# Patient Record
Sex: FEMALE | Race: WHITE | NOT HISPANIC OR LATINO | Employment: OTHER | URBAN - METROPOLITAN AREA
[De-identification: names, ages, dates, MRNs, and addresses within clinical notes are randomized per-mention and may not be internally consistent; named-entity substitution may affect disease eponyms.]

---

## 2017-10-19 ENCOUNTER — TELEPHONE (OUTPATIENT)
Dept: FAMILY MEDICINE CLINIC | Facility: CLINIC | Age: 67
End: 2017-10-19

## 2017-10-19 NOTE — TELEPHONE ENCOUNTER
Called pt to advise need to make an appointment to be seen, cannot fill controlled prescription at this time, received no answer and no machine to leave a message

## 2018-04-30 ENCOUNTER — OFFICE VISIT (OUTPATIENT)
Dept: FAMILY MEDICINE CLINIC | Facility: CLINIC | Age: 68
End: 2018-04-30

## 2018-04-30 VITALS
OXYGEN SATURATION: 99 % | DIASTOLIC BLOOD PRESSURE: 72 MMHG | SYSTOLIC BLOOD PRESSURE: 126 MMHG | HEIGHT: 63 IN | HEART RATE: 72 BPM | BODY MASS INDEX: 29.95 KG/M2 | WEIGHT: 169 LBS

## 2018-04-30 DIAGNOSIS — R35.0 URINARY FREQUENCY: ICD-10-CM

## 2018-04-30 DIAGNOSIS — K21.9 GASTROESOPHAGEAL REFLUX DISEASE, ESOPHAGITIS PRESENCE NOT SPECIFIED: ICD-10-CM

## 2018-04-30 DIAGNOSIS — Z12.11 SCREEN FOR COLON CANCER: ICD-10-CM

## 2018-04-30 DIAGNOSIS — Z00.00 MEDICARE ANNUAL WELLNESS VISIT, SUBSEQUENT: Primary | ICD-10-CM

## 2018-04-30 PROBLEM — I83.90 VARICOSE VEIN OF LEG: Status: ACTIVE | Noted: 2018-04-30

## 2018-04-30 PROBLEM — B39.9 OCULAR HISTOPLASMOSIS: Status: ACTIVE | Noted: 2018-04-30

## 2018-04-30 PROBLEM — H32 OCULAR HISTOPLASMOSIS: Status: ACTIVE | Noted: 2018-04-30

## 2018-04-30 PROCEDURE — 96160 PT-FOCUSED HLTH RISK ASSMT: CPT | Performed by: INTERNAL MEDICINE

## 2018-04-30 PROCEDURE — G0439 PPPS, SUBSEQ VISIT: HCPCS | Performed by: INTERNAL MEDICINE

## 2018-04-30 RX ORDER — SOLIFENACIN SUCCINATE 10 MG/1
10 TABLET, FILM COATED ORAL DAILY
Qty: 30 TABLET | Refills: 0 | Status: SHIPPED | OUTPATIENT
Start: 2018-04-30 | End: 2018-05-01 | Stop reason: CLARIF

## 2018-04-30 NOTE — PATIENT INSTRUCTIONS
Calcium - 1200-`1500mg /day - that is 4 servings.  May need to supplement some - 600mg/day  Vit d - 1000IU/day

## 2018-04-30 NOTE — PROGRESS NOTES
QUICK REFERENCE INFORMATION:  The ABCs of the Annual Wellness Visit    Subsequent Medicare Wellness Visit    HEALTH RISK ASSESSMENT    1950    Recent Hospitalizations:  No hospitalization(s) within the last year..        Current Medical Providers:  Patient Care Team:  Ariella Cates DO as PCP - General (Internal Medicine)        Smoking Status:  History   Smoking Status   • Never Smoker   Smokeless Tobacco   • Never Used       Alcohol Consumption:  History   Alcohol Use No       Depression Screen:   PHQ-2/PHQ-9 Depression Screening 4/30/2018   Little interest or pleasure in doing things 0   Feeling down, depressed, or hopeless 0   Total Score 0       Health Habits and Functional and Cognitive Screening:  Functional & Cognitive Status 4/30/2018   Do you have difficulty preparing food and eating? No   Do you have difficulty bathing yourself, getting dressed or grooming yourself? No   Do you have difficulty using the toilet? No   Do you have difficulty moving around from place to place? No   Do you have trouble with steps or getting out of a bed or a chair? No   In the past year have you fallen or experienced a near fall? No   Current Diet Well Balanced Diet   Dental Exam Up to date   Eye Exam Up to date   Exercise (times per week) 0 times per week   Current Exercise Activities Include None   Do you need help using the phone?  No   Are you deaf or do you have serious difficulty hearing?  No   Do you need help with transportation? No   Do you need help shopping? No   Do you need help preparing meals?  No   Do you need help with housework?  No   Do you need help with laundry? No   Do you need help taking your medications? No   Do you need help managing money? No   Do you ever drive or ride in a car without wearing a seat belt? No   Have you felt unusual stress, anger or loneliness in the last month? No   Who do you live with? Spouse   If you need help, do you have trouble finding someone available to you? No   Have  you been bothered in the last four weeks by sexual problems? No   Do you have difficulty concentrating, remembering or making decisions? No           Does the patient have evidence of cognitive impairment? No    Aspirin use counseling: Does not need ASA (and currently is not on it)      Recent Lab Results:  CMP:  Lab Results   Component Value Date    BUN 12 03/10/2015    CREATININE 0.4 (L) 03/10/2015     03/10/2015    K 4.2 03/10/2015    CO2 26 03/10/2015    CALCIUM 8.2 (L) 03/10/2015    ALBUMIN 4.1 03/08/2015    BILITOT 0.5 03/08/2015    ALKPHOS 69 03/08/2015    AST 19 03/08/2015    ALT 17 03/08/2015     Lipid Panel:     HbA1c:       Visual Acuity:  No exam data present    Age-appropriate Screening Schedule:  Refer to the list below for future screening recommendations based on patient's age, sex and/or medical conditions. Orders for these recommended tests are listed in the plan section. The patient has been provided with a written plan.    Health Maintenance   Topic Date Due   • TDAP/TD VACCINES (1 - Tdap) 05/08/1968   • PNEUMOCOCCAL VACCINES (65+ LOW/MEDIUM RISK) (1 of 2 - PCV13) 05/08/2014   • COLONOSCOPY  10/19/2017   • ZOSTER VACCINE  10/19/2017   • INFLUENZA VACCINE  08/01/2018   • MAMMOGRAM  02/22/2020          Health Maintenance Summary       Status Date      ANNUAL PHYSICAL Overdue 5/8/1953     TDAP/TD VACCINES Overdue 5/8/1968     PNEUMOCOCCAL VACCINES (65+ LOW/MEDIUM RISK) Overdue 5/8/2014     HEPATITIS C SCREENING Overdue 10/19/2017     COLONOSCOPY Overdue 10/19/2017     ZOSTER VACCINE Overdue 10/19/2017     INFLUENZA VACCINE Next Due 8/1/2018     MAMMOGRAM Next Due 2/22/2020      Done 2/22/2018 SCANNED - MAMMO          Subjective   History of Present Illness    Lanette Simpson is a 67 y.o. female who presents for an Subsequent Wellness Visit.    The following portions of the patient's history were reviewed and updated as appropriate: allergies, current medications, past family history, past  "medical history, past social history, past surgical history and problem list.    No outpatient prescriptions prior to visit.     No facility-administered medications prior to visit.        Patient Active Problem List   Diagnosis   • Ocular histoplasmosis   • GERD (gastroesophageal reflux disease)   • Varicose vein of leg   • Urinary frequency       Advance Care Planning:  has an advance directive - a copy HAS NOT been provided. Have asked the patient to send this to us to add to record.    Identification of Risk Factors:  Risk factors include: weight  and cardiovascular risk.    Review of Systems    Compared to one year ago, the patient feels her physical health is the same.  Compared to one year ago, the patient feels her mental health is the same.    Objective     Physical Exam    Vitals:    04/30/18 1501   BP: 126/72   Pulse: 72   SpO2: 99%   Weight: 76.7 kg (169 lb)   Height: 160 cm (63\")   PainSc: 0-No pain       Patient's Body mass index is 29.94 kg/m². BMI is above normal parameters. Follow-up plan includes:  exercise counseling and nutrition counseling.    Gen: well appearing in nad, no resp effort  Eyes: conjunctiva clear, perrl, eomi  ENT: mmm, no thyromegaly, no lymphadenopathy  CV: s1, s2 reg no m/r/g, no bruits, no jvd  No peripheral edema, pedal pulses intact  Breast: no skin changes, nipple inversion, axillary adenopathy, or breast mass bilaterally  Resp:  clear b/l no w/r/r  GI:  soft nt/nd  / rectal deferred - pt request  Skin: no clubbing or cyanosis  Neuro: no focal deficits.      Assessment/Plan   Patient Self-Management and Personalized Health Advice  The patient has been provided with information about: diet, exercise and prevention of cardiac or vascular disease and preventive services including:   · Colorectal cancer screening, fecal occult blood test, Diabetes screening, see lab orders, Exercise counseling provided, Fall Risk assessment done, TdaP vaccine.    Visit Diagnoses:    ICD-10-CM " ICD-9-CM   1. Medicare annual wellness visit, subsequent Z00.00 V70.0   2. Urinary frequency R35.0 788.41   3. Gastroesophageal reflux disease, esophagitis presence not specified K21.9 530.81       Trial of vesicare x 4wk to see if helpful with urinary issues, discussed urology consultation, pt declines    tdap script given  Lab orders given, pt requests quest    Orders Placed This Encounter   Procedures   • Lipid panel     Standing Status:   Future     Standing Expiration Date:   4/30/2019   • Comprehensive metabolic panel     Standing Status:   Future     Standing Expiration Date:   4/30/2019       Outpatient Encounter Prescriptions as of 4/30/2018   Medication Sig Dispense Refill   • solifenacin (VESICARE) 10 MG tablet Take 1 tablet by mouth Daily. 30 tablet 0   • Tdap (BOOSTRIX) 5-2.5-18.5 LF-MCG/0.5 injection Inject 0.5 mL into the shoulder, thigh, or buttocks 1 (One) Time for 1 dose. 0.5 mL 0     No facility-administered encounter medications on file as of 4/30/2018.        Reviewed use of high risk medication in the elderly: not applicable  Reviewed for potential of harmful drug interactions in the elderly: not applicable    Follow Up:  Return in about 1 year (around 4/30/2019) for Medicare Wellness.     An After Visit Summary and PPPS with all of these plans were given to the patient.

## 2018-05-01 ENCOUNTER — TELEPHONE (OUTPATIENT)
Dept: FAMILY MEDICINE CLINIC | Facility: CLINIC | Age: 68
End: 2018-05-01

## 2018-05-01 RX ORDER — OXYBUTYNIN CHLORIDE 10 MG/1
10 TABLET, EXTENDED RELEASE ORAL DAILY
Qty: 30 TABLET | Refills: 2 | Status: SHIPPED | OUTPATIENT
Start: 2018-05-01 | End: 2018-05-17 | Stop reason: SDUPTHER

## 2018-05-01 NOTE — TELEPHONE ENCOUNTER
Received call from pt, advised her of medicine being changed to Oxybutynin 10 mg 1 po qd, pt voiced understanding and appreciation

## 2018-05-01 NOTE — TELEPHONE ENCOUNTER
PT SAYS THE VESICARE IS A TEIR 4 ITS TO EXPENSIVE.PLEASE CHANGE. CALL TO MECHELLE PORTER Dr. PLEASE CALL PT IF WE CAN'T CHECK WHAT TEIR THE MED IS BEFORE WE ORDER CAUSE SHE CAN.

## 2018-05-05 ENCOUNTER — RESULTS ENCOUNTER (OUTPATIENT)
Dept: FAMILY MEDICINE CLINIC | Facility: CLINIC | Age: 68
End: 2018-05-05

## 2018-05-05 DIAGNOSIS — Z00.00 MEDICARE ANNUAL WELLNESS VISIT, SUBSEQUENT: ICD-10-CM

## 2018-05-15 ENCOUNTER — TELEPHONE (OUTPATIENT)
Dept: FAMILY MEDICINE CLINIC | Facility: CLINIC | Age: 68
End: 2018-05-15

## 2018-05-15 NOTE — TELEPHONE ENCOUNTER
CHECKING ON THE STATUS OF HER LABS THAT WERE TAKEN ON 5/2/18.     PLEASE [facility] [phone]    DR COLLAZO PUT HER ON A NEW MED AND PATIENT THINKS SHE IS DOING VERY WELL ON It. CAN WE CALL HER IN MORE.    OXYBUTYNIN EL 10 Mg, TAKES 1 TAB 1 X DAILY, 90 DAY SUPPLY WITH REFILLS    [facility] MAIL DELIVERY

## 2018-05-16 NOTE — TELEPHONE ENCOUNTER
Lab results are not in chart.     Spoke to Asia at [facility]. They have no record of labs being sent to them.

## 2018-05-17 RX ORDER — OXYBUTYNIN CHLORIDE 10 MG/1
10 TABLET, EXTENDED RELEASE ORAL DAILY
Qty: 90 TABLET | Refills: 1 | Status: SHIPPED | OUTPATIENT
Start: 2018-05-17 | End: 2018-11-21 | Stop reason: SDUPTHER

## 2018-05-17 NOTE — TELEPHONE ENCOUNTER
Spoke to Adam at [facility] [place].     It does not appear that Lanette had labs done at a [facility] [place] draw station.     LM at #[phone] for pt to call back.      Verify where she had labs drawn.

## 2018-05-25 ENCOUNTER — TELEPHONE (OUTPATIENT)
Dept: FAMILY MEDICINE CLINIC | Facility: CLINIC | Age: 68
End: 2018-05-25

## 2018-05-25 NOTE — TELEPHONE ENCOUNTER
[phone]  PT       HAD LABS AT [facility] [address] AND [address] [place] [address]      PLEASE LVM AND /OR MAIL COPY    HAVE BEEN WAITING FOR RESULTS

## 2018-05-31 NOTE — TELEPHONE ENCOUNTER
Patient called back and I notified of her labs results. Understanding and appreciation was verbalized.

## 2018-10-15 ENCOUNTER — OFFICE VISIT (OUTPATIENT)
Dept: FAMILY MEDICINE CLINIC | Facility: CLINIC | Age: 68
End: 2018-10-15

## 2018-10-15 VITALS
BODY MASS INDEX: 28.53 KG/M2 | SYSTOLIC BLOOD PRESSURE: 124 MMHG | DIASTOLIC BLOOD PRESSURE: 64 MMHG | WEIGHT: 161 LBS | HEIGHT: 63 IN | OXYGEN SATURATION: 98 % | HEART RATE: 91 BPM

## 2018-10-15 DIAGNOSIS — M22.2X1 PATELLOFEMORAL PAIN SYNDROME OF RIGHT KNEE: Primary | ICD-10-CM

## 2018-10-15 PROCEDURE — 99213 OFFICE O/P EST LOW 20 MIN: CPT | Performed by: INTERNAL MEDICINE

## 2018-10-15 RX ORDER — MELOXICAM 7.5 MG/1
7.5 TABLET ORAL DAILY
Qty: 30 TABLET | Refills: 0 | Status: SHIPPED | OUTPATIENT
Start: 2018-10-15 | End: 2019-01-24 | Stop reason: SDUPTHER

## 2018-10-15 NOTE — PROGRESS NOTES
"Subjective   Lanette Simpson is a 68 y.o. female who presents with knee pain involving the right knee. Onset was gradual, starting about 7 days ago. Inciting event: none known. Current symptoms include: crepitus sensation and pain located diffusely anterior knee. Pain is aggravated by going up and down stairs, kneeling and squatting. Patient has had prior knee problems - similar occurrence 6mo prior improved with icing, wore a knee sleeve for support. Evaluation to date: none. Treatment to date: none.    Admits to working at  and squatting, on knees often.  No h/o trauma to knee.    The following portions of the patient's history were reviewed and updated as appropriate: allergies, current medications, past family history, past medical history, past social history, past surgical history and problem list.     Review of Systems  Pertinent items are noted in HPI.   No night pain, fever chills, sweats, wt loss.  Has had right wrist pain last week for 2d - felt mild pain and weakness, no redness, swelling - no resolved on own    Objective   /64   Pulse 91   Ht 160 cm (63\")   Wt 73 kg (161 lb)   SpO2 98%   BMI 28.52 kg/m²   Right knee: no effusion, full active range of motion, no joint line tenderness, ligamentous structures intact.   +creptius, slight warmth c/w left   Left knee:  normal and no effusion, full active range of motion, no joint line tenderness, ligamentous structures intact.           Right wrist - no swelling, redness, warmth , or tenderness.  Full rom     Assessment/Plan Moderate patellofemoral syndrome on the right     Natural history and expected course discussed. Questions answered.  Educational materials distributed.  Rest, ice, compression, and elevation (RICE) therapy.  Quad strengthening exercises.  NSAIDs per medication orders.     F/u if not improving, discussed xray and PT if not improving           "

## 2018-10-16 ENCOUNTER — TELEPHONE (OUTPATIENT)
Dept: FAMILY MEDICINE CLINIC | Facility: CLINIC | Age: 68
End: 2018-10-16

## 2018-10-16 NOTE — TELEPHONE ENCOUNTER
PT WANTS TO KNOW IF SHE CAN TAKE IBUPROFEN WITH THE MELOXICAM THAT WAS GIVEN YESTERDAY. PLEASE CALL PT TO LET HER KNOW.

## 2018-10-16 NOTE — TELEPHONE ENCOUNTER
Called patient, advised that she should not take both medications.  Advised to take one or the other.  Patients  states he understands.

## 2018-10-19 ENCOUNTER — TELEPHONE (OUTPATIENT)
Dept: FAMILY MEDICINE CLINIC | Facility: CLINIC | Age: 68
End: 2018-10-19

## 2018-10-19 NOTE — TELEPHONE ENCOUNTER
PER PT WAS PUT ON MELOXICAM BY , WENT TO DENTIST ON TUESDAY AND HAD INFECTION DENTIST PRESCRIBED MEDROL DOSE PACK FOR INFECTION. PT WOULD LIKE TO KNOW IF THESE TWO CAN BE TAKE TOGETHER. PLEASE CALL PT BACK TO [facility], IF PT DOES NOT , PLEASE LEAVE VOICE MESSAGE.

## 2018-10-19 NOTE — TELEPHONE ENCOUNTER
Spoke with patient and advised to hold meloxicam while on medrol dose marcia. Pt verbalized understanding.

## 2018-10-19 NOTE — TELEPHONE ENCOUNTER
Would advise she hold the meloxicam until the medrol dose pack finished.  The 2 together are a big risk of GI upset/ irriation/ bleed.  Take either med with food.      Ariella

## 2018-11-21 RX ORDER — OXYBUTYNIN CHLORIDE 10 MG/1
TABLET, EXTENDED RELEASE ORAL
Qty: 90 TABLET | Refills: 1 | Status: SHIPPED | OUTPATIENT
Start: 2018-11-21 | End: 2019-08-17 | Stop reason: SDUPTHER

## 2019-01-14 ENCOUNTER — OFFICE VISIT (OUTPATIENT)
Dept: FAMILY MEDICINE CLINIC | Facility: CLINIC | Age: 69
End: 2019-01-14

## 2019-01-14 VITALS
TEMPERATURE: 98 F | OXYGEN SATURATION: 99 % | WEIGHT: 162 LBS | DIASTOLIC BLOOD PRESSURE: 88 MMHG | HEART RATE: 94 BPM | SYSTOLIC BLOOD PRESSURE: 136 MMHG | BODY MASS INDEX: 28.7 KG/M2

## 2019-01-14 DIAGNOSIS — J35.8 TONSIL STONE: ICD-10-CM

## 2019-01-14 DIAGNOSIS — J02.9 SORE THROAT: Primary | ICD-10-CM

## 2019-01-14 LAB
EXPIRATION DATE: NORMAL
INTERNAL CONTROL: NORMAL
Lab: NORMAL
S PYO AG THROAT QL: NEGATIVE

## 2019-01-14 PROCEDURE — 87880 STREP A ASSAY W/OPTIC: CPT | Performed by: NURSE PRACTITIONER

## 2019-01-14 PROCEDURE — 99213 OFFICE O/P EST LOW 20 MIN: CPT | Performed by: NURSE PRACTITIONER

## 2019-01-14 NOTE — PATIENT INSTRUCTIONS
Strep Throat  Strep throat is an infection of the throat. It is caused by germs. Strep throat spreads from person to person because of coughing, sneezing, or close contact.  Follow these instructions at home:  Medicines  · Take over-the-counter and prescription medicines only as told by your doctor.  · Take your antibiotic medicine as told by your doctor. Do not stop taking the medicine even if you feel better.  · Have family members who also have a sore throat or fever go to a doctor.  Eating and drinking  · Do not share food, drinking cups, or personal items.  · Try eating soft foods until your sore throat feels better.  · Drink enough fluid to keep your pee (urine) clear or pale yellow.  General instructions  · Rinse your mouth (gargle) with a salt-water mixture 3-4 times per day or as needed. To make a salt-water mixture, stir ½-1 tsp of salt into 1 cup of warm water.  · Make sure that all people in your house wash their hands well.  · Rest.  · Stay home from school or work until you have been taking antibiotics for 24 hours.  · Keep all follow-up visits as told by your doctor. This is important.  Contact a doctor if:  · Your neck keeps getting bigger.  · You get a rash, cough, or earache.  · You cough up thick liquid that is green, yellow-brown, or bloody.  · You have pain that does not get better with medicine.  · Your problems get worse instead of getting better.  · You have a fever.  Get help right away if:  · You throw up (vomit).  · You get a very bad headache.  · You neck hurts or it feels stiff.  · You have chest pain or you are short of breath.  · You have drooling, very bad throat pain, or changes in your voice.  · Your neck is swollen or the skin gets red and tender.  · Your mouth is dry or you are peeing less than normal.  · You keep feeling more tired or it is hard to wake up.  · Your joints are red or they hurt.  This information is not intended to replace advice given to you by your health care  provider. Make sure you discuss any questions you have with your health care provider.    Try salt water gargles a few times a day, Listerine morning and night  Document Released: 06/05/2009 Document Revised: 08/16/2017 Document Reviewed: 04/11/2016  [employer] Patient Education © 2018 [employer]

## 2019-01-14 NOTE — PROGRESS NOTES
Subjective   Lanette Simpson is a 68 y.o. female.   Chief Complaint   Patient presents with   • Sore Throat     X 1 week       History of Present Illness   Patient is here with complaint of sore throat,symptoms started one week ago.  had tooth extracted in Sept, has had pain off and on since then, is going to dentist again tomorrow. She was on an antibiotic prior to extraction in September and then after. Has had swelling under her tongue that is worse when she wakes up in the morning, has had right ear pain along with right side sore throat.   The following portions of the patient's history were reviewed and updated as appropriate: allergies, current medications, past family history, past medical history, past social history, past surgical history and problem list.    Review of Systems   Constitutional: Positive for chills and fever.   HENT: Positive for congestion (head), dental problem, ear pain (right), postnasal drip and sore throat (right side).    Respiratory: Negative for cough and shortness of breath.    Cardiovascular: Negative for chest pain.   Gastrointestinal: Positive for nausea (from drainage). Negative for diarrhea.   Musculoskeletal: Positive for arthralgias. Negative for myalgias.   Neurological: Positive for headaches. Negative for dizziness and light-headedness.       Objective   Physical Exam   Constitutional: She is oriented to person, place, and time. She appears well-developed and well-nourished. No distress.   HENT:   Head: Normocephalic and atraumatic.   Right Ear: External ear normal.   Left Ear: External ear normal.   Mouth/Throat: Mucous membranes are normal. Posterior oropharyngeal erythema (right tonsil stones) present. No oropharyngeal exudate, posterior oropharyngeal edema or tonsillar abscesses. Tonsils are 2+ on the right. Tonsils are 1+ on the left.   Eyes: Conjunctivae are normal. No scleral icterus.   Cardiovascular: Normal rate, regular rhythm and normal heart sounds.   No  murmur heard.  Pulmonary/Chest: Effort normal and breath sounds normal. No stridor. No respiratory distress.   Lymphadenopathy:     She has no cervical adenopathy.   Neurological: She is alert and oriented to person, place, and time.   Skin: Skin is warm and dry. She is not diaphoretic.   Psychiatric: She has a normal mood and affect. Her behavior is normal. Judgment and thought content normal.   Vitals reviewed.      Assessment/Plan   Lanette was seen today for sore throat.    Diagnoses and all orders for this visit:    Sore throat  -     POC Rapid Strep A    Tonsil stone        Results for orders placed or performed in visit on 01/14/19   POC Rapid Strep A   Result Value Ref Range    Rapid Strep A Screen Negative Negative, VALID, INVALID, Not Performed    Internal Control Passed Passed    Lot Number [id number]     Expiration Date 03/31/2019      Strep test is negative in office today, right tonsil is inflamed with tonsil stones, advised salt water gargles several times a day and Listerine morning and night. Offered to refer to ENT, patient declined at this time.  Follow up with dentist as scheduled.   Patient was encouraged to keep me informed of any acute changes, lack of improvement, or any new concerning symptoms.Patient voiced understanding of all instructions and denied further questions.

## 2019-01-24 ENCOUNTER — TELEPHONE (OUTPATIENT)
Dept: FAMILY MEDICINE CLINIC | Facility: CLINIC | Age: 69
End: 2019-01-24

## 2019-01-24 RX ORDER — MELOXICAM 7.5 MG/1
7.5 TABLET ORAL DAILY
Qty: 30 TABLET | Refills: 0 | Status: SHIPPED | OUTPATIENT
Start: 2019-01-24 | End: 2019-03-02 | Stop reason: SDUPTHER

## 2019-03-01 ENCOUNTER — TELEPHONE (OUTPATIENT)
Dept: FAMILY MEDICINE CLINIC | Facility: CLINIC | Age: 69
End: 2019-03-01

## 2019-03-01 NOTE — TELEPHONE ENCOUNTER
PT CALLED WANTED A PRINT OUT OF THE RESULTS FROM HER LAST LAB WORK. SHE SAID IT WAS FROM HER PHYS FROM ABOUT A YEAR AGO.  I AM SEEING LABS THAT ARE MORE CURRENT THAN THAT.    SHE WANTED TO BE ABLE TO PICK THEM UP TODAY BUT  I LET HER KNOW THAT SHE WOULD GET A CALL WHEN THEY WERE READY TO BE PICKED UP.

## 2019-03-04 RX ORDER — MELOXICAM 7.5 MG/1
TABLET ORAL
Qty: 30 TABLET | Refills: 0 | Status: SHIPPED | OUTPATIENT
Start: 2019-03-04 | End: 2019-04-03 | Stop reason: SDUPTHER

## 2019-03-25 ENCOUNTER — PROCEDURE VISIT (OUTPATIENT)
Dept: ONCOLOGY | Facility: CLINIC | Age: 69
End: 2019-03-25

## 2019-03-25 ENCOUNTER — HOSPITAL ENCOUNTER (OUTPATIENT)
Dept: ONCOLOGY | Facility: HOSPITAL | Age: 69
Discharge: HOME | End: 2019-03-25
Admitting: INTERNAL MEDICINE

## 2019-03-25 ENCOUNTER — CONSULT (OUTPATIENT)
Dept: ONCOLOGY | Facility: CLINIC | Age: 69
End: 2019-03-25

## 2019-03-25 VITALS
TEMPERATURE: 97.8 F | WEIGHT: 160 LBS | HEART RATE: 84 BPM | SYSTOLIC BLOOD PRESSURE: 170 MMHG | DIASTOLIC BLOOD PRESSURE: 74 MMHG | BODY MASS INDEX: 28.35 KG/M2 | HEIGHT: 63 IN | RESPIRATION RATE: 19 BRPM

## 2019-03-25 DIAGNOSIS — C83.30 DIFFUSE LARGE B-CELL LYMPHOMA, UNSPECIFIED BODY REGION (HCC): Primary | ICD-10-CM

## 2019-03-25 DIAGNOSIS — Z51.11 ENCOUNTER FOR ANTINEOPLASTIC CHEMOTHERAPY: ICD-10-CM

## 2019-03-25 DIAGNOSIS — C83.31 DIFFUSE LARGE B-CELL LYMPHOMA OF LYMPH NODES OF HEAD (HCC): ICD-10-CM

## 2019-03-25 DIAGNOSIS — C83.31 DIFFUSE LARGE B-CELL LYMPHOMA OF LYMPH NODES OF HEAD (HCC): Primary | ICD-10-CM

## 2019-03-25 LAB
ALBUMIN SERPL-MCNC: 4.63 G/DL (ref 3.2–4.8)
ALBUMIN/GLOB SERPL: 1.6 G/DL (ref 1.5–2.5)
ALP SERPL-CCNC: 80 U/L (ref 25–100)
ALT SERPL W P-5'-P-CCNC: 27 U/L (ref 7–40)
ANION GAP SERPL CALCULATED.3IONS-SCNC: 12 MMOL/L (ref 3–11)
AST SERPL-CCNC: 29 U/L (ref 0–33)
BASOPHILS # BLD AUTO: 0.16 10*3/MM3 (ref 0–0.2)
BASOPHILS NFR BLD AUTO: 1.7 % (ref 0–1)
BILIRUB SERPL-MCNC: 0.6 MG/DL (ref 0.3–1.2)
BUN BLD-MCNC: 21 MG/DL (ref 9–23)
BUN/CREAT SERPL: 28.4 (ref 7–25)
CALCIUM SPEC-SCNC: 9.8 MG/DL (ref 8.7–10.4)
CHLORIDE SERPL-SCNC: 104 MMOL/L (ref 99–109)
CO2 SERPL-SCNC: 24 MMOL/L (ref 20–31)
CREAT BLD-MCNC: 0.74 MG/DL (ref 0.6–1.3)
DEPRECATED RDW RBC AUTO: 51.1 FL (ref 37–54)
EOSINOPHIL # BLD AUTO: 0.25 10*3/MM3 (ref 0–0.3)
EOSINOPHIL NFR BLD AUTO: 2.7 % (ref 0–3)
ERYTHROCYTE [DISTWIDTH] IN BLOOD BY AUTOMATED COUNT: 15.1 % (ref 11.3–14.5)
GFR SERPL CREATININE-BSD FRML MDRD: 78 ML/MIN/1.73
GLOBULIN UR ELPH-MCNC: 2.9 GM/DL
GLUCOSE BLD-MCNC: 80 MG/DL (ref 70–100)
HBV SURFACE AB SER RIA-ACNC: REACTIVE
HBV SURFACE AG SERPL QL IA: NORMAL
HCT VFR BLD AUTO: 47.9 % (ref 34.5–44)
HGB BLD-MCNC: 15.8 G/DL (ref 11.5–15.5)
IMM GRANULOCYTES # BLD AUTO: 0.02 10*3/MM3 (ref 0–0.05)
IMM GRANULOCYTES NFR BLD AUTO: 0.2 % (ref 0–0.6)
LDH SERPL-CCNC: 287 U/L (ref 120–246)
LYMPHOCYTES # BLD AUTO: 2.93 10*3/MM3 (ref 0.6–4.8)
LYMPHOCYTES NFR BLD AUTO: 31.4 % (ref 24–44)
MCH RBC QN AUTO: 30.6 PG (ref 27–31)
MCHC RBC AUTO-ENTMCNC: 33 G/DL (ref 32–36)
MCV RBC AUTO: 92.8 FL (ref 80–99)
MONOCYTES # BLD AUTO: 0.59 10*3/MM3 (ref 0–1)
MONOCYTES NFR BLD AUTO: 6.3 % (ref 0–12)
NEUTROPHILS # BLD AUTO: 5.4 10*3/MM3 (ref 1.5–8.3)
NEUTROPHILS NFR BLD AUTO: 57.9 % (ref 41–71)
PLATELET # BLD AUTO: 635 10*3/MM3 (ref 150–450)
PMV BLD AUTO: 10.3 FL (ref 6–12)
POTASSIUM BLD-SCNC: 4.9 MMOL/L (ref 3.5–5.5)
PROT SERPL-MCNC: 7.5 G/DL (ref 5.7–8.2)
RBC # BLD AUTO: 5.16 10*6/MM3 (ref 3.89–5.14)
SODIUM BLD-SCNC: 140 MMOL/L (ref 132–146)
WBC NRBC COR # BLD: 9.33 10*3/MM3 (ref 3.5–10.8)

## 2019-03-25 PROCEDURE — 38221 DX BONE MARROW BIOPSIES: CPT

## 2019-03-25 PROCEDURE — 85097 BONE MARROW INTERPRETATION: CPT | Performed by: INTERNAL MEDICINE

## 2019-03-25 PROCEDURE — 25010000002 DIPHENHYDRAMINE PER 50 MG: Performed by: INTERNAL MEDICINE

## 2019-03-25 PROCEDURE — 99205 OFFICE O/P NEW HI 60 MIN: CPT | Performed by: INTERNAL MEDICINE

## 2019-03-25 PROCEDURE — 80053 COMPREHEN METABOLIC PANEL: CPT | Performed by: INTERNAL MEDICINE

## 2019-03-25 PROCEDURE — 88311 DECALCIFY TISSUE: CPT | Performed by: INTERNAL MEDICINE

## 2019-03-25 PROCEDURE — 87340 HEPATITIS B SURFACE AG IA: CPT | Performed by: INTERNAL MEDICINE

## 2019-03-25 PROCEDURE — 85025 COMPLETE CBC W/AUTO DIFF WBC: CPT | Performed by: INTERNAL MEDICINE

## 2019-03-25 PROCEDURE — 88341 IMHCHEM/IMCYTCHM EA ADD ANTB: CPT | Performed by: INTERNAL MEDICINE

## 2019-03-25 PROCEDURE — 86704 HEP B CORE ANTIBODY TOTAL: CPT | Performed by: INTERNAL MEDICINE

## 2019-03-25 PROCEDURE — 88313 SPECIAL STAINS GROUP 2: CPT | Performed by: INTERNAL MEDICINE

## 2019-03-25 PROCEDURE — 86706 HEP B SURFACE ANTIBODY: CPT | Performed by: INTERNAL MEDICINE

## 2019-03-25 PROCEDURE — 96375 TX/PRO/DX INJ NEW DRUG ADDON: CPT

## 2019-03-25 PROCEDURE — 83615 LACTATE (LD) (LDH) ENZYME: CPT | Performed by: INTERNAL MEDICINE

## 2019-03-25 PROCEDURE — 38222 DX BONE MARROW BX & ASPIR: CPT | Performed by: INTERNAL MEDICINE

## 2019-03-25 PROCEDURE — 88342 IMHCHEM/IMCYTCHM 1ST ANTB: CPT | Performed by: INTERNAL MEDICINE

## 2019-03-25 PROCEDURE — 88305 TISSUE EXAM BY PATHOLOGIST: CPT | Performed by: INTERNAL MEDICINE

## 2019-03-25 PROCEDURE — 25010000002 LORAZEPAM PER 2 MG: Performed by: INTERNAL MEDICINE

## 2019-03-25 RX ORDER — LORAZEPAM 2 MG/ML
1 INJECTION INTRAMUSCULAR
Status: CANCELLED | OUTPATIENT
Start: 2019-03-25

## 2019-03-25 RX ORDER — DIPHENHYDRAMINE HYDROCHLORIDE 50 MG/ML
25 INJECTION INTRAMUSCULAR; INTRAVENOUS EVERY 4 HOURS PRN
Status: DISCONTINUED | OUTPATIENT
Start: 2019-03-25 | End: 2019-03-26 | Stop reason: HOSPADM

## 2019-03-25 RX ORDER — LORAZEPAM 2 MG/ML
1 INJECTION INTRAMUSCULAR
Status: DISCONTINUED | OUTPATIENT
Start: 2019-03-25 | End: 2019-03-26 | Stop reason: HOSPADM

## 2019-03-25 RX ORDER — LIDOCAINE AND PRILOCAINE 25; 25 MG/G; MG/G
CREAM TOPICAL AS NEEDED
Qty: 30 G | Refills: 3 | Status: SHIPPED | OUTPATIENT
Start: 2019-03-25 | End: 2019-11-08

## 2019-03-25 RX ORDER — DIPHENHYDRAMINE HYDROCHLORIDE 50 MG/ML
25 INJECTION INTRAMUSCULAR; INTRAVENOUS EVERY 4 HOURS PRN
Status: CANCELLED | OUTPATIENT
Start: 2019-03-25

## 2019-03-25 RX ADMIN — DIPHENHYDRAMINE HYDROCHLORIDE 25 MG: 50 INJECTION INTRAMUSCULAR; INTRAVENOUS at 12:57

## 2019-03-25 RX ADMIN — LORAZEPAM 1 MG: 2 INJECTION INTRAMUSCULAR; INTRAVENOUS at 12:58

## 2019-03-25 NOTE — PROGRESS NOTES
============    BONE MARROW PROCEDURE      =========        INDICATIONS: Staging lymphoma    PROCEDURE:  A time out was performed Premedications was given with 1 mg of ativan IV and Phenergan 25 mg IV.  After informed consent was obtained, the skin overlying the right  posterior superior iliac spine was prepped and draped in sterile fashion.     1% lidocaine was infiltrated into the skin, subcutaneous tissue and periosteum overlying the right posterior iliac spine.   A  Jamshidi needle was introduced into the marrow cavity and suction was applied with a 20 cc syringe.  Aspirate material was obtained.  The needle was then reinserted into the marrow space and a core biopsy was obtained. The patient tolerated the procedure well.  No immediate complications.    The patient was observed in the procedure room for 30 minutes following the procedure and then discharged to home.        Joan Zuluaga MD   3/25/2019

## 2019-03-25 NOTE — PROGRESS NOTES
DATE OF CONSULTATION: 3/25/2019    REFERRING PHYSICIAN: Ariella Cates DO    Dear Ariella Eastman DO  Thank you for asking for my medical advice on this patient. I saw her in the  [place] office on 3/25/2019    REASON FOR CONSULTATION: Diffuse large B-cell lymphoma    HISTORY OF PRESENT ILLNESS: The patient is a very pleasant 68 y.o.  female   who was in her usual state of health until August 2018.  Patient had right lower tooth extraction.  Since then she has been having some complaints in her mouth.  She has been having pain, numbness in her lower lip.  Difficulty swallowing.  Never had this problem before.  She went and saw the dentist who did an x-ray that revealed some abnormalities.  She was referred to oral surgeon at .  A CT jaw revealed multiple contrast-enhancing lesions lower gum.  Biopsy done on March 7, 2019 revealed diffuse large B-cell lymphoma.  The patient was referred to me for further recommendations.    SUBJECTIVE: When I saw the patient today she is here with her .  She is anxious about her new cancer diagnosis.  She is complaining of weight loss, she lost 15 pounds over the last year.  Denies any night sweats.  Never been diagnosed with cancer before she is healthy active continue to work part-time.    Review of Systems   Constitutional: Negative for activity change, appetite change, chills, fatigue, fever and unexpected weight change.   HENT: Negative for hearing loss, mouth sores, nosebleeds, sore throat and trouble swallowing.    Eyes: Negative for visual disturbance.   Respiratory: Negative for cough, chest tightness, shortness of breath and wheezing.    Cardiovascular: Negative for chest pain, palpitations and leg swelling.   Gastrointestinal: Negative for abdominal distention, abdominal pain, blood in stool, constipation, diarrhea, nausea, rectal pain and vomiting.   Endocrine: Negative for cold intolerance and heat intolerance.   Genitourinary: Negative for difficulty  urinating, dysuria, frequency and urgency.   Musculoskeletal: Negative for arthralgias, back pain, gait problem, joint swelling and myalgias.   Skin: Negative for rash.   Neurological: Negative for dizziness, tremors, syncope, weakness, light-headedness, numbness and headaches.   Hematological: Negative for adenopathy. Does not bruise/bleed easily.   Psychiatric/Behavioral: Negative for confusion, sleep disturbance and suicidal ideas. The patient is not nervous/anxious.        History reviewed. No pertinent past medical history.    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    3 kids.    10 gk.    Working PT    Exercise: active, no formal    Dental: due, does not like the dentist    Eye: utd, glasses       Family History   Problem Relation Age of Onset   • Pancreatic cancer Mother    • Hypertension Mother    • Diabetes Father    • Prostate cancer Father    • Hypertension Sister    • Diabetes Brother    • Hypertension Brother    • Breast cancer Maternal Grandmother    • Stroke Maternal Grandfather    • Breast cancer Paternal Grandmother    • Glaucoma Paternal Grandfather    • Diabetes Brother    • Hypertension Brother        Past Surgical History:   Procedure Laterality Date   • TOTAL ABDOMINAL HYSTERECTOMY      bladder lift       No Known Allergies       Current Outpatient Medications:   •  CALCIUM PO, Take  by mouth., Disp: , Rfl:   •  meloxicam (MOBIC) 7.5 MG tablet, TAKE ONE TABLET BY MOUTH DAILY, Disp: 30 tablet, Rfl: 0  •  Multiple Vitamins-Minerals  "(MULTIVITAMIN ADULT PO), Take  by mouth., Disp: , Rfl:   •  oxybutynin XL (DITROPAN-XL) 10 MG 24 hr tablet, TAKE 1 TABLET EVERY DAY, Disp: 90 tablet, Rfl: 1    PHYSICAL EXAMINATION:   /74   Pulse 84   Temp 97.8 °F (36.6 °C) (Temporal)   Resp 19   Ht 160 cm (63\")   Wt 72.6 kg (160 lb)   BMI 28.34 kg/m²    ECOG Performance Status: 1 - Symptomatic but completely ambulatory  General Appearance:  alert, cooperative, no apparent distress and appears stated age   Neurologic/Psychiatric: A&O x 3, gait steady, appropriate affect, strength 5/5 in all muscle groups   HEENT:  Normocephalic, without obvious abnormality, mucous membranes moist   Neck: Supple, symmetrical, trachea midline, no adenopathy;  No thyromegaly, masses, or tenderness   Lungs:   Clear to auscultation bilaterally; respirations regular, even, and unlabored bilaterally   Heart:  Regular rate and rhythm, no murmurs appreciated   Abdomen:   Soft, non-tender, non-distended and no organomegaly   Lymph nodes: No cervical, supraclavicular, inguinal or axillary adenopathy noted   Extremities: Normal, atraumatic; no clubbing, cyanosis, or edema    Skin: No rashes, ulcers, or suspicious lesions noted       No visits with results within 2 Week(s) from this visit.   Latest known visit with results is:   Office Visit on 01/14/2019   Component Date Value Ref Range Status   • Rapid Strep A Screen 01/14/2019 Negative  Negative, VALID, INVALID, Not Performed Final   • Internal Control 01/14/2019 Passed  Passed Final   • Lot Number 01/14/2019 [id number]   Final   • Expiration Date 01/14/2019 03/31/2019   Final        No results found.      DIAGNOSTIC DATA:   1. Radiology: CAT scan facial bones done on February 26, 2019 revealed 2.3 cm lucency in the anterior mandible bone.  2. Dr. colindres's note reviewed by me and documented in the  chart.   3. Pathology report: March 7, 2019 mandible bone biopsy revealed diffuse large B-cell lymphoma  4. Laboratory data: Reviewed by " me document patient's chart    ASSESSMENT: The patient is a very pleasant 68 y.o.  female  with mandible diffuse large B-cell lymphoma    PROBLEM LIST:   1.  Mandible diffuse large B-cell lymphoma:  A.  Presented with gum pain and swelling  B.  CT facial bones done on February 26, 2019 revealed 2.3 cm lucency in the anterior mandible  C.  Status post biopsy done on March 7, 2019 consistent with diffuse large B-cell lymphoma  2.  Osteoarthritis    PLAN:   1. I had a long discussion today with the patient and her  about her  new diagnosis of lymphoma. I did go over the final pathology report in  detail with both of them. I reviewed the patient's documents including refereing provider's notes, lab results, imaging, and path report.   2.  I will do staging workup with whole body PET scan as well as bone marrow biopsy.  3.  I would arrange for Port-A-Cath placement as well as cardiac echo for chemotherapy preparation.  4.  I explained the patient that should be treated with systemic chemotherapy.  Number of cycles will depend on her stage.  Also she might benefit from involved field radiation.  5.  The patient will be treated with R CHOP chemotherapy.  I will get her set up to see my nurse practitioner for chemotherapy education session.  6.  We discussed the potential risks and side effects of R CHOP chemotherapy including neutropenia, alopecia, nausea and vomiting, fatigue, neuropathy, cardiomyopathy, constipation, infusion reaction, and a small risk of myelodysplasia.  7.  The patient will follow up with me in 1 week to go over the results as well as need to start on treatment.  8.  I will monitor the patient blood work including blood counts and facial function and electrolytes.  9.  I will check the patient's serum LDH level today for prognosis purposes.  Joan Zuluaga MD  3/25/2019

## 2019-03-26 LAB — HBV CORE AB SER DONR QL IA: NEGATIVE

## 2019-03-27 ENCOUNTER — APPOINTMENT (OUTPATIENT)
Dept: PREADMISSION TESTING | Facility: HOSPITAL | Age: 69
End: 2019-03-27

## 2019-03-27 ENCOUNTER — OFFICE VISIT (OUTPATIENT)
Dept: ONCOLOGY | Facility: CLINIC | Age: 69
End: 2019-03-27

## 2019-03-27 VITALS
DIASTOLIC BLOOD PRESSURE: 69 MMHG | TEMPERATURE: 97.9 F | RESPIRATION RATE: 12 BRPM | SYSTOLIC BLOOD PRESSURE: 130 MMHG | BODY MASS INDEX: 28.7 KG/M2 | HEIGHT: 63 IN | HEART RATE: 79 BPM | WEIGHT: 162 LBS | OXYGEN SATURATION: 98 %

## 2019-03-27 VITALS — HEIGHT: 63 IN | WEIGHT: 162.04 LBS | BODY MASS INDEX: 28.71 KG/M2

## 2019-03-27 DIAGNOSIS — C83.31 DIFFUSE LARGE B-CELL LYMPHOMA OF LYMPH NODES OF HEAD (HCC): Primary | ICD-10-CM

## 2019-03-27 LAB
INR PPP: 1.09 (ref 0.85–1.16)
PROTHROMBIN TIME: 13.6 SECONDS (ref 11.2–14.3)

## 2019-03-27 PROCEDURE — 36415 COLL VENOUS BLD VENIPUNCTURE: CPT

## 2019-03-27 PROCEDURE — 93005 ELECTROCARDIOGRAM TRACING: CPT

## 2019-03-27 PROCEDURE — 99215 OFFICE O/P EST HI 40 MIN: CPT | Performed by: NURSE PRACTITIONER

## 2019-03-27 PROCEDURE — 93010 ELECTROCARDIOGRAM REPORT: CPT | Performed by: INTERNAL MEDICINE

## 2019-03-27 PROCEDURE — 85610 PROTHROMBIN TIME: CPT | Performed by: SURGERY

## 2019-03-27 RX ORDER — PREDNISONE 50 MG/1
100 TABLET ORAL DAILY
Qty: 10 TABLET | Refills: 5 | Status: SHIPPED | OUTPATIENT
Start: 2019-03-27 | End: 2019-04-01

## 2019-03-27 RX ORDER — ONDANSETRON HYDROCHLORIDE 8 MG/1
8 TABLET, FILM COATED ORAL 3 TIMES DAILY PRN
Qty: 30 TABLET | Refills: 5 | Status: SHIPPED | OUTPATIENT
Start: 2019-03-27 | End: 2019-11-22

## 2019-03-27 NOTE — PROGRESS NOTES
CHEMOTHERAPY PREPARATION    Lanette Simpson  [id number]  1950    Chief Complaint: chemo education     History of present illness:  Lanette Simpson is a 68 y.o. year old female who is here today for chemotherapy preparation and needs assessment. The patient has been diagnosed with diffuse large B-cell lymphoma and is scheduled to begin treatment with R-CHOP.     Oncology History:     No history exists.       Past Medical History:   Diagnosis Date   • Arthritis    • Lymphoma (CMS/HCC)    • OAB (overactive bladder)    • Wears glasses        Past Surgical History:   Procedure Laterality Date   • BONE BIOPSY      jaw   • BONE MARROW BIOPSY     • COLONOSCOPY  2013   • TOTAL ABDOMINAL HYSTERECTOMY      bladder lift       MEDICATIONS: The current medication list was reviewed and reconciled.     Allergies:  has No Known Allergies.    Family History   Problem Relation Age of Onset   • Pancreatic cancer Mother    • Hypertension Mother    • Diabetes Father    • Prostate cancer Father    • Hypertension Sister    • Diabetes Brother    • Hypertension Brother    • Breast cancer Maternal Grandmother    • Stroke Maternal Grandfather    • Breast cancer Paternal Grandmother    • Glaucoma Paternal Grandfather    • Diabetes Brother    • Hypertension Brother          Review of Systems   Constitutional: Positive for fatigue and unexpected weight change. Negative for fever.   HENT: Negative for congestion, hearing loss, sore throat and trouble swallowing.    Eyes: Negative for visual disturbance.   Respiratory: Negative for cough, shortness of breath and wheezing.    Cardiovascular: Negative for chest pain and leg swelling.   Gastrointestinal: Negative for abdominal distention, abdominal pain, constipation, diarrhea, nausea and vomiting.   Endocrine: Negative.    Genitourinary: Negative.    Musculoskeletal: Negative for arthralgias, back pain and gait problem.   Skin: Negative.    Allergic/Immunologic: Negative.   "  Neurological: Negative for dizziness, weakness, numbness and headaches.   Hematological: Negative for adenopathy. Does not bruise/bleed easily.   Psychiatric/Behavioral: Positive for sleep disturbance.   All other systems reviewed and are negative.      Physical Exam  Vital Signs: /69   Pulse 79   Temp 97.9 °F (36.6 °C) (Temporal)   Resp 12   Ht 160 cm (63\")   Wt 73.5 kg (162 lb)   SpO2 98%   BMI 28.70 kg/m²    General Appearance:  alert, cooperative, no apparent distress and appears stated age   Neurologic/Psychiatric: A&O x 3, gait steady, appropriate affect   HEENT:  Normocephalic, without obvious abnormality, mucous membranes moist   Lungs:   Clear to auscultation bilaterally; respirations regular, even, and unlabored bilaterally   Heart:  Regular rate and rhythm, no murmurs appreciated   Extremities: Normal, atraumatic; no clubbing, cyanosis, or edema    Skin: No rashes, lesions, or abnormal coloration noted     ECOG Performance Status: (0) Fully active, able to carry on all predisease performance without restriction          NEEDS ASSESSMENTS    Genetics  The patient's new diagnosis and family history have been reviewed for genetic counseling needs. A genetic referral is not recommended.     Psychosocial  The patient has completed a PHQ-9 Depression Screening and the Distress Thermometer (DT) today.   PHQ-9 results show 1-4 (Minimal Depression). The patient scored their distress today as 5 on a scale of 0-10 with 0 being no distress and 10 being extreme distress.   Problems marked by the patient as being an issue for them within the last week include practical problems, family problems and emotional problems.   Results were reviewed along with psychosocial resources offered by our cancer center. Our oncology social worker will be flagged for a DT score of 4 or above, and a same day call will be made for a score of 9 or 10. A mental health referral is not recommended at this time. The patient is " "not accepting of a referral to [facility].   Copies of patient's questionnaires will be scanned into EMR for details and further reference.    Barriers to care  A barriers form was also completed by the patient today. We discussed services offered by our facility to help her have adequate access to care. The patient was given the name and card for our Oncology Social Worker, Gali Schmidt. Based upon barriers assessment today, the patient will not require a follow-up call from the  to further discuss needs.   A copy of the barriers form will also be scanned into EMR for details and further reference.     VAD Assessment  The patient and I discussed planned intervenous chemotherapy as well as other IV treatments that are often needed throughout the course of treatment. These may include, but are not limited to blood transfusions, antibiotics, and IV hydration. The vasculature does not appear to be adequate for multiple peripheral IVs throughout their treatment course. Discussed risks and benefits of VADs. The patient would like to pursue Port-A-Cath insertion prior to initiation of treatment. The patient has been contacted regarding port placement with Dr. Ling. She has been scheduled for 4/4/19, however we will try to move this up to be done prior to her starting chemotherapy.     Advanced Care Planning  The patient and I discussed advanced care planning, \"Conversations that Matter\".   This service was offered, free of charge, for development of advance directives with a certified ACP facilitator.  The patient does not have an up-to-date advanced directive. This document is not on file with our office. The patient is not interested in an appointment with one of our facilitators to create or update their advanced directives.      Palliative Care  The patient and I discussed palliative care services. Palliative care is not the same as Hospice care. This is specialized medical care for people " living with serious illness with the goal of improving quality of life for the patient and their family. [facility] has partnered with [facility] to offer our patients outpatient palliative care early along with their treatment to assist in coordination of care, symptom management, pain management, and medical decision making.  Oncology criteria for palliative care referral is not met at this time. The patient is not interested in a palliative care consultation.     Additional Referral needs  none      CHEMOTHERAPY EDUCATION    Booklets Given: Chemotherapy and You [x]  Eating Hints [x]    Sexuality/Fertility Books []      Chemotherapy/Biotherapy Education Sheets: (list all that apply)  nausea management, acid reflux management, diarrhea management, Cancer resourse contacts information, skin and mouth care and wig information                                                                                                                                                                 Chemotherapy Regimen:   Treatment Plans     Name Type Plan dates Plan Provider         Active    OP LYMPHOMA R-CHOP RiTUXimab / Cyclophosphamide / DOXOrubicin / VinCRIStine / PredniSONE / Pegfilgrastim ONCOLOGY TREATMENT  3/31/2019 - Present Joan Zuluaga MD                    TOPICS EDUCATION PROVIDED COMMENTS   ANEMIA:  role of RBC, cause, s/s, ways to manage, role of transfusion [x]    THROMBOCYTOPENIA:  role of platelet, cause, s/s, ways to prevent bleeding, things to avoid, when to seek help [x]    NEUTROPENIA:  role of WBC, cause, infection precautions, s/s of infection, when to call MD [x]    NUTRITION & APPETITE CHANGES:  importance of maintaining healthy diet & weight, ways to manage to improve intake, dietary consult, exercise regimen [x]    DIARRHEA:  causes, s/s of dehydration, ways to manage, dietary changes, when to call MD [x]    CONSTIPATION:  causes, ways to manage, dietary changes, when to call MD [x]     NAUSEA & VOMITING:  cause, use of antiemetics, dietary changes, when to call MD [x]    MOUTH SORES:  causes, oral care, ways to manage [x]    ALOPECIA:  cause, ways to manage, resources [x]    INFERTILITY & SEXUALITY:  causes, fertility preservation options, sexuality changes, ways to manage, importance of birth control []    NERVOUS SYSTEM CHANGES:  causes, s/s, neuropathies, cognitive changes, ways to manage [x]    PAIN:  causes, ways to manage [x] ????   SKIN & NAIL CHANGES:  cause, s/s, ways to manage [x]    ORGAN TOXICITIES:  cause, s/s, need for diagnostic tests, labs, when to notify MD [x]    SURVIVORSHIP:  distress, distress assessment, secondary malignancies, early/late effects, follow-up, social issues, social support [x]    HOME CARE:  use of spill kits, storing of PO chemo, how to manage bodily fluids [x]    MISCELLANEOUS:  drug interactions, administration, vesicant, et [x]        Assessment and Plan:    Diagnoses and all orders for this visit:    Diffuse large B-cell lymphoma of lymph nodes of head (CMS/HCC)  -     predniSONE (DELTASONE) 50 MG tablet; Take 2 tablets by mouth Daily for 5 doses. Take with food.  Bring the first dose to chemotherapy appointment.  -     ondansetron (ZOFRAN) 8 MG tablet; Take 1 tablet by mouth 3 (Three) Times a Day As Needed for Nausea or Vomiting.        This was a 60 minute face-to-face visit with 60 minutes spent in  counseling and coordination of care as documented above.   The patient and I have reviewed their new cancer diagnosis and scheduled treatment plan. Needs assessment was completed including genetics, psychosocial needs, barriers to care, VAD evaluation, advanced care planning, and palliative care services. Referrals have been ordered as appropriate based upon our evaluation and patient desires.     Chemotherapy teaching was also completed today as documented above. Adequate time was given to answer all questions to her satisfaction. Patient and family are  aware of their care team members and contact information if they have questions or problems throughout the treatment course. Needs assessments and education has been completed. The patient is adequately prepared to begin treatment as scheduled.     RX sent in for Prednisone and Zofran. Advised to take Zofran every 8 hours as needed for nausea. I told her to bring prednisone with her to infusion to start dose here, and then take 2 tablets daily for a total of 5 days with each treatment cycle.     The patient was given information on Neulasta. I advised she may experience bone pain as a side effects. I recommended use of Tylenol/Advil/Aleve as well as daily Claritin 10 mg to help ease her symptoms.     I advised she can use over the counter treatment for management of constipation/diarrhea/cold symptoms, and mild aches and pains.     I reviewed the option for [facility] visits if needed for evaluation and management of side effects related to treatment.     The patient has received RX for EMLA cream to be applied prior to port access. We will have her scheduled for port placement.     The patient was given a list of local wig retailers for options for purchasing a wig to use during treatment.     Migdalia Blanca, [facility]

## 2019-03-28 ENCOUNTER — HOSPITAL ENCOUNTER (OUTPATIENT)
Dept: CARDIOLOGY | Facility: HOSPITAL | Age: 69
Discharge: HOME | End: 2019-03-28

## 2019-03-28 ENCOUNTER — HOSPITAL ENCOUNTER (OUTPATIENT)
Dept: PET IMAGING | Facility: HOSPITAL | Age: 69
Discharge: HOME | End: 2019-03-28

## 2019-03-28 ENCOUNTER — HOSPITAL ENCOUNTER (OUTPATIENT)
Dept: PET IMAGING | Facility: HOSPITAL | Age: 69
Discharge: HOME | End: 2019-03-28
Admitting: INTERNAL MEDICINE

## 2019-03-28 VITALS — BODY MASS INDEX: 28.7 KG/M2 | WEIGHT: 162 LBS | HEIGHT: 63 IN

## 2019-03-28 DIAGNOSIS — C83.31 DIFFUSE LARGE B-CELL LYMPHOMA OF LYMPH NODES OF HEAD (HCC): ICD-10-CM

## 2019-03-28 DIAGNOSIS — Z51.11 ENCOUNTER FOR ANTINEOPLASTIC CHEMOTHERAPY: ICD-10-CM

## 2019-03-28 LAB
BH CV ECHO MEAS - AO ROOT AREA (BSA CORRECTED): 1.8
BH CV ECHO MEAS - AO ROOT AREA: 8.3 CM^2
BH CV ECHO MEAS - AO ROOT DIAM: 3.2 CM
BH CV ECHO MEAS - BSA(HAYCOCK): 1.8 M^2
BH CV ECHO MEAS - BSA: 1.8 M^2
BH CV ECHO MEAS - BZI_BMI: 28.7 KILOGRAMS/M^2
BH CV ECHO MEAS - BZI_METRIC_HEIGHT: 160 CM
BH CV ECHO MEAS - BZI_METRIC_WEIGHT: 73.5 KG
BH CV ECHO MEAS - EDV(CUBED): 74.4 ML
BH CV ECHO MEAS - EDV(MOD-SP2): 79 ML
BH CV ECHO MEAS - EDV(MOD-SP4): 108 ML
BH CV ECHO MEAS - EDV(TEICH): 78.8 ML
BH CV ECHO MEAS - EF(CUBED): 68.3 %
BH CV ECHO MEAS - EF(MOD-BP): 65 %
BH CV ECHO MEAS - EF(MOD-SP2): 67.1 %
BH CV ECHO MEAS - EF(MOD-SP4): 63 %
BH CV ECHO MEAS - EF(TEICH): 60.3 %
BH CV ECHO MEAS - ESV(CUBED): 23.6 ML
BH CV ECHO MEAS - ESV(MOD-SP2): 26 ML
BH CV ECHO MEAS - ESV(MOD-SP4): 40 ML
BH CV ECHO MEAS - ESV(TEICH): 31.3 ML
BH CV ECHO MEAS - FS: 31.8 %
BH CV ECHO MEAS - IVS/LVPW: 0.94
BH CV ECHO MEAS - IVSD: 1.2 CM
BH CV ECHO MEAS - LA DIMENSION: 3.6 CM
BH CV ECHO MEAS - LA/AO: 1.1
BH CV ECHO MEAS - LAD MAJOR: 5.5 CM
BH CV ECHO MEAS - LAT PEAK E' VEL: 8.3 CM/SEC
BH CV ECHO MEAS - LATERAL E/E' RATIO: 6.8
BH CV ECHO MEAS - LV DIASTOLIC VOL/BSA (35-75): 61.1 ML/M^2
BH CV ECHO MEAS - LV MASS(C)D: 186.1 GRAMS
BH CV ECHO MEAS - LV MASS(C)DI: 105.3 GRAMS/M^2
BH CV ECHO MEAS - LV MAX PG: 3.7 MMHG
BH CV ECHO MEAS - LV MEAN PG: 1.5 MMHG
BH CV ECHO MEAS - LV SYSTOLIC VOL/BSA (12-30): 22.6 ML/M^2
BH CV ECHO MEAS - LV V1 MAX: 95.8 CM/SEC
BH CV ECHO MEAS - LV V1 MEAN: 54.6 CM/SEC
BH CV ECHO MEAS - LV V1 VTI: 22.5 CM
BH CV ECHO MEAS - LVIDD: 4.2 CM
BH CV ECHO MEAS - LVIDS: 2.9 CM
BH CV ECHO MEAS - LVLD AP2: 8.2 CM
BH CV ECHO MEAS - LVLD AP4: 8.3 CM
BH CV ECHO MEAS - LVLS AP2: 6 CM
BH CV ECHO MEAS - LVLS AP4: 6.5 CM
BH CV ECHO MEAS - LVOT AREA (M): 3.1 CM^2
BH CV ECHO MEAS - LVOT AREA: 3 CM^2
BH CV ECHO MEAS - LVOT DIAM: 2 CM
BH CV ECHO MEAS - LVPWD: 1.3 CM
BH CV ECHO MEAS - MED PEAK E' VEL: 8.1 CM/SEC
BH CV ECHO MEAS - MEDIAL E/E' RATIO: 7
BH CV ECHO MEAS - MV A MAX VEL: 93.3 CM/SEC
BH CV ECHO MEAS - MV DEC TIME: 0.19 SEC
BH CV ECHO MEAS - MV E MAX VEL: 58.2 CM/SEC
BH CV ECHO MEAS - MV E/A: 0.62
BH CV ECHO MEAS - PA ACC SLOPE: 1225 CM/SEC^2
BH CV ECHO MEAS - PA ACC TIME: 0.07 SEC
BH CV ECHO MEAS - PA PR(ACCEL): 46.9 MMHG
BH CV ECHO MEAS - RAP SYSTOLE: 8 MMHG
BH CV ECHO MEAS - RVDD: 2.4 CM
BH CV ECHO MEAS - RVSP: 25 MMHG
BH CV ECHO MEAS - SI(CUBED): 28.8 ML/M^2
BH CV ECHO MEAS - SI(LVOT): 38.1 ML/M^2
BH CV ECHO MEAS - SI(MOD-SP2): 30 ML/M^2
BH CV ECHO MEAS - SI(MOD-SP4): 38.5 ML/M^2
BH CV ECHO MEAS - SI(TEICH): 26.9 ML/M^2
BH CV ECHO MEAS - SV(CUBED): 50.8 ML
BH CV ECHO MEAS - SV(LVOT): 67.3 ML
BH CV ECHO MEAS - SV(MOD-SP2): 53 ML
BH CV ECHO MEAS - SV(MOD-SP4): 68 ML
BH CV ECHO MEAS - SV(TEICH): 47.5 ML
BH CV ECHO MEAS - TAPSE (>1.6): 2.5 CM2
BH CV ECHO MEAS - TR MAX PG: 17 MMHG
BH CV ECHO MEAS - TR MAX VEL: 202.2 CM/SEC
BH CV ECHO MEASUREMENTS AVERAGE E/E' RATIO: 7.1
BH CV VAS BP LEFT ARM: NORMAL MMHG
BH CV XLRA - RV BASE: 3.8 CM
BH CV XLRA - RV LENGTH: 5.2 CM
BH CV XLRA - RV MID: 3 CM
BH CV XLRA - TDI S': 17.7 CM/SEC
GLUCOSE BLDC GLUCOMTR-MCNC: 80 MG/DL (ref 70–130)
LEFT ATRIUM VOLUME INDEX: 28.3 ML/M^2
LEFT ATRIUM VOLUME: 50 ML
MAXIMAL PREDICTED HEART RATE: 151 BPM
STRESS TARGET HR: 128 BPM

## 2019-03-28 PROCEDURE — 78815 PET IMAGE W/CT SKULL-THIGH: CPT

## 2019-03-28 PROCEDURE — A9552 F18 FDG: HCPCS | Performed by: INTERNAL MEDICINE

## 2019-03-28 PROCEDURE — 93306 TTE W/DOPPLER COMPLETE: CPT

## 2019-03-28 PROCEDURE — 0 FLUDEOXYGLUCOSE F18 SOLUTION: Performed by: INTERNAL MEDICINE

## 2019-03-28 PROCEDURE — 25010000002 SULFUR HEXAFLUORIDE MICROSPH 60.7-25 MG RECONSTITUTED SUSPENSION: Performed by: INTERNAL MEDICINE

## 2019-03-28 PROCEDURE — 93306 TTE W/DOPPLER COMPLETE: CPT | Performed by: INTERNAL MEDICINE

## 2019-03-28 PROCEDURE — 82962 GLUCOSE BLOOD TEST: CPT

## 2019-03-28 RX ADMIN — SULFUR HEXAFLUORIDE 3 ML: KIT at 16:00

## 2019-03-28 RX ADMIN — FLUDEOXYGLUCOSE F18 1 DOSE: 300 INJECTION INTRAVENOUS at 12:28

## 2019-03-29 LAB
LAB AP ASPIRATE SMEAR: NORMAL
LAB AP CASE REPORT: NORMAL
LAB AP CBC AND DIFFERENTIAL: NORMAL
LAB AP CLINICAL INFORMATION: NORMAL
LAB AP CLOT SECTION: NORMAL
LAB AP CORE BIOPSY: NORMAL
LAB AP DIAGNOSIS COMMENT: NORMAL
LAB AP FLOW CYTOMETRY SUMMARY: NORMAL
PATH REPORT.FINAL DX SPEC: NORMAL
PATH REPORT.GROSS SPEC: NORMAL

## 2019-04-01 ENCOUNTER — APPOINTMENT (OUTPATIENT)
Dept: ONCOLOGY | Facility: HOSPITAL | Age: 69
End: 2019-04-01

## 2019-04-03 ENCOUNTER — ANESTHESIA EVENT (OUTPATIENT)
Dept: PERIOP | Facility: HOSPITAL | Age: 69
End: 2019-04-03

## 2019-04-03 RX ORDER — SODIUM CHLORIDE 0.9 % (FLUSH) 0.9 %
3 SYRINGE (ML) INJECTION EVERY 12 HOURS SCHEDULED
Status: CANCELLED | OUTPATIENT
Start: 2019-04-03

## 2019-04-03 RX ORDER — FAMOTIDINE 10 MG/ML
20 INJECTION, SOLUTION INTRAVENOUS ONCE
Status: CANCELLED | OUTPATIENT
Start: 2019-04-03 | End: 2019-04-03

## 2019-04-03 RX ORDER — MELOXICAM 7.5 MG/1
TABLET ORAL
Qty: 30 TABLET | Refills: 0 | Status: SHIPPED | OUTPATIENT
Start: 2019-04-03 | End: 2019-05-01 | Stop reason: SDUPTHER

## 2019-04-03 RX ORDER — SODIUM CHLORIDE 0.9 % (FLUSH) 0.9 %
3-10 SYRINGE (ML) INJECTION AS NEEDED
Status: CANCELLED | OUTPATIENT
Start: 2019-04-03

## 2019-04-04 ENCOUNTER — HOSPITAL ENCOUNTER (OUTPATIENT)
Facility: HOSPITAL | Age: 69
Setting detail: HOSPITAL OUTPATIENT SURGERY
Discharge: HOME | End: 2019-04-04
Attending: SURGERY | Admitting: SURGERY

## 2019-04-04 ENCOUNTER — APPOINTMENT (OUTPATIENT)
Dept: GENERAL RADIOLOGY | Facility: HOSPITAL | Age: 69
End: 2019-04-04

## 2019-04-04 ENCOUNTER — ANESTHESIA (OUTPATIENT)
Dept: PERIOP | Facility: HOSPITAL | Age: 69
End: 2019-04-04

## 2019-04-04 VITALS
HEART RATE: 82 BPM | DIASTOLIC BLOOD PRESSURE: 54 MMHG | OXYGEN SATURATION: 92 % | SYSTOLIC BLOOD PRESSURE: 109 MMHG | TEMPERATURE: 98.3 F | RESPIRATION RATE: 16 BRPM

## 2019-04-04 PROCEDURE — 76000 FLUOROSCOPY <1 HR PHYS/QHP: CPT

## 2019-04-04 PROCEDURE — 25010000002 HEPARIN (PORCINE) PER 1000 UNITS: Performed by: SURGERY

## 2019-04-04 PROCEDURE — 71045 X-RAY EXAM CHEST 1 VIEW: CPT

## 2019-04-04 PROCEDURE — C1788 PORT, INDWELLING, IMP: HCPCS | Performed by: SURGERY

## 2019-04-04 PROCEDURE — 25010000002 PROPOFOL 10 MG/ML EMULSION: Performed by: NURSE ANESTHETIST, CERTIFIED REGISTERED

## 2019-04-04 PROCEDURE — 25010000003 CEFAZOLIN IN DEXTROSE 2-4 GM/100ML-% SOLUTION: Performed by: SURGERY

## 2019-04-04 DEVICE — POWERPORT CLEARVUE ISP IMPLANTABLE PORT WITH ATTACHABLE 8F POLYURETHANE OPEN-ENDED SINGLE-LUMEN VENOUS CATHETER PROCEDURAL KIT
Type: IMPLANTABLE DEVICE | Status: FUNCTIONAL
Brand: POWERPORT CLEARVUE

## 2019-04-04 RX ORDER — ONDANSETRON 2 MG/ML
4 INJECTION INTRAMUSCULAR; INTRAVENOUS ONCE AS NEEDED
Status: DISCONTINUED | OUTPATIENT
Start: 2019-04-04 | End: 2019-04-04 | Stop reason: HOSPADM

## 2019-04-04 RX ORDER — PROPOFOL 10 MG/ML
VIAL (ML) INTRAVENOUS AS NEEDED
Status: DISCONTINUED | OUTPATIENT
Start: 2019-04-04 | End: 2019-04-04 | Stop reason: SURG

## 2019-04-04 RX ORDER — FAMOTIDINE 20 MG/1
20 TABLET, FILM COATED ORAL ONCE
Status: COMPLETED | OUTPATIENT
Start: 2019-04-04 | End: 2019-04-04

## 2019-04-04 RX ORDER — ACETAMINOPHEN 325 MG/1
650 TABLET ORAL ONCE
Status: DISCONTINUED | OUTPATIENT
Start: 2019-04-04 | End: 2019-04-04 | Stop reason: HOSPADM

## 2019-04-04 RX ORDER — BUPIVACAINE HYDROCHLORIDE AND EPINEPHRINE 5; 5 MG/ML; UG/ML
INJECTION, SOLUTION PERINEURAL AS NEEDED
Status: DISCONTINUED | OUTPATIENT
Start: 2019-04-04 | End: 2019-04-04 | Stop reason: HOSPADM

## 2019-04-04 RX ORDER — SODIUM CHLORIDE 9 MG/ML
INJECTION, SOLUTION INTRAVENOUS CONTINUOUS PRN
Status: DISCONTINUED | OUTPATIENT
Start: 2019-04-04 | End: 2019-04-04 | Stop reason: SURG

## 2019-04-04 RX ORDER — LIDOCAINE HYDROCHLORIDE 10 MG/ML
INJECTION, SOLUTION INFILTRATION; PERINEURAL AS NEEDED
Status: DISCONTINUED | OUTPATIENT
Start: 2019-04-04 | End: 2019-04-04 | Stop reason: SURG

## 2019-04-04 RX ORDER — PROPOFOL 10 MG/ML
VIAL (ML) INTRAVENOUS CONTINUOUS PRN
Status: DISCONTINUED | OUTPATIENT
Start: 2019-04-04 | End: 2019-04-04 | Stop reason: SURG

## 2019-04-04 RX ORDER — DOCUSATE SODIUM 100 MG/1
100 CAPSULE, LIQUID FILLED ORAL ONCE
Status: DISCONTINUED | OUTPATIENT
Start: 2019-04-04 | End: 2019-04-04 | Stop reason: HOSPADM

## 2019-04-04 RX ORDER — LIDOCAINE HYDROCHLORIDE 10 MG/ML
0.5 INJECTION, SOLUTION EPIDURAL; INFILTRATION; INTRACAUDAL; PERINEURAL ONCE AS NEEDED
Status: COMPLETED | OUTPATIENT
Start: 2019-04-04 | End: 2019-04-04

## 2019-04-04 RX ORDER — SODIUM CHLORIDE, SODIUM LACTATE, POTASSIUM CHLORIDE, CALCIUM CHLORIDE 600; 310; 30; 20 MG/100ML; MG/100ML; MG/100ML; MG/100ML
9 INJECTION, SOLUTION INTRAVENOUS CONTINUOUS
Status: DISCONTINUED | OUTPATIENT
Start: 2019-04-04 | End: 2019-04-04 | Stop reason: HOSPADM

## 2019-04-04 RX ORDER — PROMETHAZINE HYDROCHLORIDE 25 MG/ML
12.5 INJECTION, SOLUTION INTRAMUSCULAR; INTRAVENOUS ONCE AS NEEDED
Status: DISCONTINUED | OUTPATIENT
Start: 2019-04-04 | End: 2019-04-04 | Stop reason: HOSPADM

## 2019-04-04 RX ORDER — CEFAZOLIN SODIUM 2 G/100ML
2 INJECTION, SOLUTION INTRAVENOUS ONCE
Status: COMPLETED | OUTPATIENT
Start: 2019-04-04 | End: 2019-04-04

## 2019-04-04 RX ORDER — HYDROCODONE BITARTRATE AND ACETAMINOPHEN 5; 325 MG/1; MG/1
1 TABLET ORAL ONCE AS NEEDED
Status: DISCONTINUED | OUTPATIENT
Start: 2019-04-04 | End: 2019-04-04 | Stop reason: HOSPADM

## 2019-04-04 RX ADMIN — PROPOFOL 20 MG: 10 INJECTION, EMULSION INTRAVENOUS at 08:56

## 2019-04-04 RX ADMIN — SODIUM CHLORIDE, POTASSIUM CHLORIDE, SODIUM LACTATE AND CALCIUM CHLORIDE 9 ML/HR: 600; 310; 30; 20 INJECTION, SOLUTION INTRAVENOUS at 07:53

## 2019-04-04 RX ADMIN — CEFAZOLIN SODIUM 2 G: 2 INJECTION, SOLUTION INTRAVENOUS at 08:45

## 2019-04-04 RX ADMIN — LIDOCAINE HYDROCHLORIDE 0.5 ML: 10 INJECTION, SOLUTION EPIDURAL; INFILTRATION; INTRACAUDAL; PERINEURAL at 07:53

## 2019-04-04 RX ADMIN — SODIUM CHLORIDE: 9 INJECTION, SOLUTION INTRAVENOUS at 08:50

## 2019-04-04 RX ADMIN — PROPOFOL 60 MG: 10 INJECTION, EMULSION INTRAVENOUS at 08:49

## 2019-04-04 RX ADMIN — PROPOFOL 75 MCG/KG/MIN: 10 INJECTION, EMULSION INTRAVENOUS at 08:49

## 2019-04-04 RX ADMIN — LIDOCAINE HYDROCHLORIDE 50 MG: 10 INJECTION, SOLUTION INFILTRATION; PERINEURAL at 08:49

## 2019-04-04 RX ADMIN — FAMOTIDINE 20 MG: 20 TABLET ORAL at 07:53

## 2019-04-04 NOTE — ANESTHESIA POSTPROCEDURE EVALUATION
Patient: Lanette Simpson    Procedure Summary     Date:  04/04/19 Room / Location:   [facility] [address] [place] [address] [facility]    Anesthesia Start:  0845 Anesthesia Stop:      Procedure:  PORT PLACEMENT (N/A ) Diagnosis:      Surgeon:  Alan Ling MD Provider:  Anders Barbosa MD    Anesthesia Type:  MAC ASA Status:  3          Anesthesia Type: MAC  Last vitals  BP   125/68 (04/04/19 0733)   Temp   98.3 °F (36.8 °C) (04/04/19 0733)   Pulse   76 (04/04/19 0733)   Resp   14 (04/04/19 0733)     SpO2   99 % (04/04/19 0733)     Post Anesthesia Care and Evaluation    Patient location during evaluation: PACU  Patient participation: complete - patient participated  Level of consciousness: awake and alert  Pain score: 0  Pain management: adequate  Airway patency: patent  Anesthetic complications: No anesthetic complications  PONV Status: none  Cardiovascular status: hemodynamically stable and acceptable  Respiratory status: nonlabored ventilation, acceptable and nasal cannula  Hydration status: acceptable

## 2019-04-04 NOTE — ANESTHESIA PREPROCEDURE EVALUATION
Anesthesia Evaluation                  Airway   Mallampati: II  Dental      Pulmonary    Cardiovascular         Neuro/Psych  GI/Hepatic/Renal/Endo    (+)  GERD,      Musculoskeletal     Abdominal    Substance History      OB/GYN          Other                        Anesthesia Plan    ASA 3     MAC     intravenous induction   Anesthetic plan, all risks, benefits, and alternatives have been provided, discussed and informed consent has been obtained with: patient.

## 2019-04-04 NOTE — OP NOTE
INSERTION VENOUS ACCESS DEVICE  Procedure Report    Patient Name:  Lanette Simpson  YOB: 1950    Date of Surgery:  04/04/19 9:23 AM     Indications: This patient presented with lymphoma and was in need of durable central venous access for chemotherapy.  She was explained at length the risks and benefits of port placement.  She expressed understanding was anxious to proceed    Pre-op Diagnosis: Diffuse B-cell lymphoma    Post-op Diagnosis: Same    Procedure:  Right subclavian PowerPort placement  Direct fluoroscopy    Surgeon: Alan Ling MD    Staff:  Surgeon(s):  Alan Ling MD         Anesthesia: Monitor Anesthesia Care    Estimated Blood Loss: none    Implants:    Implant Name Type Inv. Item Serial No.  Lot No. LRB No. Used   POWEDR PORT      [id number] Right 1       Specimen:          None      Findings: Fluoroscopy guided right subclavian access  Modified Seldinger technique under fluoroscopic guidance  Functional port at completion    Complications: None    Description of Procedure: After obtaining consent from the patient she was brought to the operating room and she underwent monitored anesthesia care without obvious difficulty.  Shoulder roll was placed in both arms were tucked with all pressure points cushioned.  Sequential compression devices were positioned.  Bilateral neck and chest were prepped and draped in sterile fashion.  Intravenous antibiotics were given prior to incision and her name and planned procedure verified prior to surgery.  Local anesthesia was infused in the soft tissues the right chest and neck.  An introducer needle was placed in the subclavian vein followed by a guidewire placed in the right heart system with its position verified with fluoroscopy.  A subcutaneous pocket was developed with Bovie cautery.  2 stay sutures of Prolene were placed at the 10 and 2:00 positions and secured to the port.  The catheter was then measured  to appropriate length  with fluoroscopy.  Then using constant fluoroscopic guidance and modified Seldinger technique an introducer sheath was placed over the wire into the right heart system.  The wire was removed and catheter placed through the sheath.  The sheath was removed and fluoroscopy verified appropriate position without kinking the port was accessed and was easily withdrawal venous blood and flushed with heparinized saline therefore the 2 Prolene sutures were secured the incision was closed with 2 layers of suture and skin affix the stab incision with a single suture and skin affix the patient awoke and was transferred to recovery room in stable condition.  All counts were correct at operation completion.  I performed the entire procedure      Alan Ling MD     Date: 4/4/2019  Time: 9:23 AM

## 2019-04-04 NOTE — H&P
Pre-Op H&P  Lanette Simpson  [id number]  1950    Chief complaint: Right jaw pain     HPI:    Patient is a 68 y.o.female who presented to dentist following a right lower tooth extraction with pain, numbness in her lower lip and dysphagia.  Referred to  oral surgeon with CT jaw revealing multiple contrast enhancing lesions of the lower gum with biopsy revealing diffuse large B-cell lymphoma.  Follows with Dr. Zuluaga.  Plans to initiate R CHOP chemotherapy and here today to undergo port placemnet    Review of Systems:  General ROS: negative for chills, fever or skin lesions;  No changes since last office visit  Cardiovascular ROS: no chest pain or dyspnea on exertion  · 3/28/19 TTE:  Calculated EF = 65.0%.  · Left ventricular wall thickness is consistent with mild concentric hypertrophy.  · Left ventricular systolic function is normal.  · Left ventricular diastolic dysfunction (grade I) consistent with impaired relaxation.  No significant valvular abnormality.  Respiratory ROS: no cough, shortness of breath, or wheezing    Allergies: No Known Allergies    Home Meds:    No current facility-administered medications on file prior to encounter.      Current Outpatient Medications on File Prior to Encounter   Medication Sig Dispense Refill   • CALCIUM PO Take 600 mg by mouth Daily.     • meloxicam (MOBIC) 7.5 MG tablet TAKE ONE TABLET BY MOUTH DAILY 30 tablet 0   • Multiple Vitamins-Minerals (MULTIVITAMIN ADULT PO) Take 1 tablet by mouth Daily.     • ondansetron (ZOFRAN) 8 MG tablet Take 1 tablet by mouth 3 (Three) Times a Day As Needed for Nausea or Vomiting. 30 tablet 5   • oxybutynin XL (DITROPAN-XL) 10 MG 24 hr tablet TAKE 1 TABLET EVERY DAY 90 tablet 1   • lidocaine-prilocaine (EMLA) 2.5-2.5 % cream Apply  topically to the appropriate area as directed As Needed (45-60 minutes prior to port access.  Cover with saran/plastic wrap.). (Patient taking differently: Apply 1 application topically to the appropriate  area as directed As Needed (45-60 minutes prior to port access.  Cover with saran/plastic wrap.).) 30 g 3       PMH:   Past Medical History:   Diagnosis Date   • Arthritis    • Lymphoma (CMS/HCC)    • OAB (overactive bladder)    • Wears glasses      PSH:    Past Surgical History:   Procedure Laterality Date   • BONE BIOPSY      jaw   • BONE MARROW BIOPSY     • COLONOSCOPY  2013   • TOTAL ABDOMINAL HYSTERECTOMY      bladder lift     Social History:   Tobacco:   Social History     Tobacco Use   Smoking Status Never Smoker   Smokeless Tobacco Never Used      Alcohol:     Social History     Substance and Sexual Activity   Alcohol Use No       Vitals:           /68 (BP Location: Right arm, Patient Position: Lying)   Pulse 76   Temp 98.3 °F (36.8 °C) (Temporal)   Resp 14   SpO2 99%     Physical Exam:  General Appearance:    Alert, cooperative, no distress, appears stated age   Head:    Normocephalic, without obvious abnormality, atraumatic   Lungs:     Clear to auscultation bilaterally, respirations unlabored    Heart:   Regular rate and rhythm, S1 and S2 normal, no murmur, rub    or gallop    Abdomen:    Soft, non-tender.  +bowel sounds   Breast Exam:    deferred   Genitalia:    deferred   Extremities:   Extremities normal, atraumatic, no cyanosis or edema   Skin:   Skin color, texture, turgor normal, no rashes or lesions   Neurologic:   Grossly intact   Results Review  LABS:  Lab Results   Component Value Date    WBC 9.33 03/25/2019    HGB 15.8 (H) 03/25/2019    HCT 47.9 (H) 03/25/2019    MCV 92.8 03/25/2019     (H) 03/25/2019    NEUTROABS 5.40 03/25/2019    GLUCOSE 80 03/25/2019    BUN 21 03/25/2019    CREATININE 0.74 03/25/2019    EGFRIFNONA 78 03/25/2019     03/25/2019    K 4.9 03/25/2019     03/25/2019    CO2 24.0 03/25/2019    CALCIUM 9.8 03/25/2019    ALBUMIN 4.63 03/25/2019    AST 29 03/25/2019    ALT 27 03/25/2019    BILITOT 0.6 03/25/2019       RADIOLOGY:  Imaging Results (last 72  hours)     ** No results found for the last 72 hours. **          I reviewed the patient's new clinical results.    Cancer Staging (if applicable)  Cancer Patient: _x_ yes __no __unknown; If yes, clinical stage T:__ N:__M:__, stage group or __N/A    Impression: Mandible diffuse large B-cell lymphoma and plans to initiate R CHOP chemotherapy    Plan: Port placement    Nay Ludwig, APRN   4/4/2019   7:53 AM

## 2019-04-08 ENCOUNTER — HOSPITAL ENCOUNTER (OUTPATIENT)
Dept: ONCOLOGY | Facility: HOSPITAL | Age: 69
Setting detail: INFUSION SERIES
Discharge: HOME | End: 2019-04-08

## 2019-04-08 ENCOUNTER — OFFICE VISIT (OUTPATIENT)
Dept: ONCOLOGY | Facility: CLINIC | Age: 69
End: 2019-04-08

## 2019-04-08 ENCOUNTER — DOCUMENTATION (OUTPATIENT)
Dept: SOCIAL WORK | Facility: HOSPITAL | Age: 69
End: 2019-04-08

## 2019-04-08 ENCOUNTER — EDUCATION (OUTPATIENT)
Dept: ONCOLOGY | Facility: HOSPITAL | Age: 69
End: 2019-04-08

## 2019-04-08 ENCOUNTER — DOCUMENTATION (OUTPATIENT)
Dept: NUTRITION | Facility: HOSPITAL | Age: 69
End: 2019-04-08

## 2019-04-08 VITALS — HEART RATE: 98 BPM | DIASTOLIC BLOOD PRESSURE: 72 MMHG | TEMPERATURE: 97.5 F | SYSTOLIC BLOOD PRESSURE: 145 MMHG

## 2019-04-08 VITALS
OXYGEN SATURATION: 98 % | HEIGHT: 63 IN | RESPIRATION RATE: 12 BRPM | TEMPERATURE: 97.5 F | HEART RATE: 69 BPM | WEIGHT: 160 LBS | DIASTOLIC BLOOD PRESSURE: 73 MMHG | BODY MASS INDEX: 28.35 KG/M2 | SYSTOLIC BLOOD PRESSURE: 125 MMHG

## 2019-04-08 DIAGNOSIS — C83.31 DIFFUSE LARGE B-CELL LYMPHOMA OF LYMPH NODES OF HEAD (HCC): Primary | ICD-10-CM

## 2019-04-08 LAB
ALBUMIN SERPL-MCNC: 4.1 G/DL (ref 3.5–5.2)
ALBUMIN/GLOB SERPL: 1.2 G/DL
ALP SERPL-CCNC: 62 U/L (ref 39–117)
ALT SERPL W P-5'-P-CCNC: 11 U/L (ref 1–33)
ANION GAP SERPL CALCULATED.3IONS-SCNC: 11 MMOL/L
AST SERPL-CCNC: 23 U/L (ref 1–32)
BILIRUB SERPL-MCNC: 0.7 MG/DL (ref 0.2–1.2)
BUN BLD-MCNC: 20 MG/DL (ref 8–23)
BUN/CREAT SERPL: 28.2 (ref 7–25)
CALCIUM SPEC-SCNC: 9 MG/DL (ref 8.6–10.5)
CHLORIDE SERPL-SCNC: 107 MMOL/L (ref 98–107)
CO2 SERPL-SCNC: 25 MMOL/L (ref 22–29)
CREAT BLD-MCNC: 0.71 MG/DL (ref 0.57–1)
ERYTHROCYTE [DISTWIDTH] IN BLOOD BY AUTOMATED COUNT: 15.3 % (ref 12.3–15.4)
GFR SERPL CREATININE-BSD FRML MDRD: 82 ML/MIN/1.73
GLOBULIN UR ELPH-MCNC: 3.4 GM/DL
GLUCOSE BLD-MCNC: 94 MG/DL (ref 65–99)
HCT VFR BLD AUTO: 40 % (ref 34–46.6)
HGB BLD-MCNC: 13.9 G/DL (ref 12–15.9)
LYMPHOCYTES # BLD AUTO: 2.3 10*3/MM3 (ref 0.7–3.1)
LYMPHOCYTES NFR BLD AUTO: 29.7 % (ref 19.6–45.3)
MCH RBC QN AUTO: 32 PG (ref 26.6–33)
MCHC RBC AUTO-ENTMCNC: 34.7 G/DL (ref 31.5–35.7)
MCV RBC AUTO: 92.2 FL (ref 79–97)
MONOCYTES # BLD AUTO: 0.5 10*3/MM3 (ref 0.1–0.9)
MONOCYTES NFR BLD AUTO: 6.5 % (ref 5–12)
NEUTROPHILS # BLD AUTO: 4.9 10*3/MM3 (ref 1.4–7)
NEUTROPHILS NFR BLD AUTO: 63.8 % (ref 42.7–76)
PLATELET # BLD AUTO: 564 10*3/MM3 (ref 140–450)
PMV BLD AUTO: 7.9 FL (ref 6–12)
POTASSIUM BLD-SCNC: 4.2 MMOL/L (ref 3.5–5.2)
PROT SERPL-MCNC: 7.5 G/DL (ref 6–8.5)
RBC # BLD AUTO: 4.33 10*6/MM3 (ref 3.77–5.28)
SODIUM BLD-SCNC: 143 MMOL/L (ref 136–145)
WBC NRBC COR # BLD: 7.7 10*3/MM3 (ref 3.4–10.8)

## 2019-04-08 PROCEDURE — 96367 TX/PROPH/DG ADDL SEQ IV INF: CPT

## 2019-04-08 PROCEDURE — 96377 APPLICATON ON-BODY INJECTOR: CPT

## 2019-04-08 PROCEDURE — 25010000002 DOXORUBICIN PER 10 MG: Performed by: INTERNAL MEDICINE

## 2019-04-08 PROCEDURE — 25010000002 VINCRISTINE PER 1 MG: Performed by: INTERNAL MEDICINE

## 2019-04-08 PROCEDURE — 25010000002 DEXAMETHASONE PER 1 MG: Performed by: INTERNAL MEDICINE

## 2019-04-08 PROCEDURE — 96415 CHEMO IV INFUSION ADDL HR: CPT

## 2019-04-08 PROCEDURE — 25010000002 FOSAPREPITANT PER 1 MG: Performed by: INTERNAL MEDICINE

## 2019-04-08 PROCEDURE — 96375 TX/PRO/DX INJ NEW DRUG ADDON: CPT

## 2019-04-08 PROCEDURE — 25010000002 RITUXIMAB 10 MG/ML SOLUTION 50 ML VIAL: Performed by: INTERNAL MEDICINE

## 2019-04-08 PROCEDURE — 96366 THER/PROPH/DIAG IV INF ADDON: CPT

## 2019-04-08 PROCEDURE — 25010000002 DIPHENHYDRAMINE PER 50 MG: Performed by: INTERNAL MEDICINE

## 2019-04-08 PROCEDURE — 96417 CHEMO IV INFUS EACH ADDL SEQ: CPT

## 2019-04-08 PROCEDURE — 96413 CHEMO IV INFUSION 1 HR: CPT

## 2019-04-08 PROCEDURE — 25010000002 PALONOSETRON PER 25 MCG: Performed by: INTERNAL MEDICINE

## 2019-04-08 PROCEDURE — 96411 CHEMO IV PUSH ADDL DRUG: CPT

## 2019-04-08 PROCEDURE — 25010000002 PEGFILGRASTIM 6 MG/0.6ML PREFILLED SYRINGE KIT: Performed by: INTERNAL MEDICINE

## 2019-04-08 PROCEDURE — 99215 OFFICE O/P EST HI 40 MIN: CPT | Performed by: INTERNAL MEDICINE

## 2019-04-08 PROCEDURE — 85025 COMPLETE CBC W/AUTO DIFF WBC: CPT | Performed by: INTERNAL MEDICINE

## 2019-04-08 PROCEDURE — 25010000002 CYCLOPHOSPHAMIDE PER 100 MG: Performed by: INTERNAL MEDICINE

## 2019-04-08 PROCEDURE — 25010000002 RITUXIMAB 10 MG/ML SOLUTION 10 ML VIAL: Performed by: INTERNAL MEDICINE

## 2019-04-08 PROCEDURE — 80053 COMPREHEN METABOLIC PANEL: CPT | Performed by: INTERNAL MEDICINE

## 2019-04-08 RX ORDER — SODIUM CHLORIDE 9 MG/ML
250 INJECTION, SOLUTION INTRAVENOUS ONCE
Status: COMPLETED | OUTPATIENT
Start: 2019-04-08 | End: 2019-04-08

## 2019-04-08 RX ORDER — ACETAMINOPHEN 325 MG/1
650 TABLET ORAL ONCE
Status: CANCELLED | OUTPATIENT
Start: 2019-04-08

## 2019-04-08 RX ORDER — MEPERIDINE HYDROCHLORIDE 50 MG/ML
25 INJECTION INTRAMUSCULAR; INTRAVENOUS; SUBCUTANEOUS
Status: CANCELLED | OUTPATIENT
Start: 2019-04-08 | End: 2019-04-09

## 2019-04-08 RX ORDER — PREDNISONE 50 MG/1
50 TABLET ORAL DAILY
COMMUNITY
End: 2019-08-17

## 2019-04-08 RX ORDER — DIPHENHYDRAMINE HYDROCHLORIDE 50 MG/ML
50 INJECTION INTRAMUSCULAR; INTRAVENOUS AS NEEDED
Status: CANCELLED | OUTPATIENT
Start: 2019-04-08

## 2019-04-08 RX ORDER — DOXORUBICIN HYDROCHLORIDE 2 MG/ML
50 INJECTION, SOLUTION INTRAVENOUS ONCE
Status: CANCELLED | OUTPATIENT
Start: 2019-04-08

## 2019-04-08 RX ORDER — PALONOSETRON 0.05 MG/ML
0.25 INJECTION, SOLUTION INTRAVENOUS ONCE
Status: COMPLETED | OUTPATIENT
Start: 2019-04-08 | End: 2019-04-08

## 2019-04-08 RX ORDER — DOXORUBICIN HYDROCHLORIDE 2 MG/ML
90 INJECTION, SOLUTION INTRAVENOUS ONCE
Status: COMPLETED | OUTPATIENT
Start: 2019-04-08 | End: 2019-04-08

## 2019-04-08 RX ORDER — SODIUM CHLORIDE 0.9 % (FLUSH) 0.9 %
10 SYRINGE (ML) INJECTION AS NEEDED
Status: CANCELLED | OUTPATIENT
Start: 2019-04-08

## 2019-04-08 RX ORDER — PALONOSETRON 0.05 MG/ML
0.25 INJECTION, SOLUTION INTRAVENOUS ONCE
Status: CANCELLED | OUTPATIENT
Start: 2019-04-08

## 2019-04-08 RX ORDER — SODIUM CHLORIDE 0.9 % (FLUSH) 0.9 %
10 SYRINGE (ML) INJECTION AS NEEDED
Status: DISCONTINUED | OUTPATIENT
Start: 2019-04-08 | End: 2019-04-09 | Stop reason: HOSPADM

## 2019-04-08 RX ORDER — FAMOTIDINE 10 MG/ML
20 INJECTION, SOLUTION INTRAVENOUS AS NEEDED
Status: CANCELLED | OUTPATIENT
Start: 2019-04-08

## 2019-04-08 RX ORDER — SODIUM CHLORIDE 9 MG/ML
250 INJECTION, SOLUTION INTRAVENOUS ONCE
Status: CANCELLED | OUTPATIENT
Start: 2019-04-08

## 2019-04-08 RX ORDER — ACETAMINOPHEN 325 MG/1
650 TABLET ORAL ONCE
Status: COMPLETED | OUTPATIENT
Start: 2019-04-08 | End: 2019-04-08

## 2019-04-08 RX ADMIN — CYCLOPHOSPHAMIDE 1320 MG: 1 INJECTION, POWDER, FOR SOLUTION INTRAVENOUS; ORAL at 13:48

## 2019-04-08 RX ADMIN — DEXAMETHASONE SODIUM PHOSPHATE 12 MG: 4 INJECTION, SOLUTION INTRAMUSCULAR; INTRAVENOUS at 13:00

## 2019-04-08 RX ADMIN — PEGFILGRASTIM 6 MG: KIT SUBCUTANEOUS at 14:24

## 2019-04-08 RX ADMIN — PALONOSETRON HYDROCHLORIDE 0.25 MG: 0.25 INJECTION, SOLUTION INTRAVENOUS at 12:59

## 2019-04-08 RX ADMIN — ACETAMINOPHEN 650 MG: 325 TABLET, FILM COATED ORAL at 09:09

## 2019-04-08 RX ADMIN — DOXORUBICIN HYDROCHLORIDE 90 MG: 2 INJECTION, SOLUTION INTRAVENOUS at 13:23

## 2019-04-08 RX ADMIN — DIPHENHYDRAMINE HYDROCHLORIDE 50 MG: 50 INJECTION INTRAMUSCULAR; INTRAVENOUS at 09:10

## 2019-04-08 RX ADMIN — SODIUM CHLORIDE 250 ML: 9 INJECTION, SOLUTION INTRAVENOUS at 09:41

## 2019-04-08 RX ADMIN — VINCRISTINE SULFATE 2 MG: 1 INJECTION, SOLUTION INTRAVENOUS at 13:32

## 2019-04-08 RX ADMIN — SODIUM CHLORIDE 150 MG: 9 INJECTION, SOLUTION INTRAVENOUS at 12:59

## 2019-04-08 RX ADMIN — SODIUM CHLORIDE, PRESERVATIVE FREE 10 ML: 5 INJECTION INTRAVENOUS at 14:24

## 2019-04-08 RX ADMIN — RITUXIMAB 600 MG: 10 INJECTION, SOLUTION INTRAVENOUS at 09:41

## 2019-04-08 RX ADMIN — HEPARIN 500 UNITS: 100 SYRINGE at 14:24

## 2019-04-08 NOTE — PROGRESS NOTES
SW met with pt and her  during her first infusion to provide support and resources, and to address distress screening score of 9.  Pt states that she has been very anxious leading up to her first chemo treatment and that she did not sleep well last night.  She reports feeling better today, though and feels that everything has gone as well as can be expected.  SW provided support and informed her of the role of oncology social work.  Pt is concerned about her medical bills.  SW provided a [facility] financial assistance packet to pt to complete and return.  SW available for ongoing support and resource needs.

## 2019-04-08 NOTE — PLAN OF CARE
[facility] • [address]  • [address] • [place] [id number] • [phone]      CHEMOTHERAPY EDUCATION SHEET    NAME:  Lanette Simpson      : 1950           DATE: 19    Booklets Given: Chemotherapy and You []  Eating Hints []    Sexuality/Fertility Books []     Chemotherapy/Biotherapy Education Sheets: (list all that apply)  Rituximab, Cyclophosphamide, Doxorubicin, Vincristine, Prednisone                                                                                                                                                                Chemotherapy Regimen:  Rituximab, Cyclophosphamide, Doxorubicin, and Vincristine on day 1. Prednisone on days 1 through 5. (cycle 1 day 1; 21-day cycle; 19)    TOPICS EDUCATION PROVIDED EDUCATION REINFORCED COMMENTS   ANEMIA:  role of RBC, cause, s/s, ways to manage, role of transfusion [] [x] Educated patient on possible fatigue.     THROMBOCYTOPENIA:  role of platelet, cause, s/s, ways to prevent bleeding, things to avoid, when to seek help [] [x] Educated patient on decreased platelet counts and risk of bleeding.     NEUTROPENIA:  role of WBC, cause, infection precautions, s/s of infection, when to call MD [] [x] Educated patient on increased risk of infection and methods to prevent infection     NUTRITION & APPETITE CHANGES:  importance of maintaining healthy diet & weight, ways to manage to improve intake, dietary consult, exercise regimen [] []    DIARRHEA:  causes, s/s of dehydration, ways to manage, dietary changes, when to call MD [] []    CONSTIPATION:  causes, ways to manage, dietary changes, when to call MD [] [x] Encouraged patient to continue using stool softener   NAUSEA & VOMITING:  cause, use of antiemetics, dietary changes, when to call MD [] [x] Encouraged patient to use zofran if needed.     MOUTH SORES:  causes, oral care, ways to manage [] [x] Taught patient to mix soda/salt water mixture to prevent mouth sores     ALOPECIA:   cause, ways to manage, resources [] [x] Informed patient that some hair thinning or loss is possible   INFERTILITY & SEXUALITY:  causes, fertility preservation options, sexuality changes, ways to manage, importance of birth control [] []    NERVOUS SYSTEM CHANGES:  causes, s/s, neuropathies, cognitive changes, ways to manage [] [x] Informed patient of potential new peripheral neuropathies.   PAIN:  causes, ways to manage [] [] ????   SKIN & NAIL CHANGES:  cause, s/s, ways to manage [] [x] Informed patient of potential darkening of skin and nails   ORGAN TOXICITIES:  cause, s/s, need for diagnostic tests, labs, when to notify MD [] [x] Discussed cardiotoxicity related with doxorubicin and signs and symptoms that heart failure is occurring.   SURVIVORSHIP:  distress, distress assessment, secondary malignancies, early/late effects, follow-up, social issues, social support [] []    HOME CARE:  use of spill kits, storing of PO chemo, how to manage bodily fluids [] []    MISCELLANEOUS:  drug interactions, administration, vesicant, et [] []      Referrals:        Notes:   Educated patient and  on Rituximab, Cyclophosphamide, Doxorubicin, Vincristine . Patient was drowsy from administration of diphenhydramine. Patient was confused as to when she should take her prednisone and I instructed her to go ahead and take along with a snack while she was sitting here. Patient followed conversation and asked questions. Patient was given a calendar and pharmacy business card. Patient has no further questions at this time.    Juan Carlos Prescott, PharmD candidate

## 2019-04-08 NOTE — PROGRESS NOTES
Oncology Nutrition Screening    Patient Name:  Lanette Simpson  YOB: 1950  MRN: [id number]  Date:  04/08/19  Physician:  Dr. Zuluaga    Type of Cancer Treatment:   Chemotherapy: R-CHOP - every 21 days x 6 cycles    Patient Active Problem List   Diagnosis   • Ocular histoplasmosis   • GERD (gastroesophageal reflux disease)   • Varicose vein of leg   • Urinary frequency   • Diffuse large B-cell lymphoma of lymph nodes of head (CMS/HCC)       Current Outpatient Medications   Medication Sig Dispense Refill   • CALCIUM PO Take 600 mg by mouth Daily.     • lidocaine-prilocaine (EMLA) 2.5-2.5 % cream Apply  topically to the appropriate area as directed As Needed (45-60 minutes prior to port access.  Cover with saran/plastic wrap.). (Patient taking differently: Apply 1 application topically to the appropriate area as directed As Needed (45-60 minutes prior to port access.  Cover with saran/plastic wrap.).) 30 g 3   • meloxicam (MOBIC) 7.5 MG tablet TAKE ONE TABLET BY MOUTH DAILY 30 tablet 0   • Multiple Vitamins-Minerals (MULTIVITAMIN ADULT PO) Take 1 tablet by mouth Daily.     • ondansetron (ZOFRAN) 8 MG tablet Take 1 tablet by mouth 3 (Three) Times a Day As Needed for Nausea or Vomiting. 30 tablet 5   • oxybutynin XL (DITROPAN-XL) 10 MG 24 hr tablet TAKE 1 TABLET EVERY DAY 90 tablet 1   • predniSONE (DELTASONE) 50 MG tablet Take 50 mg by mouth Daily. As directed.       No current facility-administered medications for this visit.      Facility-Administered Medications Ordered in Other Visits   Medication Dose Route Frequency Provider Last Rate Last Dose   • cyclophosphamide (CYTOXAN) 1,320 mg in sodium chloride 0.9 % 341 mL chemo IVPB  750 mg/m2 (Treatment Plan Recorded) Intravenous Once Joan Zuluaga MD       • dexamethasone (DECADRON) 12 mg in sodium chloride 0.9 % IVPB  12 mg Intravenous Once Joan Zuluaga MD       • DOXOrubicin (ADRIAMYCIN) chemo injection 90 mg  90 mg Intravenous Once Margareth  "Joan BURGESS MD       • FOSAPREPITANT IVPB (HILARIO ONC) IVPB 150 mg 100 mL  150 mg Intravenous Once Joan Zuluaga MD       • palonosetron (ALOXI) injection 0.25 mg  0.25 mg Intravenous Once Joan Zuluaga MD       • pegfilgrastim (NEULASTA ONPRO) on-body injector 6 mg  6 mg Subcutaneous Once Joan Zuluaga MD       • vinCRIStine (ONCOVIN) 2 mg in sodium chloride 0.9 % 57 mL chemo IVPB  2 mg Intravenous Once Joan Zuluaga MD           Glycemic Risk:   Steroids - prednisone 50 mg/day; no previous hx of glycemic risk    Weight:   Height: 63\"  Weight: 160 lbs  Usual Body Weight: ~170 lbs.   BMI: 28.34 kg/m²  Overweight  Weight has decreased ~10 pounds over last year  5.9% weight loss    Oral Food Intake:  Regular Diet - No Restrictions    Hydration Status:   How many 8 ounce glass of water of fluid do you drink per day?  Specific amount not addressed at this time. Patient states she mostly drinks water during the day at home.    Enteral Feeding:   n/a    Nutrition Symptoms:   Problems Chewing/Swallowing    Activity:   Not my normal self, but able to be up and about with fairly normal activities.      reports that she has never smoked. She has never used smokeless tobacco. She reports that she does not drink alcohol or use drugs.    Evaluation of Nutritional Risk:   Patient identified to be at nutritional risk due to diagnosis and treatment plan. Met with patient during initial chemotherapy infusion appointment. Patient reports some issues with chewing/biting but still eating regular textures as she is able. Mostly chewing on the sides of mouth and cutting up foods as necessary. She reports her appetite has been fair. Likes vegetables, fruits, cottage cheese, and salads. Eats chicken and fish but does not eat a lot of meat in general. Denies any other nutritional symptoms. Reports she has noticed an estimated 20 lb weight loss over the past year. Attributes this to starting a new part-time job, and generally being more " active.    Discussed the importance of good nutrition during treatment course focusing on adequate calorie, protein, nutrient and fluid intake.  Advised patient to be consuming smaller more frequent meals/snacks throughout the day to aid with potential nausea management.  Emphasized the importance of protein and its role in the diet; reviewed high protein foods; and recommended having a protein source at each meal/snack. Patient reports she has nutrition supplements at home. She reports she does not drink those regularly. Encouraged patient to utilize nutrition supplements if her appetite declines, or in between meals if she feels she is eating less than her normal appetite. Also emphasized the importance of hydration; reviewed good hydrating fluid options; and recommended to drink at least 64 ounces daily.      Answered patient and husbands questions, patient voiced understanding of information discussed.  RD's contact information provided along with coupons for nutrition supplements and encouraged patient/ to call with questions.  Will monitor as needed during treatment course.    Electronically signed by:  Michelle Middleton RD  10:49 AM

## 2019-04-08 NOTE — PROGRESS NOTES
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  Staging workup revealed stage IIE disease.  Patient was started on definitive chemotherapy with R CHOP April 8, 2019. The  patient is here today for cycle #1    SUBJECTIVE: The patient has been doing fairly well. she was able to tolerate  her bone marrow biopsy as well as port placement without any immediate complications. she denied any fever or  chills, no night sweats, denied any headaches    PAST MEDICAL HISTORY/SOCIAL HISTORY/FAMILY HISTORY: Reviewed by me and unchanged from my documentation done on 04/08/19.    Review of Systems   Constitutional: Negative for activity change, appetite change, chills, fatigue, fever and unexpected weight change.   HENT: Negative for hearing loss, mouth sores, nosebleeds, sore throat and trouble swallowing.    Eyes: Negative for visual disturbance.   Respiratory: Negative for cough, chest tightness, shortness of breath and wheezing.    Cardiovascular: Negative for chest pain, palpitations and leg swelling.   Gastrointestinal: Negative for abdominal distention, abdominal pain, blood in stool, constipation, diarrhea, nausea, rectal pain and vomiting.   Endocrine: Negative for cold intolerance and heat intolerance.   Genitourinary: Negative for difficulty urinating, dysuria, frequency and urgency.   Musculoskeletal: Negative for arthralgias, back pain, gait problem, joint swelling and myalgias.   Skin: Negative for rash.   Neurological: Negative for dizziness, tremors, syncope, weakness, light-headedness, numbness and headaches.   Hematological: Negative for adenopathy. Does not bruise/bleed easily.   Psychiatric/Behavioral: Negative for confusion, sleep disturbance and suicidal ideas. The patient is  "nervous/anxious.          Current Outpatient Medications:   •  predniSONE (DELTASONE) 50 MG tablet, Take 50 mg by mouth Daily. As directed., Disp: , Rfl:   •  CALCIUM PO, Take 600 mg by mouth Daily., Disp: , Rfl:   •  lidocaine-prilocaine (EMLA) 2.5-2.5 % cream, Apply  topically to the appropriate area as directed As Needed (45-60 minutes prior to port access.  Cover with saran/plastic wrap.). (Patient taking differently: Apply 1 application topically to the appropriate area as directed As Needed (45-60 minutes prior to port access.  Cover with saran/plastic wrap.).), Disp: 30 g, Rfl: 3  •  meloxicam (MOBIC) 7.5 MG tablet, TAKE ONE TABLET BY MOUTH DAILY, Disp: 30 tablet, Rfl: 0  •  Multiple Vitamins-Minerals (MULTIVITAMIN ADULT PO), Take 1 tablet by mouth Daily., Disp: , Rfl:   •  ondansetron (ZOFRAN) 8 MG tablet, Take 1 tablet by mouth 3 (Three) Times a Day As Needed for Nausea or Vomiting., Disp: 30 tablet, Rfl: 5  •  oxybutynin XL (DITROPAN-XL) 10 MG 24 hr tablet, TAKE 1 TABLET EVERY DAY, Disp: 90 tablet, Rfl: 1    PHYSICAL EXAMINATION:   /73   Pulse 69   Temp 97.5 °F (36.4 °C) (Temporal)   Resp 12   Ht 160 cm (63\")   Wt 72.6 kg (160 lb)   SpO2 98%   BMI 28.34 kg/m²    ECOG Performance Status: 1 - Symptomatic but completely ambulatory  General Appearance:  alert, cooperative, no apparent distress and appears stated age   Neurologic/Psychiatric: A&O x 3, gait steady, appropriate affect, strength 5/5 in all muscle groups   HEENT:  Normocephalic, without obvious abnormality, mucous membranes moist   Neck: Supple, symmetrical, trachea midline, no adenopathy;  No thyromegaly, masses, or tenderness   Lungs:   Clear to auscultation bilaterally; respirations regular, even, and unlabored bilaterally   Heart:  Regular rate and rhythm, no murmurs appreciated   Abdomen:   Soft, non-tender, non-distended and no organomegaly   Lymph nodes: No cervical, supraclavicular, inguinal or axillary adenopathy noted "   Extremities: Normal, atraumatic; no clubbing, cyanosis, or edema    Skin: No rashes, ulcers, or suspicious lesions noted     Hospital Outpatient Visit on 03/28/2019   Component Date Value Ref Range Status   • BSA 03/28/2019 1.8  m^2 Final   • BH CV ECHO BERENICE - RVDD 03/28/2019 2.4  cm Final   • IVSd 03/28/2019 1.2  cm Final   • LVIDd 03/28/2019 4.2  cm Final   • LVIDs 03/28/2019 2.9  cm Final   • LVPWd 03/28/2019 1.3  cm Final   • IVS/LVPW 03/28/2019 0.94   Final   • FS 03/28/2019 31.8  % Final   • EDV(Teich) 03/28/2019 78.8  ml Final   • ESV(Teich) 03/28/2019 31.3  ml Final   • EF(Teich) 03/28/2019 60.3  % Final   • EDV(cubed) 03/28/2019 74.4  ml Final   • ESV(cubed) 03/28/2019 23.6  ml Final   • EF(cubed) 03/28/2019 68.3  % Final   • LV mass(C)d 03/28/2019 186.1  grams Final   • LV mass(C)dI 03/28/2019 105.3  grams/m^2 Final   • SV(Teich) 03/28/2019 47.5  ml Final   • SI(Teich) 03/28/2019 26.9  ml/m^2 Final   • SV(cubed) 03/28/2019 50.8  ml Final   • SI(cubed) 03/28/2019 28.8  ml/m^2 Final   • Ao root diam 03/28/2019 3.2  cm Final   • Ao root area 03/28/2019 8.3  cm^2 Final   • LA dimension 03/28/2019 3.6  cm Final   • LA/Ao 03/28/2019 1.1   Final   • LVOT diam 03/28/2019 2.0  cm Final   • LVOT area 03/28/2019 3.0  cm^2 Final   • LVOT area(traced) 03/28/2019 3.1  cm^2 Final   • LAd major 03/28/2019 5.5  cm Final   • LVLd ap4 03/28/2019 8.3  cm Final   • EDV(MOD-sp4) 03/28/2019 108.0  ml Final   • LVLs ap4 03/28/2019 6.5  cm Final   • ESV(MOD-sp4) 03/28/2019 40.0  ml Final   • EF(MOD-sp4) 03/28/2019 63.0  % Final   • LVLd ap2 03/28/2019 8.2  cm Final   • EDV(MOD-sp2) 03/28/2019 79.0  ml Final   • LVLs ap2 03/28/2019 6.0  cm Final   • ESV(MOD-sp2) 03/28/2019 26.0  ml Final   • EF(MOD-sp2) 03/28/2019 67.1  % Final   • LA volume 03/28/2019 50.0  ml Final   • EF(MOD-bp) 03/28/2019 65.0  % Final   • SV(MOD-sp4) 03/28/2019 68.0  ml Final   • SI(MOD-sp4) 03/28/2019 38.5  ml/m^2 Final   • SV(MOD-sp2) 03/28/2019 53.0  ml  Final   • SI(MOD-sp2) 03/28/2019 30.0  ml/m^2 Final   • Ao root area (BSA corrected) 03/28/2019 1.8   Final   • LV Zapata Vol (BSA corrected) 03/28/2019 61.1  ml/m^2 Final   • LV Sys Vol (BSA corrected) 03/28/2019 22.6  ml/m^2 Final   • LA Volume Index 03/28/2019 28.3  ml/m^2 Final   • MV E max jamir 03/28/2019 58.2  cm/sec Final   • MV A max jamir 03/28/2019 93.3  cm/sec Final   • MV E/A 03/28/2019 0.62   Final   • MV dec time 03/28/2019 0.19  sec Final   • LV V1 max PG 03/28/2019 3.7  mmHg Final   • LV V1 mean PG 03/28/2019 1.5  mmHg Final   • LV V1 max 03/28/2019 95.8  cm/sec Final   • LV V1 mean 03/28/2019 54.6  cm/sec Final   • LV V1 VTI 03/28/2019 22.5  cm Final   • SV(LVOT) 03/28/2019 67.3  ml Final   • SI(LVOT) 03/28/2019 38.1  ml/m^2 Final   • PA acc slope 03/28/2019 1,225  cm/sec^2 Final   • PA acc time 03/28/2019 0.07  sec Final   • TR max jamir 03/28/2019 202.2  cm/sec Final   • BH CV ECHO BERENICE - TR MAX PG 03/28/2019 17.0  mmHg Final   • RVSP(TR) 03/28/2019 25.0  mmHg Final   • RAP systole 03/28/2019 8.0  mmHg Final   • PA pr(Accel) 03/28/2019 46.9  mmHg Final   • Lat E/e'  03/28/2019 6.8   Final   • Med E/e' 03/28/2019 7.0   Final   • Lat Peak E' Jamir 03/28/2019 8.3  cm/sec Final   • Med Peak E' Jamir 03/28/2019 8.1  cm/sec Final   • BH CV ECHO BERENICE - BZI_BMI 03/28/2019 28.7  kilograms/m^2 Final   • BH CV ECHO BERENICE - BSA([place]) 03/28/2019 1.8  m^2 Final   • BH CV ECHO BERENICE - BZI_METRIC_WEIGHT 03/28/2019 73.5  kg Final   • BH CV ECHO BERENICE - BZI_METRIC_HEIGHT 03/28/2019 160.0  cm Final   • Avg E/e' ratio 03/28/2019 7.10   Final   • Target HR (85%) 03/28/2019 128  bpm Final   • Max. Pred. HR (100%) 03/28/2019 151  bpm Final   • BH CV VAS BP LEFT ARM 03/28/2019 139/73  mmHg Final   • TDI S' 03/28/2019 17.70  cm/sec Final   • RV Base 03/28/2019 3.80  cm Final   • RV Length 03/28/2019 5.20  cm Final   • RV Mid 03/28/2019 3.00  cm Final   • TAPSE (>1.6) 03/28/2019 2.50  cm2 Final   Hospital Outpatient Visit on  03/28/2019   Component Date Value Ref Range Status   • Glucose 03/28/2019 80  70 - 130 mg/dL Final   Appointment on 03/27/2019   Component Date Value Ref Range Status   • Protime 03/27/2019 13.6  11.2 - 14.3 Seconds Final   • INR 03/27/2019 1.09  0.85 - 1.16 Final        Xr Chest 1 View    Result Date: 4/4/2019  Narrative: EXAMINATION: XR CHEST 1 VW- 04/04/2019  INDICATION: Lymphoma, port placement  COMPARISON: NONE  FINDINGS: Cardiac size within normal limits. Pulmonary vascularity within normal limits. No focal opacification or consolidation. No pneumothorax or significant effusion. Degenerative changes of the spine. Right chest wall Port-A-Cath terminates within the distal SVC. No postprocedural pneumothorax.         Impression: Right chest wall Port-A-Cath placement of a subclavian approach terminates within the distal SVC without postprocedural pneumothorax.  D:  04/04/2019 E:  04/04/2019  This report was finalized on 4/4/2019 12:55 PM by Dr. John Maddox.      Fl C Arm During Surgery    Result Date: 4/4/2019  Narrative: EXAMINATION: FL C ARM DURING SURGERY- 04/04/2019  INDICATION: Port Placement  TECHNIQUE: Intraoperative fluoroscopy for improved localization and treatment planning  COMPARISON: NONE  FINDINGS: Intraoperative fluoroscopy with total fluoroscopic time usage 7 seconds and no representative images saved during port placement.      Impression: Intraoperative fluoroscopy utilized during port placement.  D:  04/04/2019 E:  04/04/2019    This report was finalized on 4/4/2019 12:48 PM by Dr. John Maddox.      Nm Pet Skull Base To Mid Thigh    Result Date: 3/28/2019  Narrative: EXAMINATION: NM PET, SKULL BASE TO MID THIGH-03/28/2019:  INDICATION: Complete cancer staging; Z51.11-Encounter for antineoplastic chemotherapy; C83.31-Diffuse large B-cell lymphoma, lymph nodes of head, face, and neck.  TECHNIQUE: Fasting blood glucose 80 mg/dL. Radiopharmaceutical: 11.5 mCi F-18 FDG. The patient was imaged  after an appropriate amount of time. CT datasets performed for fusion analysis alone and should not be used for diagnostic purposes as these are low-dose protocol.  The radiation dose reduction device was turned on as low as reasonably achievable for each scan per ALARA protocol.  COMPARISON: NONE.  FINDINGS:  HEAD AND NECK: Grossly symmetric appearance of the cerebral hemispheres on limited evaluation. Abnormal hypermetabolism and irregularity of the right hemimandible without significant soft tissue hypermetabolism associated. Mildly enlarged and low-level activity associated level 2 and 3 lymph nodes within the jugular cervical chains. No abnormal hypermetabolism within the head and neck otherwise noted. Specifically, no bulky cervical adenopathy or associated hypermetabolism within the cervical chain lymph nodes.  CHEST: Physiologic activity within the left ventricular myocardium otherwise, no abnormal hypermetabolism within the chest.  ABDOMEN AND PELVIS: Physiologic activity within the liver, spleen, renal collecting systems and GI tract without abnormal hypermetabolism otherwise noted. Osseous structures and proximal thighs unremarkable for abnormal hypermetabolism or pathology.      Impression: Abnormal hypermetabolism and irregularity of the right hemimandible without significant soft tissue hypermetabolism associated. Mildly enlarged and low-level activity associated level 2 and 3 lymph nodes within the jugular cervical chains otherwise, no abnormal hypermetabolism or bulky adenopathy throughout the remainder.   D:  03/28/2019 E:  03/28/2019  This report was finalized on 3/28/2019 4:05 PM by Dr. John Maddox.        ASSESSMENT: The patient is a very pleasant 68 y.o. female  with mandible diffuse large B-cell lymphoma.    PROBLEM LIST:  1. Mandible diffuse large B-cell lymphoma, stage IIE:  A.  Presented with gum pain and swelling  B.  CT facial bones done on February 26, 2019 revealed 2.3 cm lucency in the  anterior mandible  C.  Status post biopsy done on March 7, 2019 consistent with diffuse large B-cell lymphoma  D.  Bone marrow biopsy done on March 25, 2019 no evidence of lymphoma involvement.  E.  Whole body PET scan done on March 28, 2019 revealed hypermetabolic activity in the right curtis-mandible as well as jugular cervical chains hypermetabolic lymphadenopathy level 2 and level 3.  2.  Osteoarthritis  3.  Chemotherapy-induced nausea  4.  Deep venous catheter    PLAN:  1.  I will proceed with chemotherapy as scheduled today R CHOP cycle #1.  2.  The patient will follow up with us in 3 weeks cycle #2 out of 6 planned.  3.  I will monitor the patient blood work including blood counts kidney function liver function and electrolytes.  4.  I will repeat the patient scans prior to cycle #4.  5.  We discussed the potential risks and side effects of R CHOP chemotherapy including neutropenia, alopecia, nausea and vomiting, fatigue, neuropathy, cardiomyopathy, constipation, infusion reaction, and a small risk of myelodysplasia.  6.  I will start the patient on Zofran as needed for chemotherapy-induced nausea.  7.  We will continue port care.  Patient was given prescription for EMLA cream.  The patient would be at a higher risk of catheter related infections.  8.  We will continue Mobic as needed for osteoarthritis.  Patient would be at high risk of bleeding since she might develop thrombocytopenia while she is on chemotherapy.  9.  I did go over the PET scan results, reviewed the films myself, went over the pictures with the patient and her , I explained the patient her disease is in the mandible as well as in the neck only and hence her stage would be stage IIE.  10.  I did go over the bone marrow biopsy result and reassured the patient there was no evidence of lymphoma involvement.  Joan Zuluaga MD  4/8/2019

## 2019-04-18 ENCOUNTER — TELEPHONE (OUTPATIENT)
Dept: ONCOLOGY | Facility: CLINIC | Age: 69
End: 2019-04-18

## 2019-04-18 RX ORDER — OMEPRAZOLE 20 MG/1
20 CAPSULE, DELAYED RELEASE ORAL DAILY
Qty: 90 CAPSULE | Refills: 3 | Status: SHIPPED | OUTPATIENT
Start: 2019-04-18 | End: 2020-06-02

## 2019-04-18 NOTE — TELEPHONE ENCOUNTER
Patient called triage line to report that she is experiencing indigestion and would like to know what she can take to help relieve the symptoms.  Discussed with NP and received a verbal for omeprazole 20 mg daily.  Educated patient on medication and she verbalized understanding.

## 2019-04-29 ENCOUNTER — HOSPITAL ENCOUNTER (OUTPATIENT)
Dept: ONCOLOGY | Facility: HOSPITAL | Age: 69
Setting detail: INFUSION SERIES
Discharge: HOME | End: 2019-04-29

## 2019-04-29 ENCOUNTER — OFFICE VISIT (OUTPATIENT)
Dept: ONCOLOGY | Facility: CLINIC | Age: 69
End: 2019-04-29

## 2019-04-29 ENCOUNTER — DOCUMENTATION (OUTPATIENT)
Dept: NUTRITION | Facility: HOSPITAL | Age: 69
End: 2019-04-29

## 2019-04-29 VITALS
BODY MASS INDEX: 28.33 KG/M2 | SYSTOLIC BLOOD PRESSURE: 118 MMHG | HEART RATE: 73 BPM | WEIGHT: 159.9 LBS | RESPIRATION RATE: 16 BRPM | DIASTOLIC BLOOD PRESSURE: 78 MMHG | TEMPERATURE: 97.8 F | HEIGHT: 63 IN | OXYGEN SATURATION: 98 %

## 2019-04-29 VITALS
RESPIRATION RATE: 16 BRPM | SYSTOLIC BLOOD PRESSURE: 136 MMHG | DIASTOLIC BLOOD PRESSURE: 55 MMHG | HEART RATE: 91 BPM | TEMPERATURE: 97.4 F

## 2019-04-29 DIAGNOSIS — C83.31 DIFFUSE LARGE B-CELL LYMPHOMA OF LYMPH NODES OF HEAD (HCC): Primary | ICD-10-CM

## 2019-04-29 LAB
ALBUMIN SERPL-MCNC: 3.9 G/DL (ref 3.5–5.2)
ALBUMIN/GLOB SERPL: 1.3 G/DL
ALP SERPL-CCNC: 58 U/L (ref 39–117)
ALT SERPL W P-5'-P-CCNC: 17 U/L (ref 1–33)
ANION GAP SERPL CALCULATED.3IONS-SCNC: 11 MMOL/L
AST SERPL-CCNC: 20 U/L (ref 1–32)
BILIRUB SERPL-MCNC: 0.4 MG/DL (ref 0.2–1.2)
BUN BLD-MCNC: 17 MG/DL (ref 8–23)
BUN/CREAT SERPL: 24.6 (ref 7–25)
CALCIUM SPEC-SCNC: 9.3 MG/DL (ref 8.6–10.5)
CHLORIDE SERPL-SCNC: 107 MMOL/L (ref 98–107)
CO2 SERPL-SCNC: 24 MMOL/L (ref 22–29)
CREAT BLD-MCNC: 0.69 MG/DL (ref 0.57–1)
ERYTHROCYTE [DISTWIDTH] IN BLOOD BY AUTOMATED COUNT: 14.5 % (ref 12.3–15.4)
GFR SERPL CREATININE-BSD FRML MDRD: 84 ML/MIN/1.73
GLOBULIN UR ELPH-MCNC: 3.1 GM/DL
GLUCOSE BLD-MCNC: 86 MG/DL (ref 65–99)
HCT VFR BLD AUTO: 34.9 % (ref 34–46.6)
HGB BLD-MCNC: 12 G/DL (ref 12–15.9)
LYMPHOCYTES # BLD AUTO: 1.3 10*3/MM3 (ref 0.7–3.1)
LYMPHOCYTES NFR BLD AUTO: 23.1 % (ref 19.6–45.3)
MCH RBC QN AUTO: 31.1 PG (ref 26.6–33)
MCHC RBC AUTO-ENTMCNC: 34.4 G/DL (ref 31.5–35.7)
MCV RBC AUTO: 90.4 FL (ref 79–97)
MONOCYTES # BLD AUTO: 0.5 10*3/MM3 (ref 0.1–0.9)
MONOCYTES NFR BLD AUTO: 9 % (ref 5–12)
NEUTROPHILS # BLD AUTO: 3.9 10*3/MM3 (ref 1.7–7)
NEUTROPHILS NFR BLD AUTO: 67.9 % (ref 42.7–76)
PLATELET # BLD AUTO: 458 10*3/MM3 (ref 140–450)
PMV BLD AUTO: 7.6 FL (ref 6–12)
POTASSIUM BLD-SCNC: 4 MMOL/L (ref 3.5–5.2)
PROT SERPL-MCNC: 7 G/DL (ref 6–8.5)
RBC # BLD AUTO: 3.86 10*6/MM3 (ref 3.77–5.28)
SODIUM BLD-SCNC: 142 MMOL/L (ref 136–145)
WBC NRBC COR # BLD: 5.8 10*3/MM3 (ref 3.4–10.8)

## 2019-04-29 PROCEDURE — 96375 TX/PRO/DX INJ NEW DRUG ADDON: CPT

## 2019-04-29 PROCEDURE — 25010000002 RITUXIMAB 10 MG/ML SOLUTION 10 ML VIAL: Performed by: NURSE PRACTITIONER

## 2019-04-29 PROCEDURE — 25010000002 PALONOSETRON PER 25 MCG: Performed by: NURSE PRACTITIONER

## 2019-04-29 PROCEDURE — 85025 COMPLETE CBC W/AUTO DIFF WBC: CPT | Performed by: NURSE PRACTITIONER

## 2019-04-29 PROCEDURE — 80053 COMPREHEN METABOLIC PANEL: CPT | Performed by: NURSE PRACTITIONER

## 2019-04-29 PROCEDURE — 96415 CHEMO IV INFUSION ADDL HR: CPT

## 2019-04-29 PROCEDURE — 25010000002 DEXAMETHASONE PER 1 MG: Performed by: NURSE PRACTITIONER

## 2019-04-29 PROCEDURE — 96377 APPLICATON ON-BODY INJECTOR: CPT

## 2019-04-29 PROCEDURE — 25010000002 VINCRISTINE PER 1 MG: Performed by: NURSE PRACTITIONER

## 2019-04-29 PROCEDURE — 96367 TX/PROPH/DG ADDL SEQ IV INF: CPT

## 2019-04-29 PROCEDURE — 25010000002 PEGFILGRASTIM 6 MG/0.6ML PREFILLED SYRINGE KIT: Performed by: NURSE PRACTITIONER

## 2019-04-29 PROCEDURE — 96368 THER/DIAG CONCURRENT INF: CPT

## 2019-04-29 PROCEDURE — 96413 CHEMO IV INFUSION 1 HR: CPT

## 2019-04-29 PROCEDURE — 25010000002 RITUXIMAB 500 MG/50ML SOLUTION 50 ML VIAL: Performed by: NURSE PRACTITIONER

## 2019-04-29 PROCEDURE — 99214 OFFICE O/P EST MOD 30 MIN: CPT | Performed by: NURSE PRACTITIONER

## 2019-04-29 PROCEDURE — 25010000002 DIPHENHYDRAMINE PER 50 MG: Performed by: NURSE PRACTITIONER

## 2019-04-29 PROCEDURE — 96417 CHEMO IV INFUS EACH ADDL SEQ: CPT

## 2019-04-29 PROCEDURE — 25010000002 FOSAPREPITANT PER 1 MG: Performed by: NURSE PRACTITIONER

## 2019-04-29 PROCEDURE — 25010000002 DOXORUBICIN PER 10 MG: Performed by: NURSE PRACTITIONER

## 2019-04-29 PROCEDURE — 96411 CHEMO IV PUSH ADDL DRUG: CPT

## 2019-04-29 PROCEDURE — 25010000002 CYCLOPHOSPHAMIDE PER 100 MG: Performed by: NURSE PRACTITIONER

## 2019-04-29 RX ORDER — SODIUM CHLORIDE 9 MG/ML
250 INJECTION, SOLUTION INTRAVENOUS ONCE
Status: COMPLETED | OUTPATIENT
Start: 2019-04-29 | End: 2019-04-29

## 2019-04-29 RX ORDER — MEPERIDINE HYDROCHLORIDE 50 MG/ML
25 INJECTION INTRAMUSCULAR; INTRAVENOUS; SUBCUTANEOUS
Status: CANCELLED | OUTPATIENT
Start: 2019-04-29 | End: 2019-04-30

## 2019-04-29 RX ORDER — PALONOSETRON 0.05 MG/ML
0.25 INJECTION, SOLUTION INTRAVENOUS ONCE
Status: CANCELLED | OUTPATIENT
Start: 2019-04-29

## 2019-04-29 RX ORDER — SODIUM CHLORIDE 0.9 % (FLUSH) 0.9 %
10 SYRINGE (ML) INJECTION AS NEEDED
Status: DISCONTINUED | OUTPATIENT
Start: 2019-04-29 | End: 2019-04-30 | Stop reason: HOSPADM

## 2019-04-29 RX ORDER — ACETAMINOPHEN 325 MG/1
650 TABLET ORAL ONCE
Status: CANCELLED | OUTPATIENT
Start: 2019-04-29

## 2019-04-29 RX ORDER — DOXORUBICIN HYDROCHLORIDE 2 MG/ML
50 INJECTION, SOLUTION INTRAVENOUS ONCE
Status: CANCELLED | OUTPATIENT
Start: 2019-04-29

## 2019-04-29 RX ORDER — ACETAMINOPHEN 325 MG/1
650 TABLET ORAL ONCE
Status: COMPLETED | OUTPATIENT
Start: 2019-04-29 | End: 2019-04-29

## 2019-04-29 RX ORDER — SODIUM CHLORIDE 9 MG/ML
250 INJECTION, SOLUTION INTRAVENOUS ONCE
Status: CANCELLED | OUTPATIENT
Start: 2019-04-29

## 2019-04-29 RX ORDER — FAMOTIDINE 10 MG/ML
20 INJECTION, SOLUTION INTRAVENOUS AS NEEDED
Status: CANCELLED | OUTPATIENT
Start: 2019-04-29

## 2019-04-29 RX ORDER — DOXORUBICIN HYDROCHLORIDE 2 MG/ML
90 INJECTION, SOLUTION INTRAVENOUS ONCE
Status: COMPLETED | OUTPATIENT
Start: 2019-04-29 | End: 2019-04-29

## 2019-04-29 RX ORDER — SODIUM CHLORIDE 0.9 % (FLUSH) 0.9 %
10 SYRINGE (ML) INJECTION AS NEEDED
Status: CANCELLED | OUTPATIENT
Start: 2019-04-29

## 2019-04-29 RX ORDER — DIPHENHYDRAMINE HYDROCHLORIDE 50 MG/ML
50 INJECTION INTRAMUSCULAR; INTRAVENOUS AS NEEDED
Status: CANCELLED | OUTPATIENT
Start: 2019-04-29

## 2019-04-29 RX ORDER — PALONOSETRON 0.05 MG/ML
0.25 INJECTION, SOLUTION INTRAVENOUS ONCE
Status: COMPLETED | OUTPATIENT
Start: 2019-04-29 | End: 2019-04-29

## 2019-04-29 RX ADMIN — DEXAMETHASONE SODIUM PHOSPHATE 12 MG: 4 INJECTION, SOLUTION INTRAMUSCULAR; INTRAVENOUS at 13:06

## 2019-04-29 RX ADMIN — SODIUM CHLORIDE 250 ML: 9 INJECTION, SOLUTION INTRAVENOUS at 10:45

## 2019-04-29 RX ADMIN — PEGFILGRASTIM 6 MG: KIT SUBCUTANEOUS at 14:42

## 2019-04-29 RX ADMIN — DOXORUBICIN HYDROCHLORIDE 90 MG: 2 INJECTION, SOLUTION INTRAVENOUS at 13:38

## 2019-04-29 RX ADMIN — SODIUM CHLORIDE 150 MG: 9 INJECTION, SOLUTION INTRAVENOUS at 13:06

## 2019-04-29 RX ADMIN — DIPHENHYDRAMINE HYDROCHLORIDE 50 MG: 50 INJECTION INTRAMUSCULAR; INTRAVENOUS at 10:06

## 2019-04-29 RX ADMIN — PALONOSETRON HYDROCHLORIDE 0.25 MG: 0.25 INJECTION, SOLUTION INTRAVENOUS at 13:04

## 2019-04-29 RX ADMIN — VINCRISTINE SULFATE 2 MG: 1 INJECTION, SOLUTION INTRAVENOUS at 13:50

## 2019-04-29 RX ADMIN — CYCLOPHOSPHAMIDE 1320 MG: 1 INJECTION, POWDER, FOR SOLUTION INTRAVENOUS; ORAL at 14:08

## 2019-04-29 RX ADMIN — RITUXIMAB 600 MG: 10 INJECTION, SOLUTION INTRAVENOUS at 10:45

## 2019-04-29 RX ADMIN — ACETAMINOPHEN 650 MG: 325 TABLET ORAL at 10:06

## 2019-04-29 RX ADMIN — HEPARIN 500 UNITS: 100 SYRINGE at 14:44

## 2019-04-29 NOTE — PROGRESS NOTES
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  Staging workup revealed stage IIE disease.  Patient was started on definitive chemotherapy with R CHOP April 8, 2019. The patient is here today for cycle #2.    SUBJECTIVE: The patient is here today with her . She is feeling fairly well. She tolerated her first cycle of treatment with only mild side effects. She had some fatigue as well as some heartburn and indigestion. She was started on omeprazole which has helped significantly. She denies nausea or vomiting. She has had no fever or chills. She feels like the swelling in her neck and mouth has already begun to improve, which she is happy about.     PAST MEDICAL HISTORY/SOCIAL HISTORY/FAMILY HISTORY: Reviewed by me and unchanged from my documentation done on 04/29/19.    Review of Systems   Constitutional: Positive for fever. Negative for activity change, appetite change, chills, fatigue and unexpected weight change.   HENT: Negative for hearing loss, mouth sores, nosebleeds, sore throat and trouble swallowing.    Eyes: Negative for visual disturbance.   Respiratory: Negative for cough, chest tightness, shortness of breath and wheezing.    Cardiovascular: Negative for chest pain, palpitations and leg swelling.   Gastrointestinal: Negative for abdominal distention, abdominal pain, blood in stool, constipation, diarrhea, nausea, rectal pain and vomiting.        Indigestion/heartburn   Endocrine: Negative for cold intolerance and heat intolerance.   Genitourinary: Negative for difficulty urinating, dysuria, frequency and urgency.   Musculoskeletal: Negative for arthralgias, back pain, gait problem, joint swelling and myalgias.   Skin: Negative for rash.        alopecia   Neurological:  Negative for dizziness, tremors, syncope, weakness, light-headedness, numbness and headaches.   Hematological: Negative for adenopathy. Does not bruise/bleed easily.   Psychiatric/Behavioral: Negative for confusion, sleep disturbance and suicidal ideas. The patient is not nervous/anxious.          Current Outpatient Medications:   •  CALCIUM PO, Take 600 mg by mouth Daily., Disp: , Rfl:   •  lidocaine-prilocaine (EMLA) 2.5-2.5 % cream, Apply  topically to the appropriate area as directed As Needed (45-60 minutes prior to port access.  Cover with saran/plastic wrap.). (Patient taking differently: Apply 1 application topically to the appropriate area as directed As Needed (45-60 minutes prior to port access.  Cover with saran/plastic wrap.).), Disp: 30 g, Rfl: 3  •  meloxicam (MOBIC) 7.5 MG tablet, TAKE ONE TABLET BY MOUTH DAILY, Disp: 30 tablet, Rfl: 0  •  Multiple Vitamins-Minerals (MULTIVITAMIN ADULT PO), Take 1 tablet by mouth Daily., Disp: , Rfl:   •  omeprazole (priLOSEC) 20 MG capsule, Take 1 capsule by mouth Daily. At bedtime, Disp: 90 capsule, Rfl: 3  •  ondansetron (ZOFRAN) 8 MG tablet, Take 1 tablet by mouth 3 (Three) Times a Day As Needed for Nausea or Vomiting., Disp: 30 tablet, Rfl: 5  •  oxybutynin XL (DITROPAN-XL) 10 MG 24 hr tablet, TAKE 1 TABLET EVERY DAY, Disp: 90 tablet, Rfl: 1  •  predniSONE (DELTASONE) 50 MG tablet, Take 50 mg by mouth Daily. As directed., Disp: , Rfl:     PHYSICAL EXAMINATION:   There were no vitals taken for this visit.   ECOG Performance Status: 1 - Symptomatic but completely ambulatory  General Appearance:  alert, cooperative, no apparent distress and appears stated age   Neurologic/Psychiatric: A&O x 3, gait steady, appropriate affect, strength 5/5 in all muscle groups   HEENT:  Normocephalic, without obvious abnormality, mucous membranes moist   Neck: Supple, symmetrical, trachea midline, no adenopathy;  No thyromegaly, masses, or tenderness   Lungs:   Clear to auscultation  bilaterally; respirations regular, even, and unlabored bilaterally   Heart:  Regular rate and rhythm, no murmurs appreciated   Abdomen:   Soft, non-tender, non-distended and no organomegaly   Lymph nodes: No cervical, supraclavicular, inguinal or axillary adenopathy noted   Extremities: Normal, atraumatic; no clubbing, cyanosis, or edema    Skin: No rashes, ulcers, or suspicious lesions noted     No visits with results within 2 Week(s) from this visit.   Latest known visit with results is:   Hospital Outpatient Visit on 04/08/2019   Component Date Value Ref Range Status   • Glucose 04/08/2019 94  65 - 99 mg/dL Final   • BUN 04/08/2019 20  8 - 23 mg/dL Final   • Creatinine 04/08/2019 0.71  0.57 - 1.00 mg/dL Final   • Sodium 04/08/2019 143  136 - 145 mmol/L Final   • Potassium 04/08/2019 4.2  3.5 - 5.2 mmol/L Final   • Chloride 04/08/2019 107  98 - 107 mmol/L Final   • CO2 04/08/2019 25.0  22.0 - 29.0 mmol/L Final   • Calcium 04/08/2019 9.0  8.6 - 10.5 mg/dL Final   • Total Protein 04/08/2019 7.5  6.0 - 8.5 g/dL Final   • Albumin 04/08/2019 4.10  3.50 - 5.20 g/dL Final   • ALT (SGPT) 04/08/2019 11  1 - 33 U/L Final   • AST (SGOT) 04/08/2019 23  1 - 32 U/L Final   • Alkaline Phosphatase 04/08/2019 62  39 - 117 U/L Final   • Total Bilirubin 04/08/2019 0.7  0.2 - 1.2 mg/dL Final   • eGFR Non African Amer 04/08/2019 82  >60 mL/min/1.73 Final   • Globulin 04/08/2019 3.4  gm/dL Final   • A/G Ratio 04/08/2019 1.2  g/dL Final   • BUN/Creatinine Ratio 04/08/2019 28.2* 7.0 - 25.0 Final   • Anion Gap 04/08/2019 11.0  mmol/L Final   • WBC 04/08/2019 7.70  3.40 - 10.80 10*3/mm3 Final   • RBC 04/08/2019 4.33  3.77 - 5.28 10*6/mm3 Final   • Hemoglobin 04/08/2019 13.9  12.0 - 15.9 g/dL Final   • Hematocrit 04/08/2019 40.0  34.0 - 46.6 % Final   • RDW 04/08/2019 15.3  12.3 - 15.4 % Final   • MCV 04/08/2019 92.2  79.0 - 97.0 fL Final   • MCH 04/08/2019 32.0  26.6 - 33.0 pg Final   • MCHC 04/08/2019 34.7  31.5 - 35.7 g/dL Final   • MPV  04/08/2019 7.9  6.0 - 12.0 fL Final   • Platelets 04/08/2019 564* 140 - 450 10*3/mm3 Final   • Neutrophil % 04/08/2019 63.8  42.7 - 76.0 % Final   • Lymphocyte % 04/08/2019 29.7  19.6 - 45.3 % Final   • Monocyte % 04/08/2019 6.5  5.0 - 12.0 % Final   • Neutrophils, Absolute 04/08/2019 4.90  1.40 - 7.00 10*3/mm3 Final   • Lymphocytes, Absolute 04/08/2019 2.30  0.70 - 3.10 10*3/mm3 Final   • Monocytes, Absolute 04/08/2019 0.50  0.10 - 0.90 10*3/mm3 Final        Xr Chest 1 View    Result Date: 4/4/2019  Narrative: EXAMINATION: XR CHEST 1 VW- 04/04/2019  INDICATION: Lymphoma, port placement  COMPARISON: NONE  FINDINGS: Cardiac size within normal limits. Pulmonary vascularity within normal limits. No focal opacification or consolidation. No pneumothorax or significant effusion. Degenerative changes of the spine. Right chest wall Port-A-Cath terminates within the distal SVC. No postprocedural pneumothorax.         Impression: Right chest wall Port-A-Cath placement of a subclavian approach terminates within the distal SVC without postprocedural pneumothorax.  D:  04/04/2019 E:  04/04/2019  This report was finalized on 4/4/2019 12:55 PM by Dr. John Maddox.      Fl C Arm During Surgery    Result Date: 4/4/2019  Narrative: EXAMINATION: FL C ARM DURING SURGERY- 04/04/2019  INDICATION: Port Placement  TECHNIQUE: Intraoperative fluoroscopy for improved localization and treatment planning  COMPARISON: NONE  FINDINGS: Intraoperative fluoroscopy with total fluoroscopic time usage 7 seconds and no representative images saved during port placement.      Impression: Intraoperative fluoroscopy utilized during port placement.  D:  04/04/2019 E:  04/04/2019    This report was finalized on 4/4/2019 12:48 PM by Dr. John Maddox.        ASSESSMENT: The patient is a very pleasant 68 y.o. female  with mandible diffuse large B-cell lymphoma.    PROBLEM LIST:  1. Mandible diffuse large B-cell lymphoma, stage IIE:  A.  Presented with gum pain  and swelling  B.  CT facial bones done on February 26, 2019 revealed 2.3 cm lucency in the anterior mandible  C.  Status post biopsy done on March 7, 2019 consistent with diffuse large B-cell lymphoma  D.  Bone marrow biopsy done on March 25, 2019 no evidence of lymphoma involvement.  E.  Whole body PET scan done on March 28, 2019 revealed hypermetabolic activity in the right curtis-mandible as well as jugular cervical chains hypermetabolic lymphadenopathy level 2 and level 3.  F. Started chemotherapy using R-CHOP on 4/8/2019, status post 1 cycle.   2.  Osteoarthritis  3.  Chemotherapy-induced nausea  4.  Deep venous catheter    PLAN:  1.  I will proceed with chemotherapy as scheduled today R CHOP cycle #2.  2.  The patient will follow up with us in 3 weeks cycle #3 out of 6 planned.  3.  I will monitor the patient blood work including blood counts, kidney function, liver function, and electrolytes.  4.  I will repeat the patient scans prior to cycle #4.  5.  We reviewed again the potential risks and side effects of R CHOP chemotherapy including neutropenia, alopecia, nausea and vomiting, fatigue, neuropathy, cardiomyopathy, constipation, infusion reaction, and a small risk of myelodysplasia.  6.  She will continue Zofran as needed for chemotherapy-induced nausea.  7.  We will continue routine port care with each infusion as well as EMLA cream applied prior to port access.   8.  We will continue Mobic as needed for osteoarthritis.    9. She will continue omeprazole 20 mg daily for heartburn and indigestion. I did remind her to take her steroids with food.   10. She will continue Neulasta for bone marrow support with each cycle of chemotherapy.     Migdalia Blanca, APRN  4/29/2019

## 2019-04-29 NOTE — PROGRESS NOTES
Oncology Nutrition Follow Up    Patient Name:  Lanette Simpson  YOB: 1950  MRN: [id number]  Date: 04/29/19  Physician: Dr. Joan Zuluaga    Weight: 159#; wt at last visit (4/8) was 160#  Nutrition symptoms: none     Met with patient and  at second infusion appointment. Patient reports she has been tolerating her treatment well. Had some episodes of indigestion which she was prescribed omeprazole for and this helped immensely. States her appetite has been fair. Weight has been stable around ~160 lbs. She is staying well hydrated and denies any other nutrition symptoms at this time.    Briefly discussed the importance of good nutrition during treatment course focusing on adequate calorie, protein, nutrient and fluid intake. Emphasized having a protein source at each meal/snack.  Also reviewed staying well hydrated during the day while undergoing treatment.    Patient and  voiced understanding of information discussed. They have RD's contact information from last visit, encouraged them to call with any questions.  Will monitor as needed during treatment course.    Electronically signed by:  Michelle Middleton RDN  10:15 AM

## 2019-05-01 ENCOUNTER — TELEPHONE (OUTPATIENT)
Dept: FAMILY MEDICINE CLINIC | Facility: CLINIC | Age: 69
End: 2019-05-01

## 2019-05-01 RX ORDER — MELOXICAM 7.5 MG/1
7.5 TABLET ORAL DAILY
Qty: 30 TABLET | Refills: 0 | Status: SHIPPED | OUTPATIENT
Start: 2019-05-01 | End: 2020-11-18

## 2019-05-01 NOTE — TELEPHONE ENCOUNTER
Patient request refill:    meloxicam (MOBIC) 7.5 MG tablet   0 ordered           Summary: TAKE ONE TABLET BY MOUTH DAILY, Normal        She would like prescription sent to [facility] mail order and a 90 day supply.

## 2019-05-01 NOTE — TELEPHONE ENCOUNTER
Attempted to call patient. She cancelled her appt with Dr. MULLER today. The patient will need to schedule an appt before we can send anything over 30 day supply.   Medication has been sent to patients pharmacy for 30 day supply.

## 2019-05-01 NOTE — TELEPHONE ENCOUNTER
Pt was advised to make an appt for medication to be refilled, pt states that Chemo-doctor told her not to f/u with PCP until chemo therapy has been completed

## 2019-05-09 ENCOUNTER — TELEPHONE (OUTPATIENT)
Dept: ONCOLOGY | Facility: CLINIC | Age: 69
End: 2019-05-09

## 2019-05-09 NOTE — TELEPHONE ENCOUNTER
Patient called triage line to asked about what medication to take for constipation.  Patient reported that she has taken docusate, miralax, and increase fibrous food and hasn't had a bowel movement since Sunday.  Educated on getting mag citrate, adding daily senna s BID, increase water intake and continue with miralax.  Patient verbalized understanding.

## 2019-05-20 ENCOUNTER — OFFICE VISIT (OUTPATIENT)
Dept: ONCOLOGY | Facility: CLINIC | Age: 69
End: 2019-05-20

## 2019-05-20 ENCOUNTER — HOSPITAL ENCOUNTER (OUTPATIENT)
Dept: ONCOLOGY | Facility: HOSPITAL | Age: 69
Setting detail: INFUSION SERIES
Discharge: HOME | End: 2019-05-20

## 2019-05-20 VITALS — HEART RATE: 88 BPM | DIASTOLIC BLOOD PRESSURE: 55 MMHG | TEMPERATURE: 98.3 F | SYSTOLIC BLOOD PRESSURE: 132 MMHG

## 2019-05-20 VITALS
RESPIRATION RATE: 14 BRPM | HEIGHT: 63 IN | BODY MASS INDEX: 27.64 KG/M2 | DIASTOLIC BLOOD PRESSURE: 65 MMHG | WEIGHT: 156 LBS | SYSTOLIC BLOOD PRESSURE: 116 MMHG | TEMPERATURE: 97.6 F | HEART RATE: 84 BPM

## 2019-05-20 DIAGNOSIS — C83.31 DIFFUSE LARGE B-CELL LYMPHOMA OF LYMPH NODES OF HEAD (HCC): Primary | ICD-10-CM

## 2019-05-20 LAB
ALBUMIN SERPL-MCNC: 4 G/DL (ref 3.5–5.2)
ALBUMIN/GLOB SERPL: 1.3 G/DL
ALP SERPL-CCNC: 59 U/L (ref 39–117)
ALT SERPL W P-5'-P-CCNC: 15 U/L (ref 1–33)
ANION GAP SERPL CALCULATED.3IONS-SCNC: 9 MMOL/L
AST SERPL-CCNC: 19 U/L (ref 1–32)
BILIRUB SERPL-MCNC: 0.4 MG/DL (ref 0.2–1.2)
BUN BLD-MCNC: 13 MG/DL (ref 8–23)
BUN/CREAT SERPL: 21 (ref 7–25)
CALCIUM SPEC-SCNC: 9.4 MG/DL (ref 8.6–10.5)
CHLORIDE SERPL-SCNC: 109 MMOL/L (ref 98–107)
CO2 SERPL-SCNC: 24 MMOL/L (ref 22–29)
CREAT BLD-MCNC: 0.62 MG/DL (ref 0.57–1)
ERYTHROCYTE [DISTWIDTH] IN BLOOD BY AUTOMATED COUNT: 15.6 % (ref 12.3–15.4)
GFR SERPL CREATININE-BSD FRML MDRD: 95 ML/MIN/1.73
GLOBULIN UR ELPH-MCNC: 3.1 GM/DL
GLUCOSE BLD-MCNC: 87 MG/DL (ref 65–99)
HCT VFR BLD AUTO: 30.3 % (ref 34–46.6)
HGB BLD-MCNC: 10.3 G/DL (ref 12–15.9)
LYMPHOCYTES # BLD AUTO: 0.7 10*3/MM3 (ref 0.7–3.1)
LYMPHOCYTES NFR BLD AUTO: 17.2 % (ref 19.6–45.3)
MCH RBC QN AUTO: 31.2 PG (ref 26.6–33)
MCHC RBC AUTO-ENTMCNC: 34 G/DL (ref 31.5–35.7)
MCV RBC AUTO: 91.9 FL (ref 79–97)
MONOCYTES # BLD AUTO: 0.4 10*3/MM3 (ref 0.1–0.9)
MONOCYTES NFR BLD AUTO: 9.2 % (ref 5–12)
NEUTROPHILS # BLD AUTO: 2.9 10*3/MM3 (ref 1.7–7)
NEUTROPHILS NFR BLD AUTO: 73.6 % (ref 42.7–76)
PLATELET # BLD AUTO: 416 10*3/MM3 (ref 140–450)
PMV BLD AUTO: 7.4 FL (ref 6–12)
POTASSIUM BLD-SCNC: 4.1 MMOL/L (ref 3.5–5.2)
PROT SERPL-MCNC: 7.1 G/DL (ref 6–8.5)
RBC # BLD AUTO: 3.3 10*6/MM3 (ref 3.77–5.28)
SODIUM BLD-SCNC: 142 MMOL/L (ref 136–145)
WBC NRBC COR # BLD: 3.9 10*3/MM3 (ref 3.4–10.8)

## 2019-05-20 PROCEDURE — 96367 TX/PROPH/DG ADDL SEQ IV INF: CPT

## 2019-05-20 PROCEDURE — 96411 CHEMO IV PUSH ADDL DRUG: CPT

## 2019-05-20 PROCEDURE — 96413 CHEMO IV INFUSION 1 HR: CPT

## 2019-05-20 PROCEDURE — 80053 COMPREHEN METABOLIC PANEL: CPT | Performed by: INTERNAL MEDICINE

## 2019-05-20 PROCEDURE — 96415 CHEMO IV INFUSION ADDL HR: CPT

## 2019-05-20 PROCEDURE — 25010000002 RITUXIMAB 10 MG/ML SOLUTION 50 ML VIAL: Performed by: INTERNAL MEDICINE

## 2019-05-20 PROCEDURE — 25010000002 VINCRISTINE PER 1 MG: Performed by: INTERNAL MEDICINE

## 2019-05-20 PROCEDURE — 25010000002 PALONOSETRON PER 25 MCG: Performed by: INTERNAL MEDICINE

## 2019-05-20 PROCEDURE — 85025 COMPLETE CBC W/AUTO DIFF WBC: CPT | Performed by: INTERNAL MEDICINE

## 2019-05-20 PROCEDURE — 25010000002 DEXAMETHASONE PER 1 MG: Performed by: INTERNAL MEDICINE

## 2019-05-20 PROCEDURE — 25010000002 FOSAPREPITANT PER 1 MG: Performed by: INTERNAL MEDICINE

## 2019-05-20 PROCEDURE — 96366 THER/PROPH/DIAG IV INF ADDON: CPT

## 2019-05-20 PROCEDURE — 96375 TX/PRO/DX INJ NEW DRUG ADDON: CPT

## 2019-05-20 PROCEDURE — 99215 OFFICE O/P EST HI 40 MIN: CPT | Performed by: INTERNAL MEDICINE

## 2019-05-20 PROCEDURE — 25010000002 PEGFILGRASTIM 6 MG/0.6ML PREFILLED SYRINGE KIT: Performed by: INTERNAL MEDICINE

## 2019-05-20 PROCEDURE — 25010000002 RITUXIMAB 10 MG/ML SOLUTION 10 ML VIAL: Performed by: INTERNAL MEDICINE

## 2019-05-20 PROCEDURE — 96377 APPLICATON ON-BODY INJECTOR: CPT

## 2019-05-20 PROCEDURE — 25010000002 CYCLOPHOSPHAMIDE PER 100 MG: Performed by: INTERNAL MEDICINE

## 2019-05-20 PROCEDURE — 25010000002 DOXORUBICIN PER 10 MG: Performed by: INTERNAL MEDICINE

## 2019-05-20 PROCEDURE — 96417 CHEMO IV INFUS EACH ADDL SEQ: CPT

## 2019-05-20 PROCEDURE — 25010000002 DIPHENHYDRAMINE PER 50 MG: Performed by: INTERNAL MEDICINE

## 2019-05-20 RX ORDER — DIPHENHYDRAMINE HYDROCHLORIDE 50 MG/ML
50 INJECTION INTRAMUSCULAR; INTRAVENOUS AS NEEDED
Status: CANCELLED | OUTPATIENT
Start: 2019-05-20

## 2019-05-20 RX ORDER — PALONOSETRON 0.05 MG/ML
0.25 INJECTION, SOLUTION INTRAVENOUS ONCE
Status: COMPLETED | OUTPATIENT
Start: 2019-05-20 | End: 2019-05-20

## 2019-05-20 RX ORDER — DOXORUBICIN HYDROCHLORIDE 2 MG/ML
50 INJECTION, SOLUTION INTRAVENOUS ONCE
Status: CANCELLED | OUTPATIENT
Start: 2019-05-20

## 2019-05-20 RX ORDER — SODIUM CHLORIDE 0.9 % (FLUSH) 0.9 %
10 SYRINGE (ML) INJECTION AS NEEDED
Status: CANCELLED | OUTPATIENT
Start: 2019-05-20

## 2019-05-20 RX ORDER — SODIUM CHLORIDE 9 MG/ML
250 INJECTION, SOLUTION INTRAVENOUS ONCE
Status: CANCELLED | OUTPATIENT
Start: 2019-05-20

## 2019-05-20 RX ORDER — ACETAMINOPHEN 325 MG/1
650 TABLET ORAL ONCE
Status: COMPLETED | OUTPATIENT
Start: 2019-05-20 | End: 2019-05-20

## 2019-05-20 RX ORDER — DOXORUBICIN HYDROCHLORIDE 2 MG/ML
90 INJECTION, SOLUTION INTRAVENOUS ONCE
Status: COMPLETED | OUTPATIENT
Start: 2019-05-20 | End: 2019-05-20

## 2019-05-20 RX ORDER — MEPERIDINE HYDROCHLORIDE 50 MG/ML
25 INJECTION INTRAMUSCULAR; INTRAVENOUS; SUBCUTANEOUS
Status: CANCELLED | OUTPATIENT
Start: 2019-05-20 | End: 2019-05-21

## 2019-05-20 RX ORDER — FAMOTIDINE 10 MG/ML
20 INJECTION, SOLUTION INTRAVENOUS AS NEEDED
Status: CANCELLED | OUTPATIENT
Start: 2019-05-20

## 2019-05-20 RX ORDER — ACETAMINOPHEN 325 MG/1
650 TABLET ORAL ONCE
Status: CANCELLED | OUTPATIENT
Start: 2019-05-20

## 2019-05-20 RX ORDER — SODIUM CHLORIDE 9 MG/ML
250 INJECTION, SOLUTION INTRAVENOUS ONCE
Status: COMPLETED | OUTPATIENT
Start: 2019-05-20 | End: 2019-05-20

## 2019-05-20 RX ORDER — PALONOSETRON 0.05 MG/ML
0.25 INJECTION, SOLUTION INTRAVENOUS ONCE
Status: CANCELLED | OUTPATIENT
Start: 2019-05-20

## 2019-05-20 RX ORDER — SODIUM CHLORIDE 0.9 % (FLUSH) 0.9 %
10 SYRINGE (ML) INJECTION AS NEEDED
Status: DISCONTINUED | OUTPATIENT
Start: 2019-05-20 | End: 2019-05-21 | Stop reason: HOSPADM

## 2019-05-20 RX ADMIN — VINCRISTINE SULFATE 2 MG: 1 INJECTION, SOLUTION INTRAVENOUS at 13:35

## 2019-05-20 RX ADMIN — HEPARIN 500 UNITS: 100 SYRINGE at 14:33

## 2019-05-20 RX ADMIN — PEGFILGRASTIM 6 MG: KIT SUBCUTANEOUS at 14:28

## 2019-05-20 RX ADMIN — ACETAMINOPHEN 650 MG: 325 TABLET, FILM COATED ORAL at 09:50

## 2019-05-20 RX ADMIN — DOXORUBICIN HYDROCHLORIDE 90 MG: 2 INJECTION, SOLUTION INTRAVENOUS at 13:23

## 2019-05-20 RX ADMIN — RITUXIMAB 600 MG: 10 INJECTION, SOLUTION INTRAVENOUS at 10:15

## 2019-05-20 RX ADMIN — SODIUM CHLORIDE 250 ML: 9 INJECTION, SOLUTION INTRAVENOUS at 09:45

## 2019-05-20 RX ADMIN — PALONOSETRON HYDROCHLORIDE 0.25 MG: 0.25 INJECTION, SOLUTION INTRAVENOUS at 12:38

## 2019-05-20 RX ADMIN — DIPHENHYDRAMINE HYDROCHLORIDE 50 MG: 50 INJECTION INTRAMUSCULAR; INTRAVENOUS at 09:51

## 2019-05-20 RX ADMIN — SODIUM CHLORIDE 150 MG: 9 INJECTION, SOLUTION INTRAVENOUS at 12:40

## 2019-05-20 RX ADMIN — CYCLOPHOSPHAMIDE 1320 MG: 1 INJECTION, POWDER, FOR SOLUTION INTRAVENOUS; ORAL at 13:53

## 2019-05-20 RX ADMIN — DEXAMETHASONE SODIUM PHOSPHATE 12 MG: 4 INJECTION, SOLUTION INTRAMUSCULAR; INTRAVENOUS at 12:41

## 2019-05-20 RX ADMIN — SODIUM CHLORIDE, PRESERVATIVE FREE 10 ML: 5 INJECTION INTRAVENOUS at 14:32

## 2019-05-20 NOTE — PROGRESS NOTES
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  Staging workup revealed stage IIE disease.  Patient was started on definitive chemotherapy with R CHOP April 8, 2019. The patient is here today for cycle #3.    SUBJECTIVE: The patient is here today with her .  She has been able to tolerate treatment with multiple side effects which is complaint fatigue, she has been having poor appetite, she has nausea but no vomiting.  She usually feels better 2 weeks after the infusion.    PAST MEDICAL HISTORY/SOCIAL HISTORY/FAMILY HISTORY: Reviewed by me and unchanged from my documentation done on 05/20/19.    Review of Systems   Constitutional: Negative for activity change, appetite change, chills, fatigue, fever and unexpected weight change.   HENT: Negative for hearing loss, mouth sores, nosebleeds, sore throat and trouble swallowing.    Eyes: Negative for visual disturbance.   Respiratory: Negative for cough, chest tightness, shortness of breath and wheezing.    Cardiovascular: Negative for chest pain, palpitations and leg swelling.   Gastrointestinal: Negative for abdominal distention, abdominal pain, blood in stool, constipation, diarrhea, nausea, rectal pain and vomiting.   Endocrine: Negative for cold intolerance and heat intolerance.   Genitourinary: Negative for difficulty urinating, dysuria, frequency and urgency.   Musculoskeletal: Negative for arthralgias, back pain, gait problem, joint swelling and myalgias.   Skin: Negative for rash.   Neurological: Negative for dizziness, tremors, syncope, weakness, light-headedness, numbness and headaches.   Hematological: Negative for adenopathy. Does not bruise/bleed easily.   Psychiatric/Behavioral: Negative for confusion, sleep disturbance  "and suicidal ideas. The patient is nervous/anxious.          Current Outpatient Medications:   •  CALCIUM PO, Take 600 mg by mouth Daily., Disp: , Rfl:   •  lidocaine-prilocaine (EMLA) 2.5-2.5 % cream, Apply  topically to the appropriate area as directed As Needed (45-60 minutes prior to port access.  Cover with saran/plastic wrap.). (Patient taking differently: Apply 1 application topically to the appropriate area as directed As Needed (45-60 minutes prior to port access.  Cover with saran/plastic wrap.).), Disp: 30 g, Rfl: 3  •  meloxicam (MOBIC) 7.5 MG tablet, Take 1 tablet by mouth Daily., Disp: 30 tablet, Rfl: 0  •  Multiple Vitamins-Minerals (MULTIVITAMIN ADULT PO), Take 1 tablet by mouth Daily., Disp: , Rfl:   •  omeprazole (priLOSEC) 20 MG capsule, Take 1 capsule by mouth Daily. At bedtime, Disp: 90 capsule, Rfl: 3  •  ondansetron (ZOFRAN) 8 MG tablet, Take 1 tablet by mouth 3 (Three) Times a Day As Needed for Nausea or Vomiting., Disp: 30 tablet, Rfl: 5  •  oxybutynin XL (DITROPAN-XL) 10 MG 24 hr tablet, TAKE 1 TABLET EVERY DAY, Disp: 90 tablet, Rfl: 1  •  predniSONE (DELTASONE) 50 MG tablet, Take 50 mg by mouth Daily. As directed., Disp: , Rfl:     PHYSICAL EXAMINATION:   /65   Pulse 84   Temp 97.6 °F (36.4 °C) (Temporal)   Resp 14   Ht 160 cm (63\")   Wt 70.8 kg (156 lb)   BMI 27.63 kg/m²    ECOG Performance Status: 1 - Symptomatic but completely ambulatory  General Appearance:  alert, cooperative, no apparent distress and appears stated age   Neurologic/Psychiatric: A&O x 3, gait steady, appropriate affect, strength 5/5 in all muscle groups   HEENT:  Normocephalic, without obvious abnormality, mucous membranes moist   Neck: Supple, symmetrical, trachea midline, no adenopathy;  No thyromegaly, masses, or tenderness   Lungs:   Clear to auscultation bilaterally; respirations regular, even, and unlabored bilaterally   Heart:  Regular rate and rhythm, no murmurs appreciated   Abdomen:   Soft, " non-tender, non-distended and no organomegaly   Lymph nodes: No cervical, supraclavicular, inguinal or axillary adenopathy noted   Extremities: Normal, atraumatic; no clubbing, cyanosis, or edema    Skin: No rashes, ulcers, or suspicious lesions noted     No visits with results within 2 Week(s) from this visit.   Latest known visit with results is:   Hospital Outpatient Visit on 04/29/2019   Component Date Value Ref Range Status   • Glucose 04/29/2019 86  65 - 99 mg/dL Final   • BUN 04/29/2019 17  8 - 23 mg/dL Final   • Creatinine 04/29/2019 0.69  0.57 - 1.00 mg/dL Final   • Sodium 04/29/2019 142  136 - 145 mmol/L Final   • Potassium 04/29/2019 4.0  3.5 - 5.2 mmol/L Final   • Chloride 04/29/2019 107  98 - 107 mmol/L Final   • CO2 04/29/2019 24.0  22.0 - 29.0 mmol/L Final   • Calcium 04/29/2019 9.3  8.6 - 10.5 mg/dL Final   • Total Protein 04/29/2019 7.0  6.0 - 8.5 g/dL Final   • Albumin 04/29/2019 3.90  3.50 - 5.20 g/dL Final   • ALT (SGPT) 04/29/2019 17  1 - 33 U/L Final   • AST (SGOT) 04/29/2019 20  1 - 32 U/L Final   • Alkaline Phosphatase 04/29/2019 58  39 - 117 U/L Final   • Total Bilirubin 04/29/2019 0.4  0.2 - 1.2 mg/dL Final   • eGFR Non African Amer 04/29/2019 84  >60 mL/min/1.73 Final   • Globulin 04/29/2019 3.1  gm/dL Final   • A/G Ratio 04/29/2019 1.3  g/dL Final   • BUN/Creatinine Ratio 04/29/2019 24.6  7.0 - 25.0 Final   • Anion Gap 04/29/2019 11.0  mmol/L Final   • WBC 04/29/2019 5.80  3.40 - 10.80 10*3/mm3 Final   • RBC 04/29/2019 3.86  3.77 - 5.28 10*6/mm3 Final   • Hemoglobin 04/29/2019 12.0  12.0 - 15.9 g/dL Final   • Hematocrit 04/29/2019 34.9  34.0 - 46.6 % Final   • RDW 04/29/2019 14.5  12.3 - 15.4 % Final   • MCV 04/29/2019 90.4  79.0 - 97.0 fL Final   • MCH 04/29/2019 31.1  26.6 - 33.0 pg Final   • MCHC 04/29/2019 34.4  31.5 - 35.7 g/dL Final   • MPV 04/29/2019 7.6  6.0 - 12.0 fL Final   • Platelets 04/29/2019 458* 140 - 450 10*3/mm3 Final   • Neutrophil % 04/29/2019 67.9  42.7 - 76.0 % Final    • Lymphocyte % 04/29/2019 23.1  19.6 - 45.3 % Final   • Monocyte % 04/29/2019 9.0  5.0 - 12.0 % Final   • Neutrophils, Absolute 04/29/2019 3.90  1.70 - 7.00 10*3/mm3 Final   • Lymphocytes, Absolute 04/29/2019 1.30  0.70 - 3.10 10*3/mm3 Final   • Monocytes, Absolute 04/29/2019 0.50  0.10 - 0.90 10*3/mm3 Final        No results found.    ASSESSMENT: The patient is a very pleasant 69 y.o. female  with mandible diffuse large B-cell lymphoma.    PROBLEM LIST:  1. Mandible diffuse large B-cell lymphoma, stage IIE:  A.  Presented with gum pain and swelling  B.  CT facial bones done on February 26, 2019 revealed 2.3 cm lucency in the anterior mandible  C.  Status post biopsy done on March 7, 2019 consistent with diffuse large B-cell lymphoma  D.  Bone marrow biopsy done on March 25, 2019 no evidence of lymphoma involvement.  E.  Whole body PET scan done on March 28, 2019 revealed hypermetabolic activity in the right curtis-mandible as well as jugular cervical chains hypermetabolic lymphadenopathy level 2 and level 3.  F. Started chemotegrapy with R-CHOP April 2019,status post 2 cycles.  2.  Osteoarthritis  3.  Chemotherapy-induced nausea  4.  Deep venous catheter  5.  Chemotherapy-induced constipation  6.  Thrombocytosis, reactive    PLAN:  1.  I will proceed with chemotherapy as scheduled today R CHOP cycle #3.  2.  The patient will follow up with us in 3 weeks cycle #4 out of 6 planned.  3.  I will monitor the patient blood work including blood counts kidney function liver function and electrolytes.  4.  I will repeat the patient scans prior to cycle #4. Those will be ordered prior to return.  5.  We discussed the potential risks and side effects of R CHOP chemotherapy including neutropenia, alopecia, nausea and vomiting, fatigue, neuropathy, cardiomyopathy, constipation, infusion reaction, and a small risk of myelodysplasia.  6.  I will continue the patient on Zofran as needed for chemotherapy-induced nausea.  7.  We  will continue port care.  Patient was given prescription for EMLA cream.  The patient would be at a higher risk of catheter related infections.  8.  We will continue Mobic as needed for osteoarthritis.  Patient would be at high risk of bleeding since she might develop thrombocytopenia while she is on chemotherapy.  9.  We will continue monitor her CBC, platelets are going down gradually.  10. Will continue bowel regimen for constipation.    Joan Zuluaga MD  5/20/2019

## 2019-06-10 ENCOUNTER — OFFICE VISIT (OUTPATIENT)
Dept: ONCOLOGY | Facility: CLINIC | Age: 69
End: 2019-06-10

## 2019-06-10 ENCOUNTER — HOSPITAL ENCOUNTER (OUTPATIENT)
Dept: ONCOLOGY | Facility: HOSPITAL | Age: 69
Setting detail: INFUSION SERIES
Discharge: HOME | End: 2019-06-10

## 2019-06-10 VITALS
HEART RATE: 107 BPM | SYSTOLIC BLOOD PRESSURE: 121 MMHG | DIASTOLIC BLOOD PRESSURE: 72 MMHG | BODY MASS INDEX: 27.46 KG/M2 | WEIGHT: 155 LBS | RESPIRATION RATE: 12 BRPM | TEMPERATURE: 97.6 F | OXYGEN SATURATION: 97 % | HEIGHT: 63 IN

## 2019-06-10 VITALS — TEMPERATURE: 97.7 F | HEART RATE: 91 BPM | DIASTOLIC BLOOD PRESSURE: 50 MMHG | SYSTOLIC BLOOD PRESSURE: 121 MMHG

## 2019-06-10 DIAGNOSIS — C83.31 DIFFUSE LARGE B-CELL LYMPHOMA OF LYMPH NODES OF HEAD (HCC): Primary | ICD-10-CM

## 2019-06-10 LAB
ALBUMIN SERPL-MCNC: 3.9 G/DL (ref 3.5–5.2)
ALBUMIN/GLOB SERPL: 1.5 G/DL
ALP SERPL-CCNC: 52 U/L (ref 39–117)
ALT SERPL W P-5'-P-CCNC: 12 U/L (ref 1–33)
ANION GAP SERPL CALCULATED.3IONS-SCNC: 12 MMOL/L
AST SERPL-CCNC: 18 U/L (ref 1–32)
BILIRUB SERPL-MCNC: 0.4 MG/DL (ref 0.2–1.2)
BUN BLD-MCNC: 17 MG/DL (ref 8–23)
BUN/CREAT SERPL: 26.6 (ref 7–25)
CALCIUM SPEC-SCNC: 9.3 MG/DL (ref 8.6–10.5)
CHLORIDE SERPL-SCNC: 107 MMOL/L (ref 98–107)
CO2 SERPL-SCNC: 23 MMOL/L (ref 22–29)
CREAT BLD-MCNC: 0.64 MG/DL (ref 0.57–1)
ERYTHROCYTE [DISTWIDTH] IN BLOOD BY AUTOMATED COUNT: 18.1 % (ref 12.3–15.4)
GFR SERPL CREATININE-BSD FRML MDRD: 92 ML/MIN/1.73
GLOBULIN UR ELPH-MCNC: 2.6 GM/DL
GLUCOSE BLD-MCNC: 89 MG/DL (ref 65–99)
HCT VFR BLD AUTO: 28.5 % (ref 34–46.6)
HGB BLD-MCNC: 9.5 G/DL (ref 12–15.9)
LYMPHOCYTES # BLD AUTO: 0.6 10*3/MM3 (ref 0.7–3.1)
LYMPHOCYTES NFR BLD AUTO: 18.6 % (ref 19.6–45.3)
MCH RBC QN AUTO: 30.9 PG (ref 26.6–33)
MCHC RBC AUTO-ENTMCNC: 33.3 G/DL (ref 31.5–35.7)
MCV RBC AUTO: 92.8 FL (ref 79–97)
MONOCYTES # BLD AUTO: 0.3 10*3/MM3 (ref 0.1–0.9)
MONOCYTES NFR BLD AUTO: 11 % (ref 5–12)
NEUTROPHILS # BLD AUTO: 2.1 10*3/MM3 (ref 1.7–7)
NEUTROPHILS NFR BLD AUTO: 70.4 % (ref 42.7–76)
PLATELET # BLD AUTO: 339 10*3/MM3 (ref 140–450)
PMV BLD AUTO: 7 FL (ref 6–12)
POTASSIUM BLD-SCNC: 4.1 MMOL/L (ref 3.5–5.2)
PROT SERPL-MCNC: 6.5 G/DL (ref 6–8.5)
RBC # BLD AUTO: 3.07 10*6/MM3 (ref 3.77–5.28)
SODIUM BLD-SCNC: 142 MMOL/L (ref 136–145)
WBC NRBC COR # BLD: 3 10*3/MM3 (ref 3.4–10.8)

## 2019-06-10 PROCEDURE — 25010000002 PALONOSETRON PER 25 MCG: Performed by: INTERNAL MEDICINE

## 2019-06-10 PROCEDURE — 25010000002 DEXAMETHASONE PER 1 MG: Performed by: INTERNAL MEDICINE

## 2019-06-10 PROCEDURE — 25010000002 DOXORUBICIN PER 10 MG: Performed by: INTERNAL MEDICINE

## 2019-06-10 PROCEDURE — 96411 CHEMO IV PUSH ADDL DRUG: CPT

## 2019-06-10 PROCEDURE — 96367 TX/PROPH/DG ADDL SEQ IV INF: CPT

## 2019-06-10 PROCEDURE — 85025 COMPLETE CBC W/AUTO DIFF WBC: CPT | Performed by: INTERNAL MEDICINE

## 2019-06-10 PROCEDURE — 25010000002 DIPHENHYDRAMINE PER 50 MG: Performed by: INTERNAL MEDICINE

## 2019-06-10 PROCEDURE — 25010000002 VINCRISTINE PER 1 MG: Performed by: INTERNAL MEDICINE

## 2019-06-10 PROCEDURE — 80053 COMPREHEN METABOLIC PANEL: CPT | Performed by: INTERNAL MEDICINE

## 2019-06-10 PROCEDURE — 96417 CHEMO IV INFUS EACH ADDL SEQ: CPT

## 2019-06-10 PROCEDURE — 25010000002 PEGFILGRASTIM 6 MG/0.6ML PREFILLED SYRINGE KIT: Performed by: INTERNAL MEDICINE

## 2019-06-10 PROCEDURE — 96377 APPLICATON ON-BODY INJECTOR: CPT

## 2019-06-10 PROCEDURE — 96413 CHEMO IV INFUSION 1 HR: CPT

## 2019-06-10 PROCEDURE — 99215 OFFICE O/P EST HI 40 MIN: CPT | Performed by: INTERNAL MEDICINE

## 2019-06-10 PROCEDURE — 25010000002 RITUXIMAB 10 MG/ML SOLUTION 50 ML VIAL: Performed by: INTERNAL MEDICINE

## 2019-06-10 PROCEDURE — 96375 TX/PRO/DX INJ NEW DRUG ADDON: CPT

## 2019-06-10 PROCEDURE — 96376 TX/PRO/DX INJ SAME DRUG ADON: CPT

## 2019-06-10 PROCEDURE — 25010000002 RITUXIMAB 10 MG/ML SOLUTION 10 ML VIAL: Performed by: INTERNAL MEDICINE

## 2019-06-10 PROCEDURE — 25010000002 FOSAPREPITANT PER 1 MG: Performed by: INTERNAL MEDICINE

## 2019-06-10 PROCEDURE — 96415 CHEMO IV INFUSION ADDL HR: CPT

## 2019-06-10 PROCEDURE — 25010000002 CYCLOPHOSPHAMIDE PER 100 MG: Performed by: INTERNAL MEDICINE

## 2019-06-10 RX ORDER — ACETAMINOPHEN 325 MG/1
650 TABLET ORAL ONCE
Status: COMPLETED | OUTPATIENT
Start: 2019-06-10 | End: 2019-06-10

## 2019-06-10 RX ORDER — PALONOSETRON 0.05 MG/ML
0.25 INJECTION, SOLUTION INTRAVENOUS ONCE
Status: CANCELLED | OUTPATIENT
Start: 2019-06-10

## 2019-06-10 RX ORDER — PALONOSETRON 0.05 MG/ML
0.25 INJECTION, SOLUTION INTRAVENOUS ONCE
Status: COMPLETED | OUTPATIENT
Start: 2019-06-10 | End: 2019-06-10

## 2019-06-10 RX ORDER — SODIUM CHLORIDE 0.9 % (FLUSH) 0.9 %
10 SYRINGE (ML) INJECTION AS NEEDED
Status: CANCELLED | OUTPATIENT
Start: 2019-06-10

## 2019-06-10 RX ORDER — MEPERIDINE HYDROCHLORIDE 50 MG/ML
25 INJECTION INTRAMUSCULAR; INTRAVENOUS; SUBCUTANEOUS
Status: CANCELLED | OUTPATIENT
Start: 2019-06-10 | End: 2019-06-11

## 2019-06-10 RX ORDER — FAMOTIDINE 10 MG/ML
20 INJECTION, SOLUTION INTRAVENOUS AS NEEDED
Status: DISCONTINUED | OUTPATIENT
Start: 2019-06-10 | End: 2019-06-11 | Stop reason: HOSPADM

## 2019-06-10 RX ORDER — SODIUM CHLORIDE 9 MG/ML
250 INJECTION, SOLUTION INTRAVENOUS ONCE
Status: CANCELLED | OUTPATIENT
Start: 2019-06-10

## 2019-06-10 RX ORDER — SODIUM CHLORIDE 0.9 % (FLUSH) 0.9 %
10 SYRINGE (ML) INJECTION AS NEEDED
Status: DISCONTINUED | OUTPATIENT
Start: 2019-06-10 | End: 2019-06-11 | Stop reason: HOSPADM

## 2019-06-10 RX ORDER — DIPHENHYDRAMINE HYDROCHLORIDE 50 MG/ML
50 INJECTION INTRAMUSCULAR; INTRAVENOUS AS NEEDED
Status: CANCELLED | OUTPATIENT
Start: 2019-06-10

## 2019-06-10 RX ORDER — ACETAMINOPHEN 325 MG/1
650 TABLET ORAL ONCE
Status: CANCELLED | OUTPATIENT
Start: 2019-06-10

## 2019-06-10 RX ORDER — DIPHENHYDRAMINE HYDROCHLORIDE 50 MG/ML
50 INJECTION INTRAMUSCULAR; INTRAVENOUS AS NEEDED
Status: DISCONTINUED | OUTPATIENT
Start: 2019-06-10 | End: 2019-06-11 | Stop reason: HOSPADM

## 2019-06-10 RX ORDER — FAMOTIDINE 10 MG/ML
20 INJECTION, SOLUTION INTRAVENOUS AS NEEDED
Status: CANCELLED | OUTPATIENT
Start: 2019-06-10

## 2019-06-10 RX ORDER — SODIUM CHLORIDE 9 MG/ML
250 INJECTION, SOLUTION INTRAVENOUS ONCE
Status: COMPLETED | OUTPATIENT
Start: 2019-06-10 | End: 2019-06-10

## 2019-06-10 RX ORDER — DOXORUBICIN HYDROCHLORIDE 2 MG/ML
90 INJECTION, SOLUTION INTRAVENOUS ONCE
Status: COMPLETED | OUTPATIENT
Start: 2019-06-10 | End: 2019-06-10

## 2019-06-10 RX ORDER — MEPERIDINE HYDROCHLORIDE 50 MG/ML
25 INJECTION INTRAMUSCULAR; INTRAVENOUS; SUBCUTANEOUS
Status: DISCONTINUED | OUTPATIENT
Start: 2019-06-10 | End: 2019-06-11 | Stop reason: HOSPADM

## 2019-06-10 RX ORDER — DOXORUBICIN HYDROCHLORIDE 2 MG/ML
50 INJECTION, SOLUTION INTRAVENOUS ONCE
Status: CANCELLED | OUTPATIENT
Start: 2019-06-10

## 2019-06-10 RX ADMIN — SODIUM CHLORIDE 150 MG: 9 INJECTION, SOLUTION INTRAVENOUS at 12:09

## 2019-06-10 RX ADMIN — ACETAMINOPHEN 650 MG: 325 TABLET ORAL at 09:19

## 2019-06-10 RX ADMIN — SODIUM CHLORIDE 250 ML: 9 INJECTION, SOLUTION INTRAVENOUS at 09:23

## 2019-06-10 RX ADMIN — RITUXIMAB 600 MG: 10 INJECTION, SOLUTION INTRAVENOUS at 09:50

## 2019-06-10 RX ADMIN — DOXORUBICIN HYDROCHLORIDE 90 MG: 2 INJECTION, SOLUTION INTRAVENOUS at 12:51

## 2019-06-10 RX ADMIN — CYCLOPHOSPHAMIDE 1320 MG: 1 INJECTION, POWDER, FOR SOLUTION INTRAVENOUS; ORAL at 13:31

## 2019-06-10 RX ADMIN — DIPHENHYDRAMINE HYDROCHLORIDE 50 MG: 50 INJECTION INTRAMUSCULAR; INTRAVENOUS at 09:23

## 2019-06-10 RX ADMIN — HEPARIN 500 UNITS: 100 SYRINGE at 14:17

## 2019-06-10 RX ADMIN — PALONOSETRON HYDROCHLORIDE 0.25 MG: 0.25 INJECTION, SOLUTION INTRAVENOUS at 12:05

## 2019-06-10 RX ADMIN — DEXAMETHASONE SODIUM PHOSPHATE 12 MG: 4 INJECTION, SOLUTION INTRAMUSCULAR; INTRAVENOUS at 12:09

## 2019-06-10 RX ADMIN — VINCRISTINE SULFATE 2 MG: 1 INJECTION, SOLUTION INTRAVENOUS at 13:10

## 2019-06-10 RX ADMIN — SODIUM CHLORIDE, PRESERVATIVE FREE 10 ML: 5 INJECTION INTRAVENOUS at 14:17

## 2019-06-10 RX ADMIN — PEGFILGRASTIM 6 MG: KIT SUBCUTANEOUS at 14:16

## 2019-06-10 NOTE — PROGRESS NOTES
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  Staging workup revealed stage IIE disease.  Patient was started on definitive chemotherapy with R CHOP April 8, 2019. The patient is here today for cycle #4.    SUBJECTIVE: The patient is here today with her .  She is doing fairly well.  She is still able to tolerate treatment without any serious side effects.  She is complaining of mild fatigue.    PAST MEDICAL HISTORY/SOCIAL HISTORY/FAMILY HISTORY: Reviewed by me and unchanged from my documentation done on 06/10/19.    Review of Systems   Constitutional: Negative for activity change, appetite change, chills, fatigue, fever and unexpected weight change.   HENT: Negative for hearing loss, mouth sores, nosebleeds, sore throat and trouble swallowing.    Eyes: Negative for visual disturbance.   Respiratory: Negative for cough, chest tightness, shortness of breath and wheezing.    Cardiovascular: Negative for chest pain, palpitations and leg swelling.   Gastrointestinal: Negative for abdominal distention, abdominal pain, blood in stool, constipation, diarrhea, nausea, rectal pain and vomiting.   Endocrine: Negative for cold intolerance and heat intolerance.   Genitourinary: Negative for difficulty urinating, dysuria, frequency and urgency.   Musculoskeletal: Negative for arthralgias, back pain, gait problem, joint swelling and myalgias.   Skin: Negative for rash.   Neurological: Negative for dizziness, tremors, syncope, weakness, light-headedness, numbness and headaches.   Hematological: Negative for adenopathy. Does not bruise/bleed easily.   Psychiatric/Behavioral: Negative for confusion, sleep disturbance and suicidal ideas. The patient is nervous/anxious.          Current Outpatient  "Medications:   •  CALCIUM PO, Take 600 mg by mouth Daily., Disp: , Rfl:   •  lidocaine-prilocaine (EMLA) 2.5-2.5 % cream, Apply  topically to the appropriate area as directed As Needed (45-60 minutes prior to port access.  Cover with saran/plastic wrap.). (Patient taking differently: Apply 1 application topically to the appropriate area as directed As Needed (45-60 minutes prior to port access.  Cover with saran/plastic wrap.).), Disp: 30 g, Rfl: 3  •  meloxicam (MOBIC) 7.5 MG tablet, Take 1 tablet by mouth Daily., Disp: 30 tablet, Rfl: 0  •  Multiple Vitamins-Minerals (MULTIVITAMIN ADULT PO), Take 1 tablet by mouth Daily., Disp: , Rfl:   •  omeprazole (priLOSEC) 20 MG capsule, Take 1 capsule by mouth Daily. At bedtime, Disp: 90 capsule, Rfl: 3  •  ondansetron (ZOFRAN) 8 MG tablet, Take 1 tablet by mouth 3 (Three) Times a Day As Needed for Nausea or Vomiting., Disp: 30 tablet, Rfl: 5  •  oxybutynin XL (DITROPAN-XL) 10 MG 24 hr tablet, TAKE 1 TABLET EVERY DAY, Disp: 90 tablet, Rfl: 1  •  predniSONE (DELTASONE) 50 MG tablet, Take 50 mg by mouth Daily. As directed., Disp: , Rfl:     PHYSICAL EXAMINATION:   /72   Pulse 107   Temp 97.6 °F (36.4 °C) (Temporal)   Resp 12   Ht 160 cm (63\")   Wt 70.3 kg (155 lb)   SpO2 97%   BMI 27.46 kg/m²    ECOG Performance Status: 1 - Symptomatic but completely ambulatory  General Appearance:  alert, cooperative, no apparent distress and appears stated age   Neurologic/Psychiatric: A&O x 3, gait steady, appropriate affect, strength 5/5 in all muscle groups   HEENT:  Normocephalic, without obvious abnormality, mucous membranes moist   Neck: Supple, symmetrical, trachea midline, no adenopathy;  No thyromegaly, masses, or tenderness   Lungs:   Clear to auscultation bilaterally; respirations regular, even, and unlabored bilaterally   Heart:  Regular rate and rhythm, no murmurs appreciated   Abdomen:   Soft, non-tender, non-distended and no organomegaly   Lymph nodes: No " cervical, supraclavicular, inguinal or axillary adenopathy noted   Extremities: Normal, atraumatic; no clubbing, cyanosis, or edema    Skin: No rashes, ulcers, or suspicious lesions noted     No visits with results within 2 Week(s) from this visit.   Latest known visit with results is:   Hospital Outpatient Visit on 05/20/2019   Component Date Value Ref Range Status   • Glucose 05/20/2019 87  65 - 99 mg/dL Final   • BUN 05/20/2019 13  8 - 23 mg/dL Final   • Creatinine 05/20/2019 0.62  0.57 - 1.00 mg/dL Final   • Sodium 05/20/2019 142  136 - 145 mmol/L Final   • Potassium 05/20/2019 4.1  3.5 - 5.2 mmol/L Final   • Chloride 05/20/2019 109* 98 - 107 mmol/L Final   • CO2 05/20/2019 24.0  22.0 - 29.0 mmol/L Final   • Calcium 05/20/2019 9.4  8.6 - 10.5 mg/dL Final   • Total Protein 05/20/2019 7.1  6.0 - 8.5 g/dL Final   • Albumin 05/20/2019 4.00  3.50 - 5.20 g/dL Final   • ALT (SGPT) 05/20/2019 15  1 - 33 U/L Final   • AST (SGOT) 05/20/2019 19  1 - 32 U/L Final   • Alkaline Phosphatase 05/20/2019 59  39 - 117 U/L Final   • Total Bilirubin 05/20/2019 0.4  0.2 - 1.2 mg/dL Final   • eGFR Non African Amer 05/20/2019 95  >60 mL/min/1.73 Final   • Globulin 05/20/2019 3.1  gm/dL Final   • A/G Ratio 05/20/2019 1.3  g/dL Final   • BUN/Creatinine Ratio 05/20/2019 21.0  7.0 - 25.0 Final   • Anion Gap 05/20/2019 9.0  mmol/L Final   • WBC 05/20/2019 3.90  3.40 - 10.80 10*3/mm3 Final   • RBC 05/20/2019 3.30* 3.77 - 5.28 10*6/mm3 Final   • Hemoglobin 05/20/2019 10.3* 12.0 - 15.9 g/dL Final   • Hematocrit 05/20/2019 30.3* 34.0 - 46.6 % Final   • RDW 05/20/2019 15.6* 12.3 - 15.4 % Final   • MCV 05/20/2019 91.9  79.0 - 97.0 fL Final   • MCH 05/20/2019 31.2  26.6 - 33.0 pg Final   • MCHC 05/20/2019 34.0  31.5 - 35.7 g/dL Final   • MPV 05/20/2019 7.4  6.0 - 12.0 fL Final   • Platelets 05/20/2019 416  140 - 450 10*3/mm3 Final   • Neutrophil % 05/20/2019 73.6  42.7 - 76.0 % Final   • Lymphocyte % 05/20/2019 17.2* 19.6 - 45.3 % Final   •  Monocyte % 05/20/2019 9.2  5.0 - 12.0 % Final   • Neutrophils, Absolute 05/20/2019 2.90  1.70 - 7.00 10*3/mm3 Final   • Lymphocytes, Absolute 05/20/2019 0.70  0.70 - 3.10 10*3/mm3 Final   • Monocytes, Absolute 05/20/2019 0.40  0.10 - 0.90 10*3/mm3 Final        No results found.    ASSESSMENT: The patient is a very pleasant 69 y.o. female  with mandible diffuse large B-cell lymphoma.    PROBLEM LIST:  1. Mandible diffuse large B-cell lymphoma, stage IIE:  A.  Presented with gum pain and swelling  B.  CT facial bones done on February 26, 2019 revealed 2.3 cm lucency in the anterior mandible  C.  Status post biopsy done on March 7, 2019 consistent with diffuse large B-cell lymphoma  D.  Bone marrow biopsy done on March 25, 2019 no evidence of lymphoma involvement.  E.  Whole body PET scan done on March 28, 2019 revealed hypermetabolic activity in the right curtis-mandible as well as jugular cervical chains hypermetabolic lymphadenopathy level 2 and level 3.  F. Started chemotegrapy with R-CHOP April 2019,status post 3 cycles.  2.  Osteoarthritis  3.  Chemotherapy-induced nausea  4.  Deep venous catheter  5.  Chemotherapy-induced constipation  6.  Thrombocytosis, reactive    PLAN:  1.  I will proceed with chemotherapy as scheduled today R CHOP cycle #4.  2.  The patient will follow up with us in 3 weeks cycle #5 out of 6 planned.  3.  I will monitor the patient blood work including blood counts kidney function liver function and electrolytes.  4.  I will obtain her scan results, will download her CD at our system.  I will repeat the patient scans after cycle #6.   5.  We discussed the potential risks and side effects of R CHOP chemotherapy including neutropenia, alopecia, nausea and vomiting, fatigue, neuropathy, cardiomyopathy, constipation, infusion reaction, and a small risk of myelodysplasia.  6.  I will continue the patient on Zofran as needed for chemotherapy-induced nausea.  7.  We will continue port care.  Patient  was given prescription for EMLA cream.  The patient would be at a higher risk of catheter related infections.  8.  We will continue Mobic as needed for osteoarthritis.  Patient would be at high risk of bleeding since she might develop thrombocytopenia while she is on chemotherapy.  9.  We will continue monitor her CBC, platelets are going down gradually.  10. Will continue bowel regimen for constipation.    Joan Zuluaga MD  6/10/2019

## 2019-07-01 ENCOUNTER — HOSPITAL ENCOUNTER (OUTPATIENT)
Dept: ONCOLOGY | Facility: HOSPITAL | Age: 69
Setting detail: INFUSION SERIES
Discharge: HOME | End: 2019-07-01

## 2019-07-01 ENCOUNTER — OFFICE VISIT (OUTPATIENT)
Dept: ONCOLOGY | Facility: CLINIC | Age: 69
End: 2019-07-01

## 2019-07-01 VITALS — HEART RATE: 102 BPM | TEMPERATURE: 97.1 F | DIASTOLIC BLOOD PRESSURE: 60 MMHG | SYSTOLIC BLOOD PRESSURE: 142 MMHG

## 2019-07-01 VITALS
HEIGHT: 63 IN | HEART RATE: 95 BPM | TEMPERATURE: 97.6 F | OXYGEN SATURATION: 98 % | SYSTOLIC BLOOD PRESSURE: 123 MMHG | WEIGHT: 152 LBS | RESPIRATION RATE: 16 BRPM | BODY MASS INDEX: 26.93 KG/M2 | DIASTOLIC BLOOD PRESSURE: 66 MMHG

## 2019-07-01 DIAGNOSIS — C83.31 DIFFUSE LARGE B-CELL LYMPHOMA OF LYMPH NODES OF HEAD (HCC): Primary | ICD-10-CM

## 2019-07-01 LAB
ALBUMIN SERPL-MCNC: 4.1 G/DL (ref 3.5–5.2)
ALBUMIN/GLOB SERPL: 1.6 G/DL
ALP SERPL-CCNC: 51 U/L (ref 39–117)
ALT SERPL W P-5'-P-CCNC: 16 U/L (ref 1–33)
ANION GAP SERPL CALCULATED.3IONS-SCNC: 10 MMOL/L (ref 5–15)
AST SERPL-CCNC: 26 U/L (ref 1–32)
BACTERIA UR QL AUTO: ABNORMAL /HPF
BILIRUB SERPL-MCNC: 0.5 MG/DL (ref 0.2–1.2)
BILIRUB UR QL STRIP: NEGATIVE
BUN BLD-MCNC: 13 MG/DL (ref 8–23)
BUN/CREAT SERPL: 25.5 (ref 7–25)
CALCIUM SPEC-SCNC: 9.4 MG/DL (ref 8.6–10.5)
CHLORIDE SERPL-SCNC: 107 MMOL/L (ref 98–107)
CLARITY UR: CLEAR
CO2 SERPL-SCNC: 24 MMOL/L (ref 22–29)
COLOR UR: YELLOW
CREAT BLD-MCNC: 0.51 MG/DL (ref 0.57–1)
ERYTHROCYTE [DISTWIDTH] IN BLOOD BY AUTOMATED COUNT: 17.2 % (ref 12.3–15.4)
GFR SERPL CREATININE-BSD FRML MDRD: 119 ML/MIN/1.73
GLOBULIN UR ELPH-MCNC: 2.6 GM/DL
GLUCOSE BLD-MCNC: 88 MG/DL (ref 65–99)
GLUCOSE UR STRIP-MCNC: NEGATIVE MG/DL
HCT VFR BLD AUTO: 25.6 % (ref 34–46.6)
HGB BLD-MCNC: 8.8 G/DL (ref 12–15.9)
HGB UR QL STRIP.AUTO: NEGATIVE
HYALINE CASTS UR QL AUTO: ABNORMAL /LPF
KETONES UR QL STRIP: NEGATIVE
LEUKOCYTE ESTERASE UR QL STRIP.AUTO: ABNORMAL
LYMPHOCYTES # BLD AUTO: 0.6 10*3/MM3 (ref 0.7–3.1)
LYMPHOCYTES NFR BLD AUTO: 27.5 % (ref 19.6–45.3)
MCH RBC QN AUTO: 33.2 PG (ref 26.6–33)
MCHC RBC AUTO-ENTMCNC: 34.4 G/DL (ref 31.5–35.7)
MCV RBC AUTO: 96.4 FL (ref 79–97)
MONOCYTES # BLD AUTO: 0.3 10*3/MM3 (ref 0.1–0.9)
MONOCYTES NFR BLD AUTO: 14.5 % (ref 5–12)
NEUTROPHILS # BLD AUTO: 1.3 10*3/MM3 (ref 1.7–7)
NEUTROPHILS NFR BLD AUTO: 58 % (ref 42.7–76)
NITRITE UR QL STRIP: NEGATIVE
PH UR STRIP.AUTO: 7 [PH] (ref 5–8)
PLATELET # BLD AUTO: 282 10*3/MM3 (ref 140–450)
PMV BLD AUTO: 6.5 FL (ref 6–12)
POTASSIUM BLD-SCNC: 4 MMOL/L (ref 3.5–5.2)
PROT SERPL-MCNC: 6.7 G/DL (ref 6–8.5)
PROT UR QL STRIP: NEGATIVE
RBC # BLD AUTO: 2.65 10*6/MM3 (ref 3.77–5.28)
RBC # UR: ABNORMAL /HPF
REF LAB TEST METHOD: ABNORMAL
SODIUM BLD-SCNC: 141 MMOL/L (ref 136–145)
SP GR UR STRIP: 1.01 (ref 1–1.03)
SQUAMOUS #/AREA URNS HPF: ABNORMAL /HPF
UROBILINOGEN UR QL STRIP: ABNORMAL
WBC NRBC COR # BLD: 2.3 10*3/MM3 (ref 3.4–10.8)
WBC UR QL AUTO: ABNORMAL /HPF

## 2019-07-01 PROCEDURE — 25010000002 RITUXIMAB 10 MG/ML SOLUTION 50 ML VIAL: Performed by: NURSE PRACTITIONER

## 2019-07-01 PROCEDURE — 25010000002 CYCLOPHOSPHAMIDE PER 100 MG: Performed by: NURSE PRACTITIONER

## 2019-07-01 PROCEDURE — 25010000002 FOSAPREPITANT PER 1 MG: Performed by: NURSE PRACTITIONER

## 2019-07-01 PROCEDURE — 25010000002 RITUXIMAB 10 MG/ML SOLUTION 10 ML VIAL: Performed by: NURSE PRACTITIONER

## 2019-07-01 PROCEDURE — 96368 THER/DIAG CONCURRENT INF: CPT

## 2019-07-01 PROCEDURE — 96411 CHEMO IV PUSH ADDL DRUG: CPT

## 2019-07-01 PROCEDURE — 96377 APPLICATON ON-BODY INJECTOR: CPT

## 2019-07-01 PROCEDURE — 96549 UNLISTED CHEMOTHERAPY PX: CPT

## 2019-07-01 PROCEDURE — 25010000002 DIPHENHYDRAMINE PER 50 MG: Performed by: NURSE PRACTITIONER

## 2019-07-01 PROCEDURE — 96415 CHEMO IV INFUSION ADDL HR: CPT

## 2019-07-01 PROCEDURE — 96367 TX/PROPH/DG ADDL SEQ IV INF: CPT

## 2019-07-01 PROCEDURE — 25010000002 VINCRISTINE PER 1 MG: Performed by: NURSE PRACTITIONER

## 2019-07-01 PROCEDURE — 25010000002 DOXORUBICIN PER 10 MG: Performed by: NURSE PRACTITIONER

## 2019-07-01 PROCEDURE — 80053 COMPREHEN METABOLIC PANEL: CPT | Performed by: NURSE PRACTITIONER

## 2019-07-01 PROCEDURE — 96409 CHEMO IV PUSH SNGL DRUG: CPT

## 2019-07-01 PROCEDURE — 85025 COMPLETE CBC W/AUTO DIFF WBC: CPT | Performed by: NURSE PRACTITIONER

## 2019-07-01 PROCEDURE — 96417 CHEMO IV INFUS EACH ADDL SEQ: CPT

## 2019-07-01 PROCEDURE — 81001 URINALYSIS AUTO W/SCOPE: CPT | Performed by: NURSE PRACTITIONER

## 2019-07-01 PROCEDURE — 96375 TX/PRO/DX INJ NEW DRUG ADDON: CPT

## 2019-07-01 PROCEDURE — 25010000002 PALONOSETRON PER 25 MCG: Performed by: NURSE PRACTITIONER

## 2019-07-01 PROCEDURE — 25010000002 PEGFILGRASTIM 6 MG/0.6ML PREFILLED SYRINGE KIT: Performed by: NURSE PRACTITIONER

## 2019-07-01 PROCEDURE — 99214 OFFICE O/P EST MOD 30 MIN: CPT | Performed by: NURSE PRACTITIONER

## 2019-07-01 PROCEDURE — 25010000002 DEXAMETHASONE PER 1 MG: Performed by: NURSE PRACTITIONER

## 2019-07-01 PROCEDURE — 96413 CHEMO IV INFUSION 1 HR: CPT

## 2019-07-01 RX ORDER — DIPHENHYDRAMINE HYDROCHLORIDE 50 MG/ML
50 INJECTION INTRAMUSCULAR; INTRAVENOUS AS NEEDED
Status: CANCELLED | OUTPATIENT
Start: 2019-07-01

## 2019-07-01 RX ORDER — MEPERIDINE HYDROCHLORIDE 50 MG/ML
25 INJECTION INTRAMUSCULAR; INTRAVENOUS; SUBCUTANEOUS
Status: CANCELLED | OUTPATIENT
Start: 2019-07-01 | End: 2019-07-02

## 2019-07-01 RX ORDER — SODIUM CHLORIDE 9 MG/ML
250 INJECTION, SOLUTION INTRAVENOUS ONCE
Status: CANCELLED | OUTPATIENT
Start: 2019-07-01

## 2019-07-01 RX ORDER — PALONOSETRON 0.05 MG/ML
0.25 INJECTION, SOLUTION INTRAVENOUS ONCE
Status: COMPLETED | OUTPATIENT
Start: 2019-07-01 | End: 2019-07-01

## 2019-07-01 RX ORDER — CEFUROXIME AXETIL 250 MG/1
250 TABLET ORAL 2 TIMES DAILY
Qty: 14 TABLET | Refills: 0 | Status: SHIPPED | OUTPATIENT
Start: 2019-07-01 | End: 2019-08-17

## 2019-07-01 RX ORDER — DOXORUBICIN HYDROCHLORIDE 2 MG/ML
90 INJECTION, SOLUTION INTRAVENOUS ONCE
Status: COMPLETED | OUTPATIENT
Start: 2019-07-01 | End: 2019-07-01

## 2019-07-01 RX ORDER — ACETAMINOPHEN 325 MG/1
650 TABLET ORAL ONCE
Status: CANCELLED | OUTPATIENT
Start: 2019-07-01

## 2019-07-01 RX ORDER — ACETAMINOPHEN 325 MG/1
650 TABLET ORAL ONCE
Status: COMPLETED | OUTPATIENT
Start: 2019-07-01 | End: 2019-07-01

## 2019-07-01 RX ORDER — SODIUM CHLORIDE 9 MG/ML
250 INJECTION, SOLUTION INTRAVENOUS ONCE
Status: DISCONTINUED | OUTPATIENT
Start: 2019-07-01 | End: 2019-07-02 | Stop reason: HOSPADM

## 2019-07-01 RX ORDER — DOXORUBICIN HYDROCHLORIDE 2 MG/ML
50 INJECTION, SOLUTION INTRAVENOUS ONCE
Status: CANCELLED | OUTPATIENT
Start: 2019-07-01

## 2019-07-01 RX ORDER — FAMOTIDINE 10 MG/ML
20 INJECTION, SOLUTION INTRAVENOUS AS NEEDED
Status: CANCELLED | OUTPATIENT
Start: 2019-07-01

## 2019-07-01 RX ORDER — PALONOSETRON 0.05 MG/ML
0.25 INJECTION, SOLUTION INTRAVENOUS ONCE
Status: CANCELLED | OUTPATIENT
Start: 2019-07-01

## 2019-07-01 RX ORDER — SODIUM CHLORIDE 0.9 % (FLUSH) 0.9 %
10 SYRINGE (ML) INJECTION AS NEEDED
Status: CANCELLED | OUTPATIENT
Start: 2019-07-01

## 2019-07-01 RX ORDER — SODIUM CHLORIDE 0.9 % (FLUSH) 0.9 %
10 SYRINGE (ML) INJECTION AS NEEDED
Status: DISCONTINUED | OUTPATIENT
Start: 2019-07-01 | End: 2019-07-02 | Stop reason: HOSPADM

## 2019-07-01 RX ADMIN — PALONOSETRON HYDROCHLORIDE 0.25 MG: 0.25 INJECTION, SOLUTION INTRAVENOUS at 13:00

## 2019-07-01 RX ADMIN — DEXAMETHASONE SODIUM PHOSPHATE 12 MG: 4 INJECTION, SOLUTION INTRAMUSCULAR; INTRAVENOUS at 13:05

## 2019-07-01 RX ADMIN — HEPARIN 500 UNITS: 100 SYRINGE at 14:35

## 2019-07-01 RX ADMIN — PEGFILGRASTIM 6 MG: KIT SUBCUTANEOUS at 14:40

## 2019-07-01 RX ADMIN — RITUXIMAB 600 MG: 10 INJECTION, SOLUTION INTRAVENOUS at 10:36

## 2019-07-01 RX ADMIN — DOXORUBICIN HYDROCHLORIDE 90 MG: 2 INJECTION, SOLUTION INTRAVENOUS at 13:35

## 2019-07-01 RX ADMIN — SODIUM CHLORIDE 150 MG: 9 INJECTION, SOLUTION INTRAVENOUS at 13:05

## 2019-07-01 RX ADMIN — CYCLOPHOSPHAMIDE 1320 MG: 1 INJECTION, POWDER, FOR SOLUTION INTRAVENOUS; ORAL at 14:00

## 2019-07-01 RX ADMIN — DIPHENHYDRAMINE HYDROCHLORIDE 50 MG: 50 INJECTION INTRAMUSCULAR; INTRAVENOUS at 10:14

## 2019-07-01 RX ADMIN — VINCRISTINE SULFATE 2 MG: 1 INJECTION, SOLUTION INTRAVENOUS at 13:46

## 2019-07-01 RX ADMIN — ACETAMINOPHEN 650 MG: 325 TABLET, FILM COATED ORAL at 10:14

## 2019-07-01 NOTE — PROGRESS NOTES
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  Staging workup revealed stage IIE disease.  Patient was started on definitive chemotherapy with R CHOP April 8, 2019. The patient is here today for cycle #5.    SUBJECTIVE: The patient is here today with her . She has been doing fairly well. She tolerated her last cycle of chemotherapy with mild side effects. She has noticed worsening urinary frequency. This is a problem for her on a regular basis and she take oxybutynin that helps, but after her last treatment it worsened. She denies burning or hematuria, however she has noticed a foul smell to her urine at times. She has had no fevers or chills. She is eating and drinking well.    PAST MEDICAL HISTORY/SOCIAL HISTORY/FAMILY HISTORY: Reviewed by me and unchanged from my documentation done on 07/01/19.    Review of Systems   Constitutional: Positive for fatigue. Negative for activity change, appetite change, chills, fever and unexpected weight change.   HENT: Negative for hearing loss, mouth sores, nosebleeds, sore throat and trouble swallowing.    Eyes: Negative for visual disturbance.   Respiratory: Negative for cough, chest tightness, shortness of breath and wheezing.    Cardiovascular: Negative for chest pain, palpitations and leg swelling.   Gastrointestinal: Negative for abdominal distention, abdominal pain, blood in stool, constipation, diarrhea, nausea, rectal pain and vomiting.   Endocrine: Negative for cold intolerance and heat intolerance.   Genitourinary: Positive for frequency. Negative for difficulty urinating, dysuria and urgency.   Musculoskeletal: Negative for arthralgias, back pain, gait problem, joint swelling and myalgias.   Skin: Negative for rash.   Neurological:  Negative for dizziness, tremors, syncope, weakness, light-headedness, numbness and headaches.   Hematological: Negative for adenopathy. Does not bruise/bleed easily.   Psychiatric/Behavioral: Negative for confusion, sleep disturbance and suicidal ideas. The patient is not nervous/anxious.          Current Outpatient Medications:   •  CALCIUM PO, Take 600 mg by mouth Daily., Disp: , Rfl:   •  lidocaine-prilocaine (EMLA) 2.5-2.5 % cream, Apply  topically to the appropriate area as directed As Needed (45-60 minutes prior to port access.  Cover with saran/plastic wrap.). (Patient taking differently: Apply 1 application topically to the appropriate area as directed As Needed (45-60 minutes prior to port access.  Cover with saran/plastic wrap.).), Disp: 30 g, Rfl: 3  •  meloxicam (MOBIC) 7.5 MG tablet, Take 1 tablet by mouth Daily., Disp: 30 tablet, Rfl: 0  •  Multiple Vitamins-Minerals (MULTIVITAMIN ADULT PO), Take 1 tablet by mouth Daily., Disp: , Rfl:   •  omeprazole (priLOSEC) 20 MG capsule, Take 1 capsule by mouth Daily. At bedtime, Disp: 90 capsule, Rfl: 3  •  ondansetron (ZOFRAN) 8 MG tablet, Take 1 tablet by mouth 3 (Three) Times a Day As Needed for Nausea or Vomiting., Disp: 30 tablet, Rfl: 5  •  oxybutynin XL (DITROPAN-XL) 10 MG 24 hr tablet, TAKE 1 TABLET EVERY DAY, Disp: 90 tablet, Rfl: 1  •  predniSONE (DELTASONE) 50 MG tablet, Take 50 mg by mouth Daily. As directed., Disp: , Rfl:     PHYSICAL EXAMINATION:   There were no vitals taken for this visit.   ECOG Performance Status: 1 - Symptomatic but completely ambulatory  General Appearance:  alert, cooperative, no apparent distress and appears stated age   Neurologic/Psychiatric: A&O x 3, gait steady, appropriate affect, strength 5/5 in all muscle groups   HEENT:  Normocephalic, without obvious abnormality, mucous membranes moist   Neck: Supple, symmetrical, trachea midline, no adenopathy;  No thyromegaly, masses, or tenderness   Lungs:   Clear to auscultation  bilaterally; respirations regular, even, and unlabored bilaterally   Heart:  Regular rate and rhythm, no murmurs appreciated   Abdomen:   Soft, non-tender, non-distended and no organomegaly   Lymph nodes: No cervical, supraclavicular, inguinal or axillary adenopathy noted   Extremities: Normal, atraumatic; no clubbing, cyanosis, or edema    Skin: No rashes, ulcers, or suspicious lesions noted     No visits with results within 2 Week(s) from this visit.   Latest known visit with results is:   Hospital Outpatient Visit on 06/10/2019   Component Date Value Ref Range Status   • Glucose 06/10/2019 89  65 - 99 mg/dL Final   • BUN 06/10/2019 17  8 - 23 mg/dL Final   • Creatinine 06/10/2019 0.64  0.57 - 1.00 mg/dL Final   • Sodium 06/10/2019 142  136 - 145 mmol/L Final   • Potassium 06/10/2019 4.1  3.5 - 5.2 mmol/L Final   • Chloride 06/10/2019 107  98 - 107 mmol/L Final   • CO2 06/10/2019 23.0  22.0 - 29.0 mmol/L Final   • Calcium 06/10/2019 9.3  8.6 - 10.5 mg/dL Final   • Total Protein 06/10/2019 6.5  6.0 - 8.5 g/dL Final   • Albumin 06/10/2019 3.90  3.50 - 5.20 g/dL Final   • ALT (SGPT) 06/10/2019 12  1 - 33 U/L Final   • AST (SGOT) 06/10/2019 18  1 - 32 U/L Final   • Alkaline Phosphatase 06/10/2019 52  39 - 117 U/L Final   • Total Bilirubin 06/10/2019 0.4  0.2 - 1.2 mg/dL Final   • eGFR Non African Amer 06/10/2019 92  >60 mL/min/1.73 Final   • Globulin 06/10/2019 2.6  gm/dL Final   • A/G Ratio 06/10/2019 1.5  g/dL Final   • BUN/Creatinine Ratio 06/10/2019 26.6* 7.0 - 25.0 Final   • Anion Gap 06/10/2019 12.0  mmol/L Final   • WBC 06/10/2019 3.00* 3.40 - 10.80 10*3/mm3 Final   • RBC 06/10/2019 3.07* 3.77 - 5.28 10*6/mm3 Final   • Hemoglobin 06/10/2019 9.5* 12.0 - 15.9 g/dL Final   • Hematocrit 06/10/2019 28.5* 34.0 - 46.6 % Final   • RDW 06/10/2019 18.1* 12.3 - 15.4 % Final   • MCV 06/10/2019 92.8  79.0 - 97.0 fL Final   • MCH 06/10/2019 30.9  26.6 - 33.0 pg Final   • MCHC 06/10/2019 33.3  31.5 - 35.7 g/dL Final   • MPV  06/10/2019 7.0  6.0 - 12.0 fL Final   • Platelets 06/10/2019 339  140 - 450 10*3/mm3 Final   • Neutrophil % 06/10/2019 70.4  42.7 - 76.0 % Final   • Lymphocyte % 06/10/2019 18.6* 19.6 - 45.3 % Final   • Monocyte % 06/10/2019 11.0  5.0 - 12.0 % Final   • Neutrophils, Absolute 06/10/2019 2.10  1.70 - 7.00 10*3/mm3 Final   • Lymphocytes, Absolute 06/10/2019 0.60* 0.70 - 3.10 10*3/mm3 Final   • Monocytes, Absolute 06/10/2019 0.30  0.10 - 0.90 10*3/mm3 Final        Ct Outside Films    Result Date: 6/10/2019  Narrative: This procedure was auto-finalized with no dictation required.      ASSESSMENT: The patient is a very pleasant 69 y.o. female  with mandible diffuse large B-cell lymphoma.    PROBLEM LIST:  1. Mandible diffuse large B-cell lymphoma, stage IIE:  A.  Presented with gum pain and swelling  B.  CT facial bones done on February 26, 2019 revealed 2.3 cm lucency in the anterior mandible  C.  Status post biopsy done on March 7, 2019 consistent with diffuse large B-cell lymphoma  D.  Bone marrow biopsy done on March 25, 2019 no evidence of lymphoma involvement.  E.  Whole body PET scan done on March 28, 2019 revealed hypermetabolic activity in the right curtis-mandible as well as jugular cervical chains hypermetabolic lymphadenopathy level 2 and level 3.  F. Started chemotegrapy with R-CHOP April 2019,status post 4 cycles.  2.  Osteoarthritis  3.  Chemotherapy-induced nausea  4.  Deep venous catheter  5.  Chemotherapy-induced constipation  6.  Thrombocytosis, reactive  7. Urinary frequency    PLAN:  1.  I will proceed with chemotherapy as scheduled today R CHOP cycle #5.  2.  The patient will follow up with us in 3 weeks cycle #6 out of 6 planned.  3.  I will monitor the patient blood work including blood counts, kidney function, liver function, and electrolytes. We will also check UA today secondary to urinary complaints.   4. We will repeat the patient's CAT scans after cycle #6.   5.  We reviewed again the potential  risks and side effects of R CHOP chemotherapy including neutropenia, alopecia, nausea and vomiting, fatigue, neuropathy, cardiomyopathy, constipation, infusion reaction, and a small risk of myelodysplasia.  6.  I will continue the patient on Zofran as needed for chemotherapy-induced nausea.  7.  We will continue routine port care with use of EMLA cream prior to access.   8.  We will continue Mobic as needed for osteoarthritis.  We will continue to monitor her platelets as she may be at a higher risk for bleeding while on treatment.   9. We will continue bowel regimen for constipation.  10. I encouraged the patient to continue to drink plenty of fluids at home for hydration as well as bladder irritation. She will also continue oxybutynin 10 mg for over active bladder.     Migdalia Blanca, APRN  7/1/2019

## 2019-07-08 ENCOUNTER — TELEPHONE (OUTPATIENT)
Dept: ONCOLOGY | Facility: CLINIC | Age: 69
End: 2019-07-08

## 2019-07-08 ENCOUNTER — DOCUMENTATION (OUTPATIENT)
Dept: ONCOLOGY | Facility: CLINIC | Age: 69
End: 2019-07-08

## 2019-07-08 ENCOUNTER — LAB (OUTPATIENT)
Dept: LAB | Facility: HOSPITAL | Age: 69
End: 2019-07-08

## 2019-07-08 DIAGNOSIS — R50.9 LOW GRADE FEVER: ICD-10-CM

## 2019-07-08 DIAGNOSIS — C83.31 DIFFUSE LARGE B-CELL LYMPHOMA OF LYMPH NODES OF HEAD (HCC): Primary | ICD-10-CM

## 2019-07-08 DIAGNOSIS — C83.31 DIFFUSE LARGE B-CELL LYMPHOMA OF LYMPH NODES OF HEAD (HCC): ICD-10-CM

## 2019-07-08 LAB
BACTERIA UR QL AUTO: ABNORMAL /HPF
BASOPHILS # BLD AUTO: 0.01 10*3/MM3 (ref 0–0.2)
BASOPHILS NFR BLD AUTO: 3.8 % (ref 0–1.5)
BILIRUB UR QL STRIP: NEGATIVE
CLARITY UR: ABNORMAL
COLOR UR: ABNORMAL
DEPRECATED RDW RBC AUTO: 50.3 FL (ref 37–54)
EOSINOPHIL # BLD AUTO: 0.03 10*3/MM3 (ref 0–0.4)
EOSINOPHIL NFR BLD AUTO: 11.5 % (ref 0.3–6.2)
ERYTHROCYTE [DISTWIDTH] IN BLOOD BY AUTOMATED COUNT: 13.8 % (ref 12.3–15.4)
GLUCOSE UR STRIP-MCNC: NEGATIVE MG/DL
HCT VFR BLD AUTO: 25 % (ref 34–46.6)
HGB BLD-MCNC: 8.4 G/DL (ref 12–15.9)
HGB UR QL STRIP.AUTO: NEGATIVE
HYALINE CASTS UR QL AUTO: ABNORMAL /LPF
IMM GRANULOCYTES # BLD AUTO: 0.01 10*3/MM3 (ref 0–0.05)
IMM GRANULOCYTES NFR BLD AUTO: 3.8 % (ref 0–0.5)
KETONES UR QL STRIP: ABNORMAL
LEUKOCYTE ESTERASE UR QL STRIP.AUTO: ABNORMAL
LYMPHOCYTES # BLD AUTO: 0.15 10*3/MM3 (ref 0.7–3.1)
LYMPHOCYTES NFR BLD AUTO: 57.7 % (ref 19.6–45.3)
MCH RBC QN AUTO: 33.1 PG (ref 26.6–33)
MCHC RBC AUTO-ENTMCNC: 33.6 G/DL (ref 31.5–35.7)
MCV RBC AUTO: 98.4 FL (ref 79–97)
MONOCYTES # BLD AUTO: 0.04 10*3/MM3 (ref 0.1–0.9)
MONOCYTES NFR BLD AUTO: 15.4 % (ref 5–12)
NEUTROPHILS # BLD AUTO: 0.02 10*3/MM3 (ref 1.7–7)
NEUTROPHILS NFR BLD AUTO: 7.8 % (ref 42.7–76)
NITRITE UR QL STRIP: NEGATIVE
NRBC BLD AUTO-RTO: 0 /100 WBC (ref 0–0.2)
PH UR STRIP.AUTO: 7.5 [PH] (ref 5–8)
PLAT MORPH BLD: NORMAL
PLATELET # BLD AUTO: 81 10*3/MM3 (ref 140–450)
PMV BLD AUTO: 10.6 FL (ref 6–12)
PROT UR QL STRIP: NEGATIVE
RBC # BLD AUTO: 2.54 10*6/MM3 (ref 3.77–5.28)
RBC # UR: ABNORMAL /HPF
RBC MORPH BLD: NORMAL
REF LAB TEST METHOD: ABNORMAL
SP GR UR STRIP: 1.02 (ref 1–1.03)
SQUAMOUS #/AREA URNS HPF: ABNORMAL /HPF
UROBILINOGEN UR QL STRIP: ABNORMAL
WBC MORPH BLD: NORMAL
WBC NRBC COR # BLD: 0.26 10*3/MM3 (ref 3.4–10.8)
WBC UR QL AUTO: ABNORMAL /HPF

## 2019-07-08 PROCEDURE — 87186 SC STD MICRODIL/AGAR DIL: CPT

## 2019-07-08 PROCEDURE — 85007 BL SMEAR W/DIFF WBC COUNT: CPT

## 2019-07-08 PROCEDURE — 87086 URINE CULTURE/COLONY COUNT: CPT

## 2019-07-08 PROCEDURE — 85025 COMPLETE CBC W/AUTO DIFF WBC: CPT

## 2019-07-08 PROCEDURE — 81001 URINALYSIS AUTO W/SCOPE: CPT

## 2019-07-08 PROCEDURE — 36415 COLL VENOUS BLD VENIPUNCTURE: CPT

## 2019-07-08 PROCEDURE — 87077 CULTURE AEROBIC IDENTIFY: CPT

## 2019-07-08 RX ORDER — LEVOFLOXACIN 750 MG/1
750 TABLET ORAL DAILY
Qty: 7 TABLET | Refills: 0 | Status: SHIPPED | OUTPATIENT
Start: 2019-07-08 | End: 2019-08-17

## 2019-07-08 RX ORDER — AZITHROMYCIN 250 MG/1
TABLET, FILM COATED ORAL
Qty: 6 TABLET | Refills: 0 | Status: SHIPPED | OUTPATIENT
Start: 2019-07-08 | End: 2019-08-17

## 2019-07-08 NOTE — TELEPHONE ENCOUNTER
Spoke with patient regarding her critically low WBC count. Patient informed that after discussion w/ Dr. Zuluaga and ANGELA Jansen it was determined that patient should begin Zpac due to her hx of fever and low WBC's. Patient educated on neutropenic precautions. Patient verbalized understanding and will  RX through drive through today. Patient to call back if fever worsens or if other S/S of infection develop.

## 2019-07-08 NOTE — TELEPHONE ENCOUNTER
Patient called triage line stating that she has just finished an oral antibiotic for a UTI and was still experiencing a low grade fever with chills. After discussion with ANGELA Jansen patient to come in and have labs drawn, CBC and urinalysis w/ culture. Patient informed of plan of care and is in agreement. Verbalized understanding.

## 2019-07-08 NOTE — PROGRESS NOTES
Labs showed WBC 0.26, hemoglobin 8.4, hematocrit 25.0, and platelets 81.  Urine with 2+ bacteria, culture pending.  After reviewing labs, decision was made to add Levaquin to treatment regimen for neutropenic fever.   I called patient and she reports low-grade fever at 100.  She denies nausea, vomiting, shortness of breath, chest pain, urinary symptoms, dizziness or other acute symptoms.  I will call patient in the morning to follow-up.  Otherwise instructed her if any change in symptoms or if worsening of symptoms then she would need to go to ED. Patient verbalized understanding.

## 2019-07-08 NOTE — TELEPHONE ENCOUNTER
Left VM for patient regarding today's lab results and RX for antibiotic. No answer, left VM for patient to return call.

## 2019-07-09 ENCOUNTER — TELEPHONE (OUTPATIENT)
Dept: ONCOLOGY | Facility: CLINIC | Age: 69
End: 2019-07-09

## 2019-07-09 DIAGNOSIS — R50.9 LOW GRADE FEVER: Primary | ICD-10-CM

## 2019-07-09 DIAGNOSIS — R35.0 URINARY FREQUENCY: ICD-10-CM

## 2019-07-09 NOTE — TELEPHONE ENCOUNTER
Called to follow up with patient.  She said she feels a lot better.  She slept well last night and has been drinking plenty of fluids. No new symptoms.  She has been taking antibiotics as prescribed.  She will notify us if any changes, otherwise she is scheduled to see ANGELA Lilly on 7/22/2019.  Urine result positive for 2+ bacteria.  Added on a culture.

## 2019-07-11 LAB — BACTERIA SPEC AEROBE CULT: ABNORMAL

## 2019-07-22 ENCOUNTER — OFFICE VISIT (OUTPATIENT)
Dept: ONCOLOGY | Facility: CLINIC | Age: 69
End: 2019-07-22

## 2019-07-22 ENCOUNTER — HOSPITAL ENCOUNTER (OUTPATIENT)
Dept: ONCOLOGY | Facility: HOSPITAL | Age: 69
Setting detail: INFUSION SERIES
Discharge: HOME | End: 2019-07-22

## 2019-07-22 VITALS
BODY MASS INDEX: 26.75 KG/M2 | HEIGHT: 63 IN | RESPIRATION RATE: 16 BRPM | OXYGEN SATURATION: 100 % | HEART RATE: 96 BPM | TEMPERATURE: 97.7 F | WEIGHT: 151 LBS | SYSTOLIC BLOOD PRESSURE: 118 MMHG | DIASTOLIC BLOOD PRESSURE: 59 MMHG

## 2019-07-22 VITALS — DIASTOLIC BLOOD PRESSURE: 52 MMHG | HEART RATE: 95 BPM | SYSTOLIC BLOOD PRESSURE: 116 MMHG

## 2019-07-22 DIAGNOSIS — C83.31 DIFFUSE LARGE B-CELL LYMPHOMA OF LYMPH NODES OF HEAD (HCC): Primary | ICD-10-CM

## 2019-07-22 LAB
ALBUMIN SERPL-MCNC: 3.8 G/DL (ref 3.5–5.2)
ALBUMIN/GLOB SERPL: 1.5 G/DL
ALP SERPL-CCNC: 45 U/L (ref 39–117)
ALT SERPL W P-5'-P-CCNC: 15 U/L (ref 1–33)
ANION GAP SERPL CALCULATED.3IONS-SCNC: 9 MMOL/L (ref 5–15)
AST SERPL-CCNC: 31 U/L (ref 1–32)
BILIRUB SERPL-MCNC: 0.4 MG/DL (ref 0.2–1.2)
BUN BLD-MCNC: 8 MG/DL (ref 8–23)
BUN/CREAT SERPL: 15.4 (ref 7–25)
CALCIUM SPEC-SCNC: 9 MG/DL (ref 8.6–10.5)
CHLORIDE SERPL-SCNC: 109 MMOL/L (ref 98–107)
CO2 SERPL-SCNC: 24 MMOL/L (ref 22–29)
CREAT BLD-MCNC: 0.52 MG/DL (ref 0.57–1)
CREAT BLDA-MCNC: 0.5 MG/DL (ref 0.6–1.3)
CREAT SERPL-MCNC: 0.5 MG/DL
ERYTHROCYTE [DISTWIDTH] IN BLOOD BY AUTOMATED COUNT: 15.6 % (ref 12.3–15.4)
GFR SERPL CREATININE-BSD FRML MDRD: 117 ML/MIN/1.73
GLOBULIN UR ELPH-MCNC: 2.5 GM/DL
GLUCOSE BLD-MCNC: 75 MG/DL (ref 65–99)
HCT VFR BLD AUTO: 24.6 % (ref 34–46.6)
HGB BLD-MCNC: 8.4 G/DL (ref 12–15.9)
LYMPHOCYTES # BLD AUTO: 0.5 10*3/MM3 (ref 0.7–3.1)
LYMPHOCYTES NFR BLD AUTO: 21.7 % (ref 19.6–45.3)
MCH RBC QN AUTO: 33.2 PG (ref 26.6–33)
MCHC RBC AUTO-ENTMCNC: 34 G/DL (ref 31.5–35.7)
MCV RBC AUTO: 97.5 FL (ref 79–97)
MONOCYTES # BLD AUTO: 0.3 10*3/MM3 (ref 0.1–0.9)
MONOCYTES NFR BLD AUTO: 14 % (ref 5–12)
NEUTROPHILS # BLD AUTO: 1.4 10*3/MM3 (ref 1.7–7)
NEUTROPHILS NFR BLD AUTO: 64.3 % (ref 42.7–76)
PLATELET # BLD AUTO: 314 10*3/MM3 (ref 140–450)
PMV BLD AUTO: 6.1 FL (ref 6–12)
POTASSIUM BLD-SCNC: 3.8 MMOL/L (ref 3.5–5.2)
PROT SERPL-MCNC: 6.3 G/DL (ref 6–8.5)
RBC # BLD AUTO: 2.52 10*6/MM3 (ref 3.77–5.28)
SODIUM BLD-SCNC: 142 MMOL/L (ref 136–145)
WBC NRBC COR # BLD: 2.1 10*3/MM3 (ref 3.4–10.8)

## 2019-07-22 PROCEDURE — 82565 ASSAY OF CREATININE: CPT

## 2019-07-22 PROCEDURE — 96368 THER/DIAG CONCURRENT INF: CPT

## 2019-07-22 PROCEDURE — 25010000002 PEGFILGRASTIM 6 MG/0.6ML PREFILLED SYRINGE KIT: Performed by: NURSE PRACTITIONER

## 2019-07-22 PROCEDURE — 25010000002 RITUXIMAB 10 MG/ML SOLUTION 10 ML VIAL: Performed by: NURSE PRACTITIONER

## 2019-07-22 PROCEDURE — 25010000002 RITUXIMAB 10 MG/ML SOLUTION 50 ML VIAL: Performed by: NURSE PRACTITIONER

## 2019-07-22 PROCEDURE — 96377 APPLICATON ON-BODY INJECTOR: CPT

## 2019-07-22 PROCEDURE — 96415 CHEMO IV INFUSION ADDL HR: CPT

## 2019-07-22 PROCEDURE — 96413 CHEMO IV INFUSION 1 HR: CPT

## 2019-07-22 PROCEDURE — 25010000002 CYCLOPHOSPHAMIDE PER 100 MG: Performed by: NURSE PRACTITIONER

## 2019-07-22 PROCEDURE — 25010000002 VINCRISTINE PER 1 MG: Performed by: NURSE PRACTITIONER

## 2019-07-22 PROCEDURE — 80053 COMPREHEN METABOLIC PANEL: CPT | Performed by: NURSE PRACTITIONER

## 2019-07-22 PROCEDURE — 99214 OFFICE O/P EST MOD 30 MIN: CPT | Performed by: NURSE PRACTITIONER

## 2019-07-22 PROCEDURE — 96367 TX/PROPH/DG ADDL SEQ IV INF: CPT

## 2019-07-22 PROCEDURE — 25010000002 DIPHENHYDRAMINE PER 50 MG: Performed by: NURSE PRACTITIONER

## 2019-07-22 PROCEDURE — 25010000002 DOXORUBICIN PER 10 MG: Performed by: NURSE PRACTITIONER

## 2019-07-22 PROCEDURE — 85025 COMPLETE CBC W/AUTO DIFF WBC: CPT | Performed by: NURSE PRACTITIONER

## 2019-07-22 PROCEDURE — 25010000002 FOSAPREPITANT PER 1 MG: Performed by: NURSE PRACTITIONER

## 2019-07-22 PROCEDURE — 25010000002 PALONOSETRON 0.25 MG/5ML SOLUTION PREFILLED SYRINGE: Performed by: NURSE PRACTITIONER

## 2019-07-22 PROCEDURE — 96417 CHEMO IV INFUS EACH ADDL SEQ: CPT

## 2019-07-22 PROCEDURE — 96375 TX/PRO/DX INJ NEW DRUG ADDON: CPT

## 2019-07-22 PROCEDURE — 96409 CHEMO IV PUSH SNGL DRUG: CPT

## 2019-07-22 PROCEDURE — 96411 CHEMO IV PUSH ADDL DRUG: CPT

## 2019-07-22 PROCEDURE — 25010000002 DEXAMETHASONE PER 1 MG: Performed by: NURSE PRACTITIONER

## 2019-07-22 RX ORDER — DOXORUBICIN HYDROCHLORIDE 2 MG/ML
90 INJECTION, SOLUTION INTRAVENOUS ONCE
Status: COMPLETED | OUTPATIENT
Start: 2019-07-22 | End: 2019-07-22

## 2019-07-22 RX ORDER — SODIUM CHLORIDE 0.9 % (FLUSH) 0.9 %
10 SYRINGE (ML) INJECTION AS NEEDED
Status: DISCONTINUED | OUTPATIENT
Start: 2019-07-22 | End: 2019-07-23 | Stop reason: HOSPADM

## 2019-07-22 RX ORDER — PALONOSETRON 0.05 MG/ML
0.25 INJECTION, SOLUTION INTRAVENOUS ONCE
Status: COMPLETED | OUTPATIENT
Start: 2019-07-22 | End: 2019-07-22

## 2019-07-22 RX ORDER — MEPERIDINE HYDROCHLORIDE 50 MG/ML
25 INJECTION INTRAMUSCULAR; INTRAVENOUS; SUBCUTANEOUS
Status: CANCELLED | OUTPATIENT
Start: 2019-07-22 | End: 2019-07-23

## 2019-07-22 RX ORDER — DOXORUBICIN HYDROCHLORIDE 2 MG/ML
50 INJECTION, SOLUTION INTRAVENOUS ONCE
Status: CANCELLED | OUTPATIENT
Start: 2019-07-22

## 2019-07-22 RX ORDER — SODIUM CHLORIDE 9 MG/ML
250 INJECTION, SOLUTION INTRAVENOUS ONCE
Status: DISCONTINUED | OUTPATIENT
Start: 2019-07-22 | End: 2019-07-23 | Stop reason: HOSPADM

## 2019-07-22 RX ORDER — SODIUM CHLORIDE 0.9 % (FLUSH) 0.9 %
10 SYRINGE (ML) INJECTION AS NEEDED
Status: CANCELLED | OUTPATIENT
Start: 2019-07-22

## 2019-07-22 RX ORDER — DIPHENHYDRAMINE HYDROCHLORIDE 50 MG/ML
50 INJECTION INTRAMUSCULAR; INTRAVENOUS AS NEEDED
Status: CANCELLED | OUTPATIENT
Start: 2019-07-22

## 2019-07-22 RX ORDER — SODIUM CHLORIDE 9 MG/ML
250 INJECTION, SOLUTION INTRAVENOUS ONCE
Status: CANCELLED | OUTPATIENT
Start: 2019-07-22

## 2019-07-22 RX ORDER — FAMOTIDINE 10 MG/ML
20 INJECTION, SOLUTION INTRAVENOUS AS NEEDED
Status: CANCELLED | OUTPATIENT
Start: 2019-07-22

## 2019-07-22 RX ORDER — ACETAMINOPHEN 325 MG/1
650 TABLET ORAL ONCE
Status: CANCELLED | OUTPATIENT
Start: 2019-07-22

## 2019-07-22 RX ORDER — ACETAMINOPHEN 325 MG/1
650 TABLET ORAL ONCE
Status: COMPLETED | OUTPATIENT
Start: 2019-07-22 | End: 2019-07-22

## 2019-07-22 RX ORDER — PALONOSETRON 0.05 MG/ML
0.25 INJECTION, SOLUTION INTRAVENOUS ONCE
Status: CANCELLED | OUTPATIENT
Start: 2019-07-22

## 2019-07-22 RX ADMIN — DOXORUBICIN HYDROCHLORIDE 90 MG: 2 INJECTION, SOLUTION INTRAVENOUS at 13:14

## 2019-07-22 RX ADMIN — PALONOSETRON 0.25 MG: 0.25 INJECTION, SOLUTION INTRAVENOUS at 12:41

## 2019-07-22 RX ADMIN — PEGFILGRASTIM 6 MG: KIT SUBCUTANEOUS at 14:21

## 2019-07-22 RX ADMIN — DEXAMETHASONE SODIUM PHOSPHATE 12 MG: 4 INJECTION, SOLUTION INTRAMUSCULAR; INTRAVENOUS at 12:44

## 2019-07-22 RX ADMIN — DIPHENHYDRAMINE HYDROCHLORIDE 50 MG: 50 INJECTION INTRAMUSCULAR; INTRAVENOUS at 09:51

## 2019-07-22 RX ADMIN — HEPARIN 500 UNITS: 100 SYRINGE at 14:20

## 2019-07-22 RX ADMIN — CYCLOPHOSPHAMIDE 1320 MG: 1 INJECTION, POWDER, FOR SOLUTION INTRAVENOUS; ORAL at 13:42

## 2019-07-22 RX ADMIN — ACETAMINOPHEN 650 MG: 325 TABLET, FILM COATED ORAL at 09:51

## 2019-07-22 RX ADMIN — VINCRISTINE SULFATE 2 MG: 1 INJECTION, SOLUTION INTRAVENOUS at 13:25

## 2019-07-22 RX ADMIN — RITUXIMAB 600 MG: 10 INJECTION, SOLUTION INTRAVENOUS at 10:15

## 2019-07-22 RX ADMIN — SODIUM CHLORIDE 150 MG: 9 INJECTION, SOLUTION INTRAVENOUS at 12:44

## 2019-07-22 NOTE — PROGRESS NOTES
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  Staging workup revealed stage IIE disease.  Patient was started on definitive chemotherapy with R CHOP April 8, 2019. The patient is here today for cycle #6.    SUBJECTIVE: The patient is here today with her . She has been doing fairly well. She complains of feeling more tired. She completed her antibiotics and has had no further fever or chills. Her urinary symptoms have improved. She has been eating and drinking well. She denies nausea or vomiting. She has had no constipation or diarrhea. She is anxious to be finishing her chemotherapy course.     PAST MEDICAL HISTORY/SOCIAL HISTORY/FAMILY HISTORY: Reviewed by me and unchanged from my documentation done on 07/22/19.    Review of Systems   Constitutional: Positive for fatigue. Negative for activity change, appetite change, chills, fever and unexpected weight change.   HENT: Negative for hearing loss, mouth sores, nosebleeds, sore throat and trouble swallowing.    Eyes: Negative for visual disturbance.   Respiratory: Negative for cough, chest tightness, shortness of breath and wheezing.    Cardiovascular: Negative for chest pain, palpitations and leg swelling.   Gastrointestinal: Negative for abdominal distention, abdominal pain, blood in stool, constipation, diarrhea, nausea, rectal pain and vomiting.   Endocrine: Negative for cold intolerance and heat intolerance.   Genitourinary: Positive for frequency. Negative for difficulty urinating, dysuria and urgency.   Musculoskeletal: Negative for arthralgias, back pain, gait problem, joint swelling and myalgias.   Skin: Negative for rash.   Neurological: Negative for dizziness, tremors, syncope, weakness, light-headedness, numbness and  "headaches.   Hematological: Negative for adenopathy. Does not bruise/bleed easily.   Psychiatric/Behavioral: Negative for confusion, sleep disturbance and suicidal ideas. The patient is not nervous/anxious.          Current Outpatient Medications:   •  azithromycin (ZITHROMAX) 250 MG tablet, Take 2 tablets the first day, then 1 tablet daily for 4 days., Disp: 6 tablet, Rfl: 0  •  CALCIUM PO, Take 600 mg by mouth Daily., Disp: , Rfl:   •  cefuroxime (CEFTIN) 250 MG tablet, Take 1 tablet by mouth 2 (Two) Times a Day., Disp: 14 tablet, Rfl: 0  •  levoFLOXacin (LEVAQUIN) 750 MG tablet, Take 1 tablet by mouth Daily., Disp: 7 tablet, Rfl: 0  •  lidocaine-prilocaine (EMLA) 2.5-2.5 % cream, Apply  topically to the appropriate area as directed As Needed (45-60 minutes prior to port access.  Cover with saran/plastic wrap.). (Patient taking differently: Apply 1 application topically to the appropriate area as directed As Needed (45-60 minutes prior to port access.  Cover with saran/plastic wrap.).), Disp: 30 g, Rfl: 3  •  meloxicam (MOBIC) 7.5 MG tablet, Take 1 tablet by mouth Daily., Disp: 30 tablet, Rfl: 0  •  Multiple Vitamins-Minerals (MULTIVITAMIN ADULT PO), Take 1 tablet by mouth Daily., Disp: , Rfl:   •  omeprazole (priLOSEC) 20 MG capsule, Take 1 capsule by mouth Daily. At bedtime, Disp: 90 capsule, Rfl: 3  •  ondansetron (ZOFRAN) 8 MG tablet, Take 1 tablet by mouth 3 (Three) Times a Day As Needed for Nausea or Vomiting., Disp: 30 tablet, Rfl: 5  •  oxybutynin XL (DITROPAN-XL) 10 MG 24 hr tablet, TAKE 1 TABLET EVERY DAY, Disp: 90 tablet, Rfl: 1  •  predniSONE (DELTASONE) 50 MG tablet, Take 50 mg by mouth Daily. As directed., Disp: , Rfl:     PHYSICAL EXAMINATION:   /59 Comment: LUE  Pulse 96   Temp 97.7 °F (36.5 °C) (Temporal)   Resp 16   Ht 160 cm (63\")   Wt 68.5 kg (151 lb)   SpO2 100% Comment: RA  BMI 26.75 kg/m²    ECOG Performance Status: 1 - Symptomatic but completely ambulatory  General Appearance:  " alert, cooperative, no apparent distress and appears stated age   Neurologic/Psychiatric: A&O x 3, gait steady, appropriate affect, strength 5/5 in all muscle groups   HEENT:  Normocephalic, without obvious abnormality, mucous membranes moist   Neck: Supple, symmetrical, trachea midline, no adenopathy;  No thyromegaly, masses, or tenderness   Lungs:   Clear to auscultation bilaterally; respirations regular, even, and unlabored bilaterally   Heart:  Regular rate and rhythm, no murmurs appreciated   Abdomen:   Soft, non-tender, non-distended and no organomegaly   Lymph nodes: No cervical, supraclavicular, inguinal or axillary adenopathy noted   Extremities: Normal, atraumatic; no clubbing, cyanosis, or edema    Skin: No rashes, ulcers, or suspicious lesions noted     No visits with results within 2 Week(s) from this visit.   Latest known visit with results is:   Lab on 07/08/2019   Component Date Value Ref Range Status   • Color, UA 07/08/2019 Orange* Yellow, Straw Final   • Appearance, UA 07/08/2019 Cloudy* Clear Final   • pH, UA 07/08/2019 7.5  5.0 - 8.0 Final   • Specific Gravity, UA 07/08/2019 1.018  1.001 - 1.030 Final   • Glucose, UA 07/08/2019 Negative  Negative Final   • Ketones, UA 07/08/2019 Trace* Negative Final   • Bilirubin, UA 07/08/2019 Negative  Negative Final   • Blood, UA 07/08/2019 Negative  Negative Final   • Protein, UA 07/08/2019 Negative  Negative Final   • Leuk Esterase, UA 07/08/2019 Trace* Negative Final   • Nitrite, UA 07/08/2019 Negative  Negative Final   • Urobilinogen, UA 07/08/2019 1.0 E.U./dL  0.2 - 1.0 E.U./dL Final   • RBC, UA 07/08/2019 3-6* None Seen, 0-2 /HPF Final   • WBC, UA 07/08/2019 3-5* None Seen, 0-2 /HPF Final   • Bacteria, UA 07/08/2019 2+* None Seen, Trace /HPF Final   • Squamous Epithelial Cells, UA 07/08/2019 0-2  None Seen, 0-2 /HPF Final   • Hyaline Casts, UA 07/08/2019 7-12  0 - 6 /LPF Final   • Methodology 07/08/2019 Automated Microscopy   Final   • WBC 07/08/2019  0.26* 3.40 - 10.80 10*3/mm3 Final   • RBC 07/08/2019 2.54* 3.77 - 5.28 10*6/mm3 Final   • Hemoglobin 07/08/2019 8.4* 12.0 - 15.9 g/dL Final   • Hematocrit 07/08/2019 25.0* 34.0 - 46.6 % Final   • MCV 07/08/2019 98.4* 79.0 - 97.0 fL Final   • MCH 07/08/2019 33.1* 26.6 - 33.0 pg Final   • MCHC 07/08/2019 33.6  31.5 - 35.7 g/dL Final   • RDW 07/08/2019 13.8  12.3 - 15.4 % Final   • RDW-SD 07/08/2019 50.3  37.0 - 54.0 fl Final   • MPV 07/08/2019 10.6  6.0 - 12.0 fL Final   • Platelets 07/08/2019 81* 140 - 450 10*3/mm3 Final   • Neutrophil % 07/08/2019 7.8* 42.7 - 76.0 % Final   • Lymphocyte % 07/08/2019 57.7* 19.6 - 45.3 % Final   • Monocyte % 07/08/2019 15.4* 5.0 - 12.0 % Final   • Eosinophil % 07/08/2019 11.5* 0.3 - 6.2 % Final   • Basophil % 07/08/2019 3.8* 0.0 - 1.5 % Final   • Immature Grans % 07/08/2019 3.8* 0.0 - 0.5 % Final   • Neutrophils, Absolute 07/08/2019 0.02* 1.70 - 7.00 10*3/mm3 Final   • Lymphocytes, Absolute 07/08/2019 0.15* 0.70 - 3.10 10*3/mm3 Final   • Monocytes, Absolute 07/08/2019 0.04* 0.10 - 0.90 10*3/mm3 Final   • Eosinophils, Absolute 07/08/2019 0.03  0.00 - 0.40 10*3/mm3 Final   • Basophils, Absolute 07/08/2019 0.01  0.00 - 0.20 10*3/mm3 Final   • Immature Grans, Absolute 07/08/2019 0.01  0.00 - 0.05 10*3/mm3 Final   • nRBC 07/08/2019 0.0  0.0 - 0.2 /100 WBC Final   • RBC Morphology 07/08/2019 Normal  Normal Final   • WBC Morphology 07/08/2019 Normal  Normal Final   • Platelet Morphology 07/08/2019 Normal  Normal Final   • Urine Culture 07/08/2019 >100,000 CFU/mL Enterococcus faecalis*  Final        No results found.    ASSESSMENT: The patient is a very pleasant 69 y.o. female  with mandible diffuse large B-cell lymphoma.    PROBLEM LIST:  1. Mandible diffuse large B-cell lymphoma, stage IIE:  A.  Presented with gum pain and swelling  B.  CT facial bones done on February 26, 2019 revealed 2.3 cm lucency in the anterior mandible  C.  Status post biopsy done on March 7, 2019 consistent with  diffuse large B-cell lymphoma  D.  Bone marrow biopsy done on March 25, 2019 no evidence of lymphoma involvement.  E.  Whole body PET scan done on March 28, 2019 revealed hypermetabolic activity in the right curtis-mandible as well as jugular cervical chains hypermetabolic lymphadenopathy level 2 and level 3.  F. Started chemotegrapy with R-CHOP April 2019,status post 5 cycles.  2.  Osteoarthritis  3.  Chemotherapy-induced nausea  4.  Deep venous catheter  5.  Chemotherapy-induced constipation  6.  Thrombocytosis, reactive  7. Urinary frequency    PLAN:  1.  I will proceed with chemotherapy as scheduled today R CHOP cycle #6.  2.  The patient will follow up with us in 4 weeks to evaluate for treatment tolerance with repeat labs including CBC and CMP.   3.  I will monitor the patient blood work including blood counts, kidney function, liver function, and electrolytes.   4. We will repeat the patient's CAT scans after cycle #6. These will be ordered for prior to return.   5.  We reviewed again the potential risks and side effects of R CHOP chemotherapy including neutropenia, alopecia, nausea and vomiting, fatigue, neuropathy, cardiomyopathy, constipation, infusion reaction, and a small risk of myelodysplasia.  6.  I will continue the patient on Zofran as needed for chemotherapy-induced nausea.  7.  We will continue routine port care with use of EMLA cream prior to access.   8.  We will continue Mobic as needed for osteoarthritis.  We will continue to monitor her platelets as she may be at a higher risk for bleeding while on treatment.   9. We will continue bowel regimen for constipation.  10. She will continue oxybutynin 10 mg for over active bladder.   11. The patient will continue Neulasta on-body injector due to history of chemotherapy induced neutropenia. I advised she monitor for any signs or symptoms of infections.     Migdalia Blanca, APRN  7/22/2019

## 2019-07-23 ENCOUNTER — TELEPHONE (OUTPATIENT)
Dept: ONCOLOGY | Facility: CLINIC | Age: 69
End: 2019-07-23

## 2019-07-23 RX ORDER — FUROSEMIDE 20 MG/1
20 TABLET ORAL AS NEEDED
Qty: 10 TABLET | Refills: 0 | Status: SHIPPED | OUTPATIENT
Start: 2019-07-23

## 2019-07-23 NOTE — TELEPHONE ENCOUNTER
----- Message from Niya Soto MA sent at 7/23/2019 11:01 AM EDT -----  Regarding: Migdalia Blanca  [facility] [phone] requesting a frequency for the RX lasix 20mg that was sent in.    Thank you,     ~Niya

## 2019-07-26 ENCOUNTER — TELEPHONE (OUTPATIENT)
Dept: ONCOLOGY | Facility: CLINIC | Age: 69
End: 2019-07-26

## 2019-07-26 NOTE — TELEPHONE ENCOUNTER
----- Message from Laura Lopez sent at 7/26/2019 12:19 PM EDT -----  Regarding: RITA - QUESTIONS ABOUT LABS   Contact: [phone]  PATIENT LEFT VOICEMAIL AND HAS QUESTIONS ABOUT LABS

## 2019-07-26 NOTE — TELEPHONE ENCOUNTER
Returned call to patient and she wanted to know if it was ok to have labs drawn with port flush after her follow up Dr. Zuluaga.  Dr. Zuluaga okay with having labs at that time.

## 2019-07-30 ENCOUNTER — TELEPHONE (OUTPATIENT)
Dept: ONCOLOGY | Facility: CLINIC | Age: 69
End: 2019-07-30

## 2019-07-30 ENCOUNTER — LAB (OUTPATIENT)
Dept: LAB | Facility: HOSPITAL | Age: 69
End: 2019-07-30

## 2019-07-30 DIAGNOSIS — R35.0 URINARY FREQUENCY: ICD-10-CM

## 2019-07-30 DIAGNOSIS — R35.0 URINARY FREQUENCY: Primary | ICD-10-CM

## 2019-07-30 LAB
BILIRUB UR QL STRIP: NEGATIVE
CLARITY UR: CLEAR
COLOR UR: YELLOW
GLUCOSE UR STRIP-MCNC: NEGATIVE MG/DL
HGB UR QL STRIP.AUTO: NEGATIVE
KETONES UR QL STRIP: NEGATIVE
LEUKOCYTE ESTERASE UR QL STRIP.AUTO: NEGATIVE
NITRITE UR QL STRIP: NEGATIVE
PH UR STRIP.AUTO: 8 [PH] (ref 5–8)
PROT UR QL STRIP: NEGATIVE
SP GR UR STRIP: 1.01 (ref 1–1.03)
UROBILINOGEN UR QL STRIP: NORMAL

## 2019-07-30 PROCEDURE — 81003 URINALYSIS AUTO W/O SCOPE: CPT

## 2019-07-30 NOTE — TELEPHONE ENCOUNTER
Patient called [facility] stating she felt as though she is having another UTI like she did after her fifth treatment. Stating that I recently had my 6 th cycle and I have had chills and urgency. Returned call to patient and asked if she is running a temp and was informed she was not.    Discussed with Martita MILLER patient's symptoms and after ANGELA went back to look in chart looked as though antibiotics had to changed at the first of month related to UTI and fever. ANGELA wanted orders in computer for UA with culture and patient to go to lab and give urine sample.     When nurse returned call to patient and informed her to have urine sample her at lab patient stated she didn't have ride stating I can't get there. Informed patient that since this had been her second UTI in month Martita MILLER wanted to have urine sample so that we could get culture to make sure we have patient on the correct antibiotic. Patient stated she would try to come into lab to have it drawn sometime today.

## 2019-07-30 NOTE — TELEPHONE ENCOUNTER
Patient UA came back clear reported by Martita MILLER. Called patient to let her know results. Asking patient per Martita MILLER request if patient is taking her Oxbutynin for increased urinary urgency along with if she is having to take Lasix. Patient stated she had not and was glad she doesn't have infection but can't figure out what's going on. Informed patient to monitor her temp since she reports having increased chills. Informing her that some patient will chill before having temp. Patient stated she will monitor temp and if she does have a temp she will call to notify.

## 2019-08-16 ENCOUNTER — TELEPHONE (OUTPATIENT)
Dept: ONCOLOGY | Facility: CLINIC | Age: 69
End: 2019-08-16

## 2019-08-16 NOTE — TELEPHONE ENCOUNTER
Patient called triage line to report urinary frequency. She states that she is urinating so often in the day that she loses count. She reports that urine is clear and odorless, denies fever, denies pain or burning on urination. Per ANGELA Jansen patient to call PCP as she feels this is not treatment related. Patient updated and is in agreement, verbalized understanding.

## 2019-08-17 ENCOUNTER — OFFICE VISIT (OUTPATIENT)
Dept: FAMILY MEDICINE CLINIC | Facility: CLINIC | Age: 69
End: 2019-08-17

## 2019-08-17 VITALS
TEMPERATURE: 99.6 F | HEART RATE: 102 BPM | SYSTOLIC BLOOD PRESSURE: 114 MMHG | RESPIRATION RATE: 24 BRPM | HEIGHT: 63 IN | WEIGHT: 144 LBS | OXYGEN SATURATION: 97 % | DIASTOLIC BLOOD PRESSURE: 62 MMHG | BODY MASS INDEX: 25.52 KG/M2

## 2019-08-17 DIAGNOSIS — R94.6 ABNORMAL THYROID SCAN: ICD-10-CM

## 2019-08-17 DIAGNOSIS — R35.0 URINARY FREQUENCY: Primary | ICD-10-CM

## 2019-08-17 DIAGNOSIS — N30.01 ACUTE CYSTITIS WITH HEMATURIA: ICD-10-CM

## 2019-08-17 LAB
BILIRUB BLD-MCNC: NEGATIVE MG/DL
CLARITY, POC: ABNORMAL
COLOR UR: YELLOW
GLUCOSE UR STRIP-MCNC: NEGATIVE MG/DL
KETONES UR QL: NEGATIVE
LEUKOCYTE EST, POC: ABNORMAL
NITRITE UR-MCNC: NEGATIVE MG/ML
PH UR: 6 [PH] (ref 5–8)
PROT UR STRIP-MCNC: ABNORMAL MG/DL
RBC # UR STRIP: ABNORMAL /UL
SP GR UR: 1.01 (ref 1–1.03)
UROBILINOGEN UR QL: NORMAL

## 2019-08-17 PROCEDURE — 81003 URINALYSIS AUTO W/O SCOPE: CPT | Performed by: FAMILY MEDICINE

## 2019-08-17 PROCEDURE — 87186 SC STD MICRODIL/AGAR DIL: CPT | Performed by: FAMILY MEDICINE

## 2019-08-17 PROCEDURE — 87086 URINE CULTURE/COLONY COUNT: CPT | Performed by: FAMILY MEDICINE

## 2019-08-17 PROCEDURE — 87077 CULTURE AEROBIC IDENTIFY: CPT | Performed by: FAMILY MEDICINE

## 2019-08-17 PROCEDURE — 99214 OFFICE O/P EST MOD 30 MIN: CPT | Performed by: FAMILY MEDICINE

## 2019-08-17 RX ORDER — OXYBUTYNIN CHLORIDE 15 MG/1
15 TABLET, EXTENDED RELEASE ORAL DAILY
Qty: 30 TABLET | Refills: 2 | Status: SHIPPED | OUTPATIENT
Start: 2019-08-17 | End: 2019-11-08 | Stop reason: SDUPTHER

## 2019-08-17 RX ORDER — NITROFURANTOIN 25; 75 MG/1; MG/1
100 CAPSULE ORAL EVERY 12 HOURS SCHEDULED
Qty: 10 CAPSULE | Refills: 0 | Status: SHIPPED | OUTPATIENT
Start: 2019-08-17 | End: 2019-08-23

## 2019-08-17 NOTE — PROGRESS NOTES
Lanette Simpson presents today to establish care.    Chief Complaint   Patient presents with   • Urinary Incontinence      Transferring care from another physician.     Urinary Frequency    This is a chronic problem. The problem occurs every urination. The problem has been unchanged. Associated symptoms include frequency. Pertinent negatives include no hematuria.        She urinates all the time and can't hold. Nocturia every 2 hours. Worse in the morning, every hour. With last antibiotic had a bad urine odor. Little bit of odor now. Color corn silk to clear. At times has painful urination, stinging when she feels like she still has to go. Lots of dry mouth with oxybutynin.      Abnormal scans:  Additionally she had an outside CT scan done following up on her cancer and she was concerned because it mentioned bladder wall thickening. Her  had bladder cancer.  It also had mentioned heterogeneous thyroid. She has no known thyroid problems.           Review of Systems   HENT:        Dry mouth   Gastrointestinal: Negative for constipation.   Genitourinary: Positive for frequency and urinary incontinence. Negative for hematuria.   Psychiatric/Behavioral: Negative for depressed mood.      PHQ-2/PHQ-9 Depression Screening 8/17/2019   Little interest or pleasure in doing things 0   Feeling down, depressed, or hopeless 0   Total Score 0     STEADI Fall Risk Clinician Key Questions   Have you fallen in the past year?: No  Do you feel unsteady with walking?: Yes  Are you worried about falling?: No    Stay Idependant Patient Questions   Patient Fall Risk Assessment Score : 0        Past Medical History:   Diagnosis Date   • Arthritis    • Colon, diverticulosis    • Diffuse large B cell lymphoma (CMS/HCC)    • Nonalcoholic hepatosteatosis    • OAB (overactive bladder)    • Wears glasses         Past Surgical History:   Procedure Laterality Date   • BONE BIOPSY      jaw   • BONE MARROW BIOPSY     • COLONOSCOPY  2013    • TOTAL ABDOMINAL HYSTERECTOMY      bladder lift   • VENOUS ACCESS DEVICE (PORT) INSERTION N/A 4/4/2019    Procedure: PORT PLACEMENT;  Surgeon: Alan Ling MD;  Location: [facility];  Service: General        Family History   Problem Relation Age of Onset   • Pancreatic cancer Mother    • Hypertension Mother    • Diabetes Father    • Prostate cancer Father    • Hypertension Sister    • Diabetes Brother    • Hypertension Brother    • Breast cancer Maternal Grandmother    • Stroke Maternal Grandfather    • Breast cancer Paternal Grandmother    • Glaucoma Paternal Grandfather    • Diabetes Brother    • Hypertension Brother         Social History     Socioeconomic History   • Marital status:      Spouse name: Jay   • Number of children: 3   • Years of education: Not on file   • Highest education level: Not on file   Occupational History   • 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    3 kids.    10 gk.    Working PT    Exercise: active, no formal    Dental: due, does not like the dentist    Eye: utd, glasses        Current Outpatient Medications on File Prior to Visit   Medication Sig Dispense Refill   • CALCIUM PO Take 600 mg by mouth Daily.     • lidocaine-prilocaine (EMLA) 2.5-2.5 % cream Apply  topically to the appropriate area as directed As Needed (45-60 minutes prior to port access.  Cover with saran/plastic wrap.). (Patient taking differently: Apply 1 application topically to the appropriate area as directed As Needed (45-60 minutes prior to port access.  Cover with saran/plastic wrap.).) 30 g 3   • meloxicam (MOBIC) 7.5 MG tablet Take 1 tablet by mouth Daily. 30 tablet 0   • Multiple Vitamins-Minerals (MULTIVITAMIN  "ADULT PO) Take 1 tablet by mouth Daily.     • omeprazole (priLOSEC) 20 MG capsule Take 1 capsule by mouth Daily. At bedtime 90 capsule 3   • ondansetron (ZOFRAN) 8 MG tablet Take 1 tablet by mouth 3 (Three) Times a Day As Needed for Nausea or Vomiting. 30 tablet 5   • [DISCONTINUED] oxybutynin XL (DITROPAN-XL) 10 MG 24 hr tablet TAKE 1 TABLET EVERY DAY 90 tablet 1   • furosemide (LASIX) 20 MG tablet Take 1 tablet by mouth As Needed (fluid retention). For fluid retention 10 tablet 0   • [DISCONTINUED] azithromycin (ZITHROMAX) 250 MG tablet Take 2 tablets the first day, then 1 tablet daily for 4 days. 6 tablet 0   • [DISCONTINUED] cefuroxime (CEFTIN) 250 MG tablet Take 1 tablet by mouth 2 (Two) Times a Day. 14 tablet 0   • [DISCONTINUED] levoFLOXacin (LEVAQUIN) 750 MG tablet Take 1 tablet by mouth Daily. 7 tablet 0   • [DISCONTINUED] predniSONE (DELTASONE) 50 MG tablet Take 50 mg by mouth Daily. As directed.       No current facility-administered medications on file prior to visit.        No Known Allergies     Visit Vitals  /62   Pulse 102   Temp 99.6 °F (37.6 °C) (Temporal)   Resp 24   Ht 160 cm (63\")   Wt 65.3 kg (144 lb)   SpO2 97%   BMI 25.51 kg/m²        Physical Exam   Constitutional: No distress.   Chronically ill-appearing   Cardiovascular: Normal rate and regular rhythm.   No murmur heard.  Pulmonary/Chest: Effort normal and breath sounds normal.   Abdominal: Soft. There is no tenderness.   Genitourinary:   Genitourinary Comments: No suprapubic tenderness   Psychiatric: She has a normal mood and affect.   Vitals reviewed.       Results for orders placed or performed in visit on 08/17/19   POCT urinalysis dipstick, automated   Result Value Ref Range    Color Yellow Yellow, Straw, Dark Yellow, Kelly    Clarity, UA Cloudy (A) Clear    Specific Gravity  1.015 1.005 - 1.030    pH, Urine 6.0 5.0 - 8.0    Leukocytes 500 Saul/ul (A) Negative    Nitrite, UA Negative Negative    Protein, POC 1+ (A) Negative mg/dL "    Glucose, UA Negative Negative, 1000 mg/dL (3+) mg/dL    Ketones, UA Negative Negative    Urobilinogen, UA Normal Normal    Bilirubin Negative Negative    Blood, UA Trace (A) Negative        Lanette was seen today for urinary incontinence.    Diagnoses and all orders for this visit:    Urinary frequency  -     POCT urinalysis dipstick, automated  -     oxybutynin XL (DITROPAN XL) 15 MG 24 hr tablet; Take 1 tablet by mouth Daily.  -     Urine Culture - Urine, Urine, Clean Catch; Future  -     Urine Culture - Urine, Urine, Clean Catch  At this time increase oxybutynin to 15.  Discussed potential side effects to monitor for.  Additionally discussed Myrbetriq but plan to defer due to cost concerns.  In regards to her CT scan with bladder wall thickening recommend further evaluation with urology and possible cystoscopy but she prefers to wait at this time.  Acute cystitis with hematuria  -     Urine Culture - Urine, Urine, Clean Catch; Future  -     nitrofurantoin, macrocrystal-monohydrate, (MACROBID) 100 MG capsule; Take 1 capsule by mouth Every 12 (Twelve) Hours for 5 days.  -     Urine Culture - Urine, Urine, Clean Catch  New.  Reviewed the records show that her last UTI had enterococcus and was susceptible to multiple antibiotics.  She has been on Levaquin, azithromycin, Ceftin recently.  At this time start Macrobid.  Check culture sensitivities.   Abnormal thyroid scan  -     TSH Rfx On Abnormal To Free T4; Future  Recommend further evaluation with thyroid ultrasound.  Patient plans to discuss with her oncologist first before ordering additional studies.  Recommend checking thyroid function with her next lab draw next week.      Return if symptoms worsen or fail to improve.

## 2019-08-19 ENCOUNTER — TELEPHONE (OUTPATIENT)
Dept: FAMILY MEDICINE CLINIC | Facility: CLINIC | Age: 69
End: 2019-08-19

## 2019-08-19 NOTE — TELEPHONE ENCOUNTER
Contacted patient and she stated that the Macrobid has been improving her sx, I alerted her that we will alert her when her culture returns

## 2019-08-19 NOTE — TELEPHONE ENCOUNTER
Result Notes for Urine Culture - Urine, Urine, Clean Catch     Notes recorded by Maria L Davalos MA on 8/19/2019 at 10:58 AM EDT  Called patient, no answer. No option to lvm  ------    Notes recorded by Janette Rodriguez MD on 8/19/2019 at 10:38 AM EDT  Urine culture shows enterococcus.  Will await sensitivity report.

## 2019-08-20 LAB — BACTERIA SPEC AEROBE CULT: ABNORMAL

## 2019-08-23 ENCOUNTER — HOSPITAL ENCOUNTER (OUTPATIENT)
Dept: ONCOLOGY | Facility: HOSPITAL | Age: 69
Setting detail: INFUSION SERIES
Discharge: HOME | End: 2019-08-23

## 2019-08-23 ENCOUNTER — LAB (OUTPATIENT)
Dept: LAB | Facility: HOSPITAL | Age: 69
End: 2019-08-23

## 2019-08-23 ENCOUNTER — OFFICE VISIT (OUTPATIENT)
Dept: ONCOLOGY | Facility: CLINIC | Age: 69
End: 2019-08-23

## 2019-08-23 VITALS
WEIGHT: 145 LBS | RESPIRATION RATE: 12 BRPM | HEART RATE: 87 BPM | DIASTOLIC BLOOD PRESSURE: 64 MMHG | OXYGEN SATURATION: 100 % | HEIGHT: 63 IN | TEMPERATURE: 97.8 F | BODY MASS INDEX: 25.69 KG/M2 | SYSTOLIC BLOOD PRESSURE: 119 MMHG

## 2019-08-23 DIAGNOSIS — R94.6 ABNORMAL THYROID SCAN: ICD-10-CM

## 2019-08-23 DIAGNOSIS — C83.31 DIFFUSE LARGE B-CELL LYMPHOMA OF LYMPH NODES OF HEAD (HCC): Primary | ICD-10-CM

## 2019-08-23 DIAGNOSIS — C83.31 DIFFUSE LARGE B-CELL LYMPHOMA OF LYMPH NODES OF HEAD (HCC): ICD-10-CM

## 2019-08-23 LAB
ALBUMIN SERPL-MCNC: 4.3 G/DL (ref 3.5–5.2)
ALBUMIN/GLOB SERPL: 1.7 G/DL
ALP SERPL-CCNC: 55 U/L (ref 39–117)
ALT SERPL W P-5'-P-CCNC: 21 U/L (ref 1–33)
ANION GAP SERPL CALCULATED.3IONS-SCNC: 12 MMOL/L (ref 5–15)
AST SERPL-CCNC: 32 U/L (ref 1–32)
BILIRUB SERPL-MCNC: 0.5 MG/DL (ref 0.2–1.2)
BUN BLD-MCNC: 13 MG/DL (ref 8–23)
BUN/CREAT SERPL: 21 (ref 7–25)
CALCIUM SPEC-SCNC: 9.3 MG/DL (ref 8.6–10.5)
CHLORIDE SERPL-SCNC: 105 MMOL/L (ref 98–107)
CO2 SERPL-SCNC: 24 MMOL/L (ref 22–29)
CREAT BLD-MCNC: 0.62 MG/DL (ref 0.57–1)
ERYTHROCYTE [DISTWIDTH] IN BLOOD BY AUTOMATED COUNT: 14.6 % (ref 12.3–15.4)
GFR SERPL CREATININE-BSD FRML MDRD: 95 ML/MIN/1.73
GLOBULIN UR ELPH-MCNC: 2.5 GM/DL
GLUCOSE BLD-MCNC: 86 MG/DL (ref 65–99)
HCT VFR BLD AUTO: 30.1 % (ref 34–46.6)
HGB BLD-MCNC: 9.9 G/DL (ref 12–15.9)
LYMPHOCYTES # BLD AUTO: 1.1 10*3/MM3 (ref 0.7–3.1)
LYMPHOCYTES NFR BLD AUTO: 38.2 % (ref 19.6–45.3)
MCH RBC QN AUTO: 32.7 PG (ref 26.6–33)
MCHC RBC AUTO-ENTMCNC: 32.9 G/DL (ref 31.5–35.7)
MCV RBC AUTO: 99.4 FL (ref 79–97)
MONOCYTES # BLD AUTO: 0.4 10*3/MM3 (ref 0.1–0.9)
MONOCYTES NFR BLD AUTO: 14.4 % (ref 5–12)
NEUTROPHILS # BLD AUTO: 1.4 10*3/MM3 (ref 1.7–7)
NEUTROPHILS NFR BLD AUTO: 47.4 % (ref 42.7–76)
PLATELET # BLD AUTO: 233 10*3/MM3 (ref 140–450)
PMV BLD AUTO: 6.2 FL (ref 6–12)
POTASSIUM BLD-SCNC: 4.3 MMOL/L (ref 3.5–5.2)
PROT SERPL-MCNC: 6.8 G/DL (ref 6–8.5)
RBC # BLD AUTO: 3.03 10*6/MM3 (ref 3.77–5.28)
SODIUM BLD-SCNC: 141 MMOL/L (ref 136–145)
TSH SERPL DL<=0.05 MIU/L-ACNC: 1.52 MIU/ML (ref 0.27–4.2)
WBC NRBC COR # BLD: 2.9 10*3/MM3 (ref 3.4–10.8)

## 2019-08-23 PROCEDURE — 36415 COLL VENOUS BLD VENIPUNCTURE: CPT

## 2019-08-23 PROCEDURE — 84443 ASSAY THYROID STIM HORMONE: CPT

## 2019-08-23 PROCEDURE — 80053 COMPREHEN METABOLIC PANEL: CPT

## 2019-08-23 PROCEDURE — 85025 COMPLETE CBC W/AUTO DIFF WBC: CPT

## 2019-08-23 PROCEDURE — 96523 IRRIG DRUG DELIVERY DEVICE: CPT

## 2019-08-23 PROCEDURE — 99214 OFFICE O/P EST MOD 30 MIN: CPT | Performed by: INTERNAL MEDICINE

## 2019-08-23 RX ORDER — SODIUM CHLORIDE 0.9 % (FLUSH) 0.9 %
10 SYRINGE (ML) INJECTION AS NEEDED
Status: CANCELLED | OUTPATIENT
Start: 2019-08-23

## 2019-08-23 RX ADMIN — HEPARIN 500 UNITS: 100 SYRINGE at 12:20

## 2019-08-23 NOTE — PROGRESS NOTES
DATE OF VISIT: 8/23/2019    REASON FOR VISIT: Followup for mandible diffuse large B-cell lymphoma     HISTORY OF PRESENT ILLNESS: The patient is a very pleasant 69 y.o. 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  Staging workup revealed stage IIE disease.  Patient was started on definitive chemotherapy with R CHOP April 8, 2019.  The patient completed cycle #6 June 22, 2019.  The patient is here today for scheduled follow-up visit.    SUBJECTIVE: The patient is here today with her .  She is doing fairly well.  She still complains of fatigue her energy is coming back.  Denies any mouth sores.  No fever or chills.    PAST MEDICAL HISTORY/SOCIAL HISTORY/FAMILY HISTORY: Reviewed by me and unchanged from my documentation done on 08/23/19.    Review of Systems   Constitutional: Positive for fatigue. Negative for activity change, appetite change, chills, fever and unexpected weight change.   HENT: Negative for hearing loss, mouth sores, nosebleeds, sore throat and trouble swallowing.    Eyes: Negative for visual disturbance.   Respiratory: Negative for cough, chest tightness, shortness of breath and wheezing.    Cardiovascular: Negative for chest pain, palpitations and leg swelling.   Gastrointestinal: Negative for abdominal distention, abdominal pain, blood in stool, constipation, diarrhea, nausea, rectal pain and vomiting.   Endocrine: Negative for cold intolerance and heat intolerance.   Genitourinary: Positive for frequency. Negative for difficulty urinating, dysuria and urgency.   Musculoskeletal: Negative for arthralgias, back pain, gait problem, joint swelling and myalgias.   Skin: Negative for rash.   Neurological: Negative for dizziness, tremors, syncope, weakness, light-headedness, numbness and headaches.   Hematological: Negative for adenopathy. Does not bruise/bleed easily.   Psychiatric/Behavioral: Negative for confusion, sleep  "disturbance and suicidal ideas. The patient is not nervous/anxious.          Current Outpatient Medications:   •  CALCIUM PO, Take 600 mg by mouth Daily., Disp: , Rfl:   •  furosemide (LASIX) 20 MG tablet, Take 1 tablet by mouth As Needed (fluid retention). For fluid retention, Disp: 10 tablet, Rfl: 0  •  lidocaine-prilocaine (EMLA) 2.5-2.5 % cream, Apply  topically to the appropriate area as directed As Needed (45-60 minutes prior to port access.  Cover with saran/plastic wrap.). (Patient taking differently: Apply 1 application topically to the appropriate area as directed As Needed (45-60 minutes prior to port access.  Cover with saran/plastic wrap.).), Disp: 30 g, Rfl: 3  •  meloxicam (MOBIC) 7.5 MG tablet, Take 1 tablet by mouth Daily., Disp: 30 tablet, Rfl: 0  •  Multiple Vitamins-Minerals (MULTIVITAMIN ADULT PO), Take 1 tablet by mouth Daily., Disp: , Rfl:   •  omeprazole (priLOSEC) 20 MG capsule, Take 1 capsule by mouth Daily. At bedtime, Disp: 90 capsule, Rfl: 3  •  ondansetron (ZOFRAN) 8 MG tablet, Take 1 tablet by mouth 3 (Three) Times a Day As Needed for Nausea or Vomiting., Disp: 30 tablet, Rfl: 5  •  oxybutynin XL (DITROPAN XL) 15 MG 24 hr tablet, Take 1 tablet by mouth Daily., Disp: 30 tablet, Rfl: 2    PHYSICAL EXAMINATION:   /64   Pulse 87   Temp 97.8 °F (36.6 °C) (Temporal)   Resp 12   Ht 160 cm (63\")   Wt 65.8 kg (145 lb)   SpO2 100%   BMI 25.69 kg/m²     ECOG Performance Status: 1 - Symptomatic but completely ambulatory  General Appearance:  alert, cooperative, no apparent distress and appears stated age   Neurologic/Psychiatric: A&O x 3, gait steady, appropriate affect, strength 5/5 in all muscle groups   HEENT:  Normocephalic, without obvious abnormality, mucous membranes moist   Neck: Supple, symmetrical, trachea midline, no adenopathy;  No thyromegaly, masses, or tenderness   Lungs:   Clear to auscultation bilaterally; respirations regular, even, and unlabored bilaterally   Heart: "  Regular rate and rhythm, no murmurs appreciated   Abdomen:   Soft, non-tender, non-distended and no organomegaly   Lymph nodes: No cervical, supraclavicular, inguinal or axillary adenopathy noted   Extremities: Normal, atraumatic; no clubbing, cyanosis, or edema    Skin: No rashes, ulcers, or suspicious lesions noted     Lab on 08/23/2019   Component Date Value Ref Range Status   • Glucose 08/23/2019 86  65 - 99 mg/dL Final   • BUN 08/23/2019 13  8 - 23 mg/dL Final   • Creatinine 08/23/2019 0.62  0.57 - 1.00 mg/dL Final   • Sodium 08/23/2019 141  136 - 145 mmol/L Final   • Potassium 08/23/2019 4.3  3.5 - 5.2 mmol/L Final   • Chloride 08/23/2019 105  98 - 107 mmol/L Final   • CO2 08/23/2019 24.0  22.0 - 29.0 mmol/L Final   • Calcium 08/23/2019 9.3  8.6 - 10.5 mg/dL Final   • Total Protein 08/23/2019 6.8  6.0 - 8.5 g/dL Final   • Albumin 08/23/2019 4.30  3.50 - 5.20 g/dL Final   • ALT (SGPT) 08/23/2019 21  1 - 33 U/L Final   • AST (SGOT) 08/23/2019 32  1 - 32 U/L Final   • Alkaline Phosphatase 08/23/2019 55  39 - 117 U/L Final   • Total Bilirubin 08/23/2019 0.5  0.2 - 1.2 mg/dL Final   • eGFR Non African Amer 08/23/2019 95  >60 mL/min/1.73 Final   • Globulin 08/23/2019 2.5  gm/dL Final   • A/G Ratio 08/23/2019 1.7  g/dL Final   • BUN/Creatinine Ratio 08/23/2019 21.0  7.0 - 25.0 Final   • Anion Gap 08/23/2019 12.0  5.0 - 15.0 mmol/L Final   • WBC 08/23/2019 2.90* 3.40 - 10.80 10*3/mm3 Final   • RBC 08/23/2019 3.03* 3.77 - 5.28 10*6/mm3 Final   • Hemoglobin 08/23/2019 9.9* 12.0 - 15.9 g/dL Final   • Hematocrit 08/23/2019 30.1* 34.0 - 46.6 % Final   • RDW 08/23/2019 14.6  12.3 - 15.4 % Final   • MCV 08/23/2019 99.4* 79.0 - 97.0 fL Final   • MCH 08/23/2019 32.7  26.6 - 33.0 pg Final   • MCHC 08/23/2019 32.9  31.5 - 35.7 g/dL Final   • MPV 08/23/2019 6.2  6.0 - 12.0 fL Final   • Platelets 08/23/2019 233  140 - 450 10*3/mm3 Final   • Neutrophil % 08/23/2019 47.4  42.7 - 76.0 % Final   • Lymphocyte % 08/23/2019 38.2  19.6 -  45.3 % Final   • Monocyte % 08/23/2019 14.4* 5.0 - 12.0 % Final   • Neutrophils, Absolute 08/23/2019 1.40* 1.70 - 7.00 10*3/mm3 Final   • Lymphocytes, Absolute 08/23/2019 1.10  0.70 - 3.10 10*3/mm3 Final   • Monocytes, Absolute 08/23/2019 0.40  0.10 - 0.90 10*3/mm3 Final   Office Visit on 08/17/2019   Component Date Value Ref Range Status   • Color 08/17/2019 Yellow  Yellow, Straw, Dark Yellow, Kelly Final   • Clarity, UA 08/17/2019 Cloudy* Clear Final   • Specific Gravity  08/17/2019 1.015  1.005 - 1.030 Final   • pH, Urine 08/17/2019 6.0  5.0 - 8.0 Final   • Leukocytes 08/17/2019 500 Saul/ul* Negative Final   • Nitrite, UA 08/17/2019 Negative  Negative Final   • Protein, POC 08/17/2019 1+* Negative mg/dL Final   • Glucose, UA 08/17/2019 Negative  Negative, 1000 mg/dL (3+) mg/dL Final   • Ketones, UA 08/17/2019 Negative  Negative Final   • Urobilinogen, UA 08/17/2019 Normal  Normal Final   • Bilirubin 08/17/2019 Negative  Negative Final   • Blood, UA 08/17/2019 Trace* Negative Final   • Urine Culture 08/17/2019 50,000 CFU/mL Enterococcus faecalis*  Final        No results found.    ASSESSMENT: The patient is a very pleasant 69 y.o. female  with mandible diffuse large B-cell lymphoma.    PROBLEM LIST:  1. Mandible diffuse large B-cell lymphoma, stage IIE:  A.  Presented with gum pain and swelling  B.  CT facial bones done on February 26, 2019 revealed 2.3 cm lucency in the anterior mandible  C.  Status post biopsy done on March 7, 2019 consistent with diffuse large B-cell lymphoma  D.  Bone marrow biopsy done on March 25, 2019 no evidence of lymphoma involvement.  E.  Whole body PET scan done on March 28, 2019 revealed hypermetabolic activity in the right curtis-mandible as well as jugular cervical chains hypermetabolic lymphadenopathy level 2 and level 3.  F. Started chemotegrapy with R-CHOP April 2019,status post 6 cycles, completed July 22, 2019.  2.  Osteoarthritis  3.  Chemotherapy-induced nausea  4.  Deep venous  catheter  5.  Chemotherapy-induced constipation  6.  Thrombocytosis, reactive  7. Urinary frequency    PLAN:  1.  I did go over the scan results with the patient and her  reassured and there is no evidence of recurrent lymphoma.  2.  The patient follow-up with us in 3 months per  3.  Repeat scans on return.  4.  The patient would like to maintain the port for now we will do flushes every 6 weeks.    5.  The patient was advised to follow-up with her oral surgeon at  for gum examination that needs to be done periodically every 4 to 6 months.  6.  I will continue the patient on Zofran as needed for chemotherapy-induced nausea.  7.  We will continue routine port care with use of EMLA cream prior to access.   8.  We will continue Mobic as needed for osteoarthritis.    9. We will continue bowel regimen for constipation.  10. She will continue oxybutynin 10 mg for over active bladder.   Joan Zuluaga MD  8/23/2019

## 2019-09-05 ENCOUNTER — TELEPHONE (OUTPATIENT)
Dept: ONCOLOGY | Facility: CLINIC | Age: 69
End: 2019-09-05

## 2019-09-05 DIAGNOSIS — C83.31 DIFFUSE LARGE B-CELL LYMPHOMA OF LYMPH NODES OF HEAD (HCC): Primary | ICD-10-CM

## 2019-09-05 NOTE — TELEPHONE ENCOUNTER
Returned call to patient and left message that a referral for port removal with [facility].  Sara OCHOA Referral coordinator to schedule and call patient with date and time.

## 2019-09-05 NOTE — TELEPHONE ENCOUNTER
----- Message from Dimple Broderick sent at 9/5/2019 10:17 AM EDT -----  Regarding: [facility]-PORT REMOVAL  Contact: [phone]  Patient called and Dr. Zuluaga told her she could get her port removed and she is ready to do so wants to know when this can be setup? Please call.

## 2019-09-12 ENCOUNTER — TELEPHONE (OUTPATIENT)
Dept: ONCOLOGY | Facility: CLINIC | Age: 69
End: 2019-09-12

## 2019-09-12 NOTE — TELEPHONE ENCOUNTER
----- Message from Laura Lopez sent at 9/12/2019  8:50 AM EDT -----  Regarding: [facility] - PORT FLUSH/REMOVAL   Contact: [phone]  PATIENT IS SCHEDULED FOR PORT FLUSH ON 10/07/2019 BUT ALSO IS SCHEDULED FOR PORT REMOVAL ON 10/23/2019. SHE IS WANTING TO KNOW IS IT NECESSARY TO STILL GET IT FLUSHED SINCE SHE IS GETTING IT REMOVED.

## 2019-09-25 ENCOUNTER — OFFICE VISIT (OUTPATIENT)
Dept: FAMILY MEDICINE CLINIC | Facility: CLINIC | Age: 69
End: 2019-09-25

## 2019-09-25 VITALS
OXYGEN SATURATION: 99 % | HEIGHT: 63 IN | WEIGHT: 142.2 LBS | HEART RATE: 82 BPM | SYSTOLIC BLOOD PRESSURE: 124 MMHG | DIASTOLIC BLOOD PRESSURE: 74 MMHG | BODY MASS INDEX: 25.2 KG/M2

## 2019-09-25 DIAGNOSIS — B35.3 TINEA PEDIS OF BOTH FEET: Primary | ICD-10-CM

## 2019-09-25 DIAGNOSIS — R20.0 NUMBNESS: ICD-10-CM

## 2019-09-25 PROCEDURE — 99214 OFFICE O/P EST MOD 30 MIN: CPT | Performed by: FAMILY MEDICINE

## 2019-09-25 RX ORDER — CLOTRIMAZOLE 1 %
CREAM (GRAM) TOPICAL 2 TIMES DAILY
Qty: 60 G | Refills: 1 | Status: SHIPPED | OUTPATIENT
Start: 2019-09-25 | End: 2019-10-09

## 2019-09-25 NOTE — PROGRESS NOTES
Chief Complaint   Patient presents with   • Itching     bilateral feet, has been going on for a couple of weeks. States that after chemo she has been having numbness, they have since broke out in a rash and itching        Itching   This is a new problem. The current episode started 1 to 4 weeks ago. The problem occurs constantly. The problem has been unchanged. Associated symptoms include numbness and a rash. Nothing aggravates the symptoms. Treatments tried: creams. The treatment provided no relief.      Bumpy rash on toes and around the heel and sides. Itching. Tried benadryl cream, hydrocortisone cream and Aveeno lotion. Worse itching at bedtime. She has been gardening recently wear boots.     Concerned about chemo side effect in her feet. She has numbness also in her finger tips and had difficulty with stringing a bead on necklace.       Review of Systems   Skin: Positive for itching and rash.        itching   Neurological: Positive for numbness.        Current Outpatient Medications on File Prior to Visit   Medication Sig Dispense Refill   • CALCIUM PO Take 600 mg by mouth Daily.     • furosemide (LASIX) 20 MG tablet Take 1 tablet by mouth As Needed (fluid retention). For fluid retention 10 tablet 0   • lidocaine-prilocaine (EMLA) 2.5-2.5 % cream Apply  topically to the appropriate area as directed As Needed (45-60 minutes prior to port access.  Cover with saran/plastic wrap.). (Patient taking differently: Apply 1 application topically to the appropriate area as directed As Needed (45-60 minutes prior to port access.  Cover with saran/plastic wrap.).) 30 g 3   • meloxicam (MOBIC) 7.5 MG tablet Take 1 tablet by mouth Daily. 30 tablet 0   • Multiple Vitamins-Minerals (MULTIVITAMIN ADULT PO) Take 1 tablet by mouth Daily.     • omeprazole (priLOSEC) 20 MG capsule Take 1 capsule by mouth Daily. At bedtime 90 capsule 3   • ondansetron (ZOFRAN) 8 MG tablet Take 1 tablet by mouth 3 (Three) Times a Day As Needed for  Nausea or Vomiting. 30 tablet 5   • oxybutynin XL (DITROPAN XL) 15 MG 24 hr tablet Take 1 tablet by mouth Daily. 30 tablet 2     No current facility-administered medications on file prior to visit.        No Known Allergies    Past Medical History:   Diagnosis Date   • Arthritis    • Colon, diverticulosis    • Diffuse large B cell lymphoma (CMS/HCC)    • Nonalcoholic hepatosteatosis    • OAB (overactive bladder)    • Wears glasses         Past Surgical History:   Procedure Laterality Date   • BONE BIOPSY      jaw   • BONE MARROW BIOPSY     • COLONOSCOPY  2013   • TOTAL ABDOMINAL HYSTERECTOMY      bladder lift   • VENOUS ACCESS DEVICE (PORT) INSERTION N/A 4/4/2019    Procedure: PORT PLACEMENT;  Surgeon: Alan Ling MD;  Location: [facility] OR;  Service: General        Family History   Problem Relation Age of Onset   • Pancreatic cancer Mother    • Hypertension Mother    • Diabetes Father    • Prostate cancer Father    • Hypertension Sister    • Diabetes Brother    • Hypertension Brother    • Breast cancer Maternal Grandmother    • Stroke Maternal Grandfather    • Breast cancer Paternal Grandmother    • Glaucoma Paternal Grandfather    • Diabetes Brother    • Hypertension Brother         Social History     Socioeconomic History   • Marital status:      Spouse name: Jay   • Number of children: 3   • Years of education: Not on file   • Highest education level: Not on file   Occupational History   • 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    3 kids.    10 gk.    Working PT    Exercise: active, no formal    Dental: due, does not like the dentist    Eye: utd, glasses        Visit Vitals  /74 (BP Location: Left arm, Patient  "Position: Sitting, Cuff Size: Adult)   Pulse 82   Ht 160 cm (63\")   Wt 64.5 kg (142 lb 3.2 oz)   SpO2 99%   BMI 25.19 kg/m²        Physical Exam   Constitutional: No distress.   Cardiovascular: Normal rate and regular rhythm.   No murmur heard.  Pulmonary/Chest: Effort normal and breath sounds normal.   Skin:   [place] distribution erythema and peeling bilateral feet and between toes.    Psychiatric: She has a normal mood and affect.   Vitals reviewed.           Lanette was seen today for itching.    Diagnoses and all orders for this visit:    Tinea pedis of both feet  -     clotrimazole (LOTRIMIN) 1 % cream; Apply  topically to the appropriate area as directed 2 (Two) Times a Day for 14 days.  New. Start antifungal cream for 2 weeks. Recommend blow dryer to feet after shower/bath to remove excess moisture. If she doesn't respond to topical treatment, call back to the office and I will send in a script for oral medication.   Numbness  Probably related to chemotherapy. She denies pain. Discussed medications for neuropathy don't reverse numbness but are mainly used for pain. Recommend daily foot exams.       Return for Medicare Wellness.    Dr. Janette Byrne  "

## 2019-09-26 ENCOUNTER — TELEPHONE (OUTPATIENT)
Dept: ONCOLOGY | Facility: CLINIC | Age: 69
End: 2019-09-26

## 2019-09-26 NOTE — TELEPHONE ENCOUNTER
----- Message from Dimple Broderick sent at 9/26/2019 11:23 AM EDT -----  Regarding: BADIN-FLU SHOT  Contact: [phone]  Patient called wants to know if she can get a flu shot?

## 2019-10-07 ENCOUNTER — APPOINTMENT (OUTPATIENT)
Dept: ONCOLOGY | Facility: HOSPITAL | Age: 69
End: 2019-10-07

## 2019-10-11 ENCOUNTER — HOSPITAL ENCOUNTER (OUTPATIENT)
Dept: ONCOLOGY | Facility: HOSPITAL | Age: 69
Setting detail: INFUSION SERIES
Discharge: HOME | End: 2019-10-11

## 2019-10-11 VITALS
WEIGHT: 140 LBS | TEMPERATURE: 97.5 F | HEART RATE: 101 BPM | SYSTOLIC BLOOD PRESSURE: 138 MMHG | DIASTOLIC BLOOD PRESSURE: 65 MMHG | BODY MASS INDEX: 24.8 KG/M2 | HEIGHT: 63 IN | RESPIRATION RATE: 16 BRPM

## 2019-10-11 DIAGNOSIS — C83.31 DIFFUSE LARGE B-CELL LYMPHOMA OF LYMPH NODES OF HEAD (HCC): Primary | ICD-10-CM

## 2019-10-11 PROCEDURE — 96523 IRRIG DRUG DELIVERY DEVICE: CPT

## 2019-10-11 RX ORDER — SODIUM CHLORIDE 0.9 % (FLUSH) 0.9 %
10 SYRINGE (ML) INJECTION AS NEEDED
OUTPATIENT
Start: 2019-10-11

## 2019-10-11 RX ORDER — HEPARIN SODIUM (PORCINE) LOCK FLUSH IV SOLN 100 UNIT/ML 100 UNIT/ML
500 SOLUTION INTRAVENOUS AS NEEDED
OUTPATIENT
Start: 2019-10-11

## 2019-10-11 RX ADMIN — HEPARIN 500 UNITS: 100 SYRINGE at 14:57

## 2019-11-08 ENCOUNTER — OFFICE VISIT (OUTPATIENT)
Dept: FAMILY MEDICINE CLINIC | Facility: CLINIC | Age: 69
End: 2019-11-08

## 2019-11-08 VITALS
WEIGHT: 141 LBS | BODY MASS INDEX: 24.98 KG/M2 | HEART RATE: 98 BPM | OXYGEN SATURATION: 98 % | DIASTOLIC BLOOD PRESSURE: 72 MMHG | SYSTOLIC BLOOD PRESSURE: 110 MMHG | HEIGHT: 63 IN

## 2019-11-08 DIAGNOSIS — K21.9 GASTROESOPHAGEAL REFLUX DISEASE, ESOPHAGITIS PRESENCE NOT SPECIFIED: ICD-10-CM

## 2019-11-08 DIAGNOSIS — Z00.00 WELL ADULT EXAM: Primary | ICD-10-CM

## 2019-11-08 DIAGNOSIS — Z12.31 VISIT FOR SCREENING MAMMOGRAM: ICD-10-CM

## 2019-11-08 DIAGNOSIS — Z23 NEED FOR VACCINATION: ICD-10-CM

## 2019-11-08 DIAGNOSIS — R35.0 URINARY FREQUENCY: ICD-10-CM

## 2019-11-08 PROCEDURE — 96160 PT-FOCUSED HLTH RISK ASSMT: CPT | Performed by: FAMILY MEDICINE

## 2019-11-08 PROCEDURE — 99397 PER PM REEVAL EST PAT 65+ YR: CPT | Performed by: FAMILY MEDICINE

## 2019-11-08 PROCEDURE — G0439 PPPS, SUBSEQ VISIT: HCPCS | Performed by: FAMILY MEDICINE

## 2019-11-08 RX ORDER — OXYBUTYNIN CHLORIDE 15 MG/1
15 TABLET, EXTENDED RELEASE ORAL DAILY
Qty: 30 TABLET | Refills: 11 | Status: SHIPPED | OUTPATIENT
Start: 2019-11-08 | End: 2020-05-28 | Stop reason: SDUPTHER

## 2019-11-08 NOTE — PATIENT INSTRUCTIONS
Medicare Wellness  Personal Prevention Plan of Service     Date of Office Visit:  2019  Encounter Provider:  Janette Byrne MD  Place of Service:  [facility]  Patient Name: Lanette Simpson  :  1950    As part of the Medicare Wellness portion of your visit today, we are providing you with this personalized preventive plan of services (PPPS). This plan is based upon recommendations of the [place] Preventive Services Task Force (USPSTF) and the Advisory Committee on Immunization Practices (ACIP).    This lists the preventive care services that should be considered, and provides dates of when you are due. Items listed as completed are up-to-date and do not require any further intervention.    Health Maintenance   Topic Date Due   • TDAP/TD VACCINES (1 - Tdap) 1968   • ZOSTER VACCINE (1 of 2) 1999   • PNEUMOCOCCAL VACCINE (65+ HIGH RISK) (1 of 2 - PCV13) 2014   • HEPATITIS C SCREENING  10/19/2017   • COLONOSCOPY  10/19/2017   • INFLUENZA VACCINE  2019   • MAMMOGRAM  2020   • MEDICARE ANNUAL WELLNESS  2020       No orders of the defined types were placed in this encounter.      Return in about 1 year (around 2020) for Medicare Wellness.

## 2019-11-08 NOTE — PROGRESS NOTES
The ABCs of the Annual Wellness Visit  Subsequent Medicare Wellness Visit    Chief Complaint   Patient presents with   • Medicare Wellness-subsequent       Subjective   History of Present Illness:  Lanette Simpson is a 69 y.o. female who presents for a Subsequent Medicare Wellness Visit.    She had mammogram this year. Denies breast symptoms.     Port was removed a couple weeks ago.     Oxybutynin helps bladder but side effects with dry mouth and nose.     Only takes meloxicam if knees bother her, worse in cold weather.     Calcium pill makes her stomach messed up.     Wakes up after an hour and can't go back to sleep.  She has taken melatonin without improvement.  She finds that Tylenol PM helps her sleep the best.      HEALTH RISK ASSESSMENT    Recent Hospitalizations:  No hospitalization(s) within the last year.    Current Medical Providers:  Patient Care Team:  Janette Rodriguez MD as PCP - General (Family Medicine)  Joan Zuluaga MD as Consulting Physician (Hematology and Oncology)    Smoking Status:  Social History     Tobacco Use   Smoking Status Never Smoker   Smokeless Tobacco Never Used       Alcohol Consumption:  Social History     Substance and Sexual Activity   Alcohol Use No       Depression Screen:   PHQ-2/PHQ-9 Depression Screening 11/8/2019   Little interest or pleasure in doing things 0   Feeling down, depressed, or hopeless 0   Total Score 0       Fall Risk Screen:  STEADI Fall Risk Assessment was completed, and patient is at LOW risk for falls.Assessment completed on:11/8/2019    Health Habits and Functional and Cognitive Screening:  Functional & Cognitive Status 11/8/2019   Do you have difficulty preparing food and eating? No   Do you have difficulty bathing yourself, getting dressed or grooming yourself? No   Do you have difficulty using the toilet? No   Do you have difficulty moving around from place to place? No   Do you have trouble with steps or getting out of a bed or a  chair? No   Current Diet Well Balanced Diet   Dental Exam Not up to date   Eye Exam Up to date   Exercise (times per week) 5 times per week   Current Exercise Activities Include Walking   Do you need help using the phone?  No   Are you deaf or do you have serious difficulty hearing?  No   Do you need help with transportation? No   Do you need help shopping? No   Do you need help preparing meals?  No   Do you need help with housework?  No   Do you need help with laundry? No   Do you need help taking your medications? No   Do you need help managing money? No   Do you ever drive or ride in a car without wearing a seat belt? No   Have you felt unusual stress, anger or loneliness in the last month? No   Who do you live with? Spouse   If you need help, do you have trouble finding someone available to you? No   Have you been bothered in the last four weeks by sexual problems? No   Do you have difficulty concentrating, remembering or making decisions? Yes         Does the patient have evidence of cognitive impairment? No    Asprin use counseling:Does not need ASA (and currently is not on it)    Age-appropriate Screening Schedule:  Refer to the list below for future screening recommendations based on patient's age, sex and/or medical conditions. Orders for these recommended tests are listed in the plan section. The patient has been provided with a written plan.    Health Maintenance   Topic Date Due   • TDAP/TD VACCINES (1 - Tdap) 05/08/1968   • ZOSTER VACCINE (1 of 2) 05/08/1999   • PNEUMOCOCCAL VACCINE (65+ HIGH RISK) (1 of 2 - PCV13) 05/08/2014   • COLONOSCOPY  10/19/2017   • INFLUENZA VACCINE  08/01/2019   • MAMMOGRAM  02/22/2020          The following portions of the patient's history were reviewed and updated as appropriate:   She  has a past medical history of Arthritis, Colon, diverticulosis, Diffuse large B cell lymphoma (CMS/HCC), Nonalcoholic hepatosteatosis, OAB (overactive bladder), and Wears glasses.  She  has  a past surgical history that includes Total abdominal hysterectomy; Bone marrow biopsy; Bone biopsy; Colonoscopy (2013); and Venous Access Device (Port) (N/A, 4/4/2019).  Her family history includes Breast cancer in her maternal grandmother and paternal grandmother; Diabetes in her brother, brother, and father; Glaucoma in her paternal grandfather; Hypertension in her brother, brother, mother, and sister; Pancreatic cancer in her mother; Prostate cancer in her father; Stroke in her maternal grandfather.  She  reports that she has never smoked. She has never used smokeless tobacco. She reports that she does not drink alcohol or use drugs.  Current Outpatient Medications on File Prior to Visit   Medication Sig   • CALCIUM PO Take 600 mg by mouth Daily.   • furosemide (LASIX) 20 MG tablet Take 1 tablet by mouth As Needed (fluid retention). For fluid retention   • meloxicam (MOBIC) 7.5 MG tablet Take 1 tablet by mouth Daily.   • Multiple Vitamins-Minerals (MULTIVITAMIN ADULT PO) Take 1 tablet by mouth Daily.   • omeprazole (priLOSEC) 20 MG capsule Take 1 capsule by mouth Daily. At bedtime   • ondansetron (ZOFRAN) 8 MG tablet Take 1 tablet by mouth 3 (Three) Times a Day As Needed for Nausea or Vomiting.   • [DISCONTINUED] lidocaine-prilocaine (EMLA) 2.5-2.5 % cream Apply  topically to the appropriate area as directed As Needed (45-60 minutes prior to port access.  Cover with saran/plastic wrap.). (Patient taking differently: Apply 1 application topically to the appropriate area as directed As Needed (45-60 minutes prior to port access.  Cover with saran/plastic wrap.).)   • [DISCONTINUED] oxybutynin XL (DITROPAN XL) 15 MG 24 hr tablet Take 1 tablet by mouth Daily.     No current facility-administered medications on file prior to visit.      She has No Known Allergies..    Outpatient Medications Prior to Visit   Medication Sig Dispense Refill   • CALCIUM PO Take 600 mg by mouth Daily.     • furosemide (LASIX) 20 MG tablet  Take 1 tablet by mouth As Needed (fluid retention). For fluid retention 10 tablet 0   • meloxicam (MOBIC) 7.5 MG tablet Take 1 tablet by mouth Daily. 30 tablet 0   • Multiple Vitamins-Minerals (MULTIVITAMIN ADULT PO) Take 1 tablet by mouth Daily.     • omeprazole (priLOSEC) 20 MG capsule Take 1 capsule by mouth Daily. At bedtime 90 capsule 3   • ondansetron (ZOFRAN) 8 MG tablet Take 1 tablet by mouth 3 (Three) Times a Day As Needed for Nausea or Vomiting. 30 tablet 5   • lidocaine-prilocaine (EMLA) 2.5-2.5 % cream Apply  topically to the appropriate area as directed As Needed (45-60 minutes prior to port access.  Cover with saran/plastic wrap.). (Patient taking differently: Apply 1 application topically to the appropriate area as directed As Needed (45-60 minutes prior to port access.  Cover with saran/plastic wrap.).) 30 g 3   • oxybutynin XL (DITROPAN XL) 15 MG 24 hr tablet Take 1 tablet by mouth Daily. 30 tablet 2     No facility-administered medications prior to visit.        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    Genitourinary:        No breast pain or lumps.   Musculoskeletal: Positive for arthralgias.   Psychiatric/Behavioral: Positive for sleep disturbance. Negative for dysphoric mood.       Compared to one year ago, the patient feels her physical health is better.  Compared to one year ago, the patient feels her mental health is better.    Reviewed chart for potential of high risk medication in the elderly: not applicable  Reviewed chart for potential of harmful drug interactions in the elderly:not applicable    CBC w/Diff   Lab Results   Component  "Value Date    WBC 2.90 (L) 08/23/2019    RBC 3.03 (L) 08/23/2019    HGB 9.9 (L) 08/23/2019    HCT 30.1 (L) 08/23/2019    MCV 99.4 (H) 08/23/2019    MCH 32.7 08/23/2019    MCHC 32.9 08/23/2019    RDW 14.6 08/23/2019    RDWSD 50.3 07/08/2019    MPV 6.2 08/23/2019     08/23/2019    NEUTRORELPCT 47.4 08/23/2019    LYMPHORELPCT 38.2 08/23/2019    MONORELPCT 14.4 (H) 08/23/2019    EOSRELPCT 11.5 (H) 07/08/2019    BASORELPCT 3.8 (H) 07/08/2019    AUTOIGPER 3.8 (H) 07/08/2019    NEUTROABS 1.40 (L) 08/23/2019    LYMPHSABS 1.10 08/23/2019    MONOSABS 0.40 08/23/2019    EOSABS 0.03 07/08/2019    BASOSABS 0.01 07/08/2019    AUTOIGNUM 0.01 07/08/2019    NRBC 0.0 07/08/2019     TSH Reflex to Free T4  Lab Results   Component Value Date    TSH 1.520 08/23/2019     CMP  Lab Results   Component Value Date    GLUCOSE 86 08/23/2019    BUN 13 08/23/2019    CREATININE 0.62 08/23/2019    EGFRIFNONA 95 08/23/2019    BCR 21.0 08/23/2019    K 4.3 08/23/2019    CO2 24.0 08/23/2019    CALCIUM 9.3 08/23/2019    ALBUMIN 4.30 08/23/2019    AST 32 08/23/2019    ALT 21 08/23/2019      Lipid Panel:  No results found for: CHOL, TRIG, HDL, VLDL, LDL        Objective         Vitals:    11/08/19 1347   BP: 110/72   BP Location: Left arm   Patient Position: Sitting   Cuff Size: Adult   Pulse: 98   SpO2: 98%   Weight: 64 kg (141 lb)   Height: 160 cm (63\")   PainSc: 0-No pain       Body mass index is 24.98 kg/m².  Discussed the patient's BMI with her. The BMI is in the acceptable range.    Physical Exam   Constitutional: She is oriented to person, place, and time. No distress.   HENT:   Right Ear: Tympanic membrane and ear canal normal.   Left Ear: Tympanic membrane and ear canal normal.   Nose: Nose normal.   Mouth/Throat: Oropharynx is clear and moist and mucous membranes are normal. No oral lesions.   Eyes: Right eye exhibits no discharge. Left eye exhibits no discharge.   Neck: Neck supple. No thyromegaly present.   Cardiovascular: Normal rate, " regular rhythm and normal heart sounds.   No murmur heard.  Pulmonary/Chest: Effort normal and breath sounds normal.   Abdominal: Soft. Bowel sounds are normal. There is no tenderness.   Musculoskeletal: She exhibits no edema.   Lymphadenopathy:        Head (right side): No submandibular, no preauricular and no posterior auricular adenopathy present.        Head (left side): No submandibular, no preauricular and no posterior auricular adenopathy present.     She has no cervical adenopathy.   Neurological: She is alert and oriented to person, place, and time.   Skin: Skin is warm and dry.   Psychiatric: She has a normal mood and affect. Her behavior is normal. Judgment and thought content normal.             Assessment/Plan   Medicare Risks and Personalized Health Plan  CMS Preventative Services Quick Reference  Advance Directive Discussion  Breast Cancer/Mammogram Screening  Immunizations Discussed/Encouraged (specific immunizations; Influenza )  Counseled on health maintenance topics and preventative care recommendations: Influenza vaccine, breast cancer screening    The above risks/problems have been discussed with the patient.  Pertinent information has been shared with the patient in the After Visit Summary.  Follow up plans and orders are seen below in the Assessment/Plan Section.    Diagnoses and all orders for this visit:    1. Well adult exam (Primary)    2. Need for vaccination  Patient obtain flu vaccine through work but records unavailable.  3. Visit for screening mammogram  Mammogram records requested.  She declined clinical breast exam today.  4. Gastroesophageal reflux disease, esophagitis presence not specified  Continue PPI.  5. Urinary frequency  -     oxybutynin XL (DITROPAN XL) 15 MG 24 hr tablet; Take 1 tablet by mouth Daily.  Dispense: 30 tablet; Refill: 11  Improved.  Continue oxybutynin.    Follow Up:  Return in about 1 year (around 11/8/2020) for Medicare Wellness.     An After Visit Summary  and PPPS were given to the patient.

## 2019-11-18 ENCOUNTER — LAB REQUISITION (OUTPATIENT)
Dept: LAB | Facility: HOSPITAL | Age: 69
End: 2019-11-18

## 2019-11-18 DIAGNOSIS — Z00.00 ROUTINE GENERAL MEDICAL EXAMINATION AT A HEALTH CARE FACILITY: ICD-10-CM

## 2019-11-18 DIAGNOSIS — C83.31 DIFFUSE LARGE B-CELL LYMPHOMA OF LYMPH NODES OF HEAD (HCC): Primary | ICD-10-CM

## 2019-11-18 PROCEDURE — 36415 COLL VENOUS BLD VENIPUNCTURE: CPT | Performed by: INTERNAL MEDICINE

## 2019-11-25 ENCOUNTER — OFFICE VISIT (OUTPATIENT)
Dept: ONCOLOGY | Facility: CLINIC | Age: 69
End: 2019-11-25

## 2019-11-25 ENCOUNTER — APPOINTMENT (OUTPATIENT)
Dept: ONCOLOGY | Facility: HOSPITAL | Age: 69
End: 2019-11-25

## 2019-11-25 VITALS
WEIGHT: 137.8 LBS | HEIGHT: 63 IN | BODY MASS INDEX: 24.41 KG/M2 | RESPIRATION RATE: 20 BRPM | HEART RATE: 71 BPM | DIASTOLIC BLOOD PRESSURE: 70 MMHG | OXYGEN SATURATION: 100 % | TEMPERATURE: 97.8 F | SYSTOLIC BLOOD PRESSURE: 120 MMHG

## 2019-11-25 DIAGNOSIS — C83.31 DIFFUSE LARGE B-CELL LYMPHOMA OF LYMPH NODES OF HEAD (HCC): Primary | ICD-10-CM

## 2019-11-25 PROCEDURE — 99214 OFFICE O/P EST MOD 30 MIN: CPT | Performed by: INTERNAL MEDICINE

## 2020-01-23 ENCOUNTER — TELEPHONE (OUTPATIENT)
Dept: ONCOLOGY | Facility: CLINIC | Age: 70
End: 2020-01-23

## 2020-01-23 NOTE — TELEPHONE ENCOUNTER
I gave [facility] my email address to receive records and scan to patient's chart per patient's request, however, I also advised them that they need to send this to her oral surgeon at  who is following her gum/dental issue.

## 2020-01-23 NOTE — TELEPHONE ENCOUNTER
[facility] called and this patient received X-rays yesterday and they want to share these digital results with you. They requested an e-mail to send the digital copies to since they can't fax them.    Please call : [phone]

## 2020-02-17 ENCOUNTER — LAB (OUTPATIENT)
Dept: LAB | Facility: HOSPITAL | Age: 70
End: 2020-02-17

## 2020-02-17 DIAGNOSIS — C83.31 DIFFUSE LARGE B-CELL LYMPHOMA OF LYMPH NODES OF HEAD (HCC): ICD-10-CM

## 2020-02-17 LAB
ALBUMIN SERPL-MCNC: 4.2 G/DL (ref 3.5–5.2)
ALBUMIN/GLOB SERPL: 1.5 G/DL
ALP SERPL-CCNC: 76 U/L (ref 39–117)
ALT SERPL W P-5'-P-CCNC: 16 U/L (ref 1–33)
ANION GAP SERPL CALCULATED.3IONS-SCNC: 11 MMOL/L (ref 5–15)
AST SERPL-CCNC: 24 U/L (ref 1–32)
BASOPHILS # BLD AUTO: 0.08 10*3/MM3 (ref 0–0.2)
BASOPHILS NFR BLD AUTO: 1.9 % (ref 0–1.5)
BILIRUB SERPL-MCNC: 0.6 MG/DL (ref 0.2–1.2)
BUN BLD-MCNC: 17 MG/DL (ref 8–23)
BUN/CREAT SERPL: 23.6 (ref 7–25)
CALCIUM SPEC-SCNC: 9.3 MG/DL (ref 8.6–10.5)
CHLORIDE SERPL-SCNC: 105 MMOL/L (ref 98–107)
CO2 SERPL-SCNC: 26 MMOL/L (ref 22–29)
CREAT BLD-MCNC: 0.72 MG/DL (ref 0.57–1)
DEPRECATED RDW RBC AUTO: 49.1 FL (ref 37–54)
EOSINOPHIL # BLD AUTO: 0.19 10*3/MM3 (ref 0–0.4)
EOSINOPHIL NFR BLD AUTO: 4.5 % (ref 0.3–6.2)
ERYTHROCYTE [DISTWIDTH] IN BLOOD BY AUTOMATED COUNT: 14.2 % (ref 12.3–15.4)
GFR SERPL CREATININE-BSD FRML MDRD: 80 ML/MIN/1.73
GLOBULIN UR ELPH-MCNC: 2.8 GM/DL
GLUCOSE BLD-MCNC: 89 MG/DL (ref 65–99)
HCT VFR BLD AUTO: 39 % (ref 34–46.6)
HGB BLD-MCNC: 12.9 G/DL (ref 12–15.9)
IMM GRANULOCYTES # BLD AUTO: 0.01 10*3/MM3 (ref 0–0.05)
IMM GRANULOCYTES NFR BLD AUTO: 0.2 % (ref 0–0.5)
LYMPHOCYTES # BLD AUTO: 1.26 10*3/MM3 (ref 0.7–3.1)
LYMPHOCYTES NFR BLD AUTO: 29.8 % (ref 19.6–45.3)
MCH RBC QN AUTO: 31.5 PG (ref 26.6–33)
MCHC RBC AUTO-ENTMCNC: 33.1 G/DL (ref 31.5–35.7)
MCV RBC AUTO: 95.1 FL (ref 79–97)
MONOCYTES # BLD AUTO: 0.44 10*3/MM3 (ref 0.1–0.9)
MONOCYTES NFR BLD AUTO: 10.4 % (ref 5–12)
NEUTROPHILS # BLD AUTO: 2.25 10*3/MM3 (ref 1.7–7)
NEUTROPHILS NFR BLD AUTO: 53.2 % (ref 42.7–76)
NRBC BLD AUTO-RTO: 0 /100 WBC (ref 0–0.2)
PLATELET # BLD AUTO: 272 10*3/MM3 (ref 140–450)
PMV BLD AUTO: 9.9 FL (ref 6–12)
POTASSIUM BLD-SCNC: 4.2 MMOL/L (ref 3.5–5.2)
PROT SERPL-MCNC: 7 G/DL (ref 6–8.5)
RBC # BLD AUTO: 4.1 10*6/MM3 (ref 3.77–5.28)
SODIUM BLD-SCNC: 142 MMOL/L (ref 136–145)
WBC NRBC COR # BLD: 4.23 10*3/MM3 (ref 3.4–10.8)

## 2020-02-17 PROCEDURE — 80053 COMPREHEN METABOLIC PANEL: CPT

## 2020-02-17 PROCEDURE — 36415 COLL VENOUS BLD VENIPUNCTURE: CPT

## 2020-02-17 PROCEDURE — 85025 COMPLETE CBC W/AUTO DIFF WBC: CPT

## 2020-02-25 ENCOUNTER — OFFICE VISIT (OUTPATIENT)
Dept: ONCOLOGY | Facility: CLINIC | Age: 70
End: 2020-02-25

## 2020-02-25 VITALS
RESPIRATION RATE: 14 BRPM | HEIGHT: 63 IN | SYSTOLIC BLOOD PRESSURE: 112 MMHG | TEMPERATURE: 97.3 F | OXYGEN SATURATION: 98 % | DIASTOLIC BLOOD PRESSURE: 66 MMHG | WEIGHT: 143 LBS | HEART RATE: 84 BPM | BODY MASS INDEX: 25.34 KG/M2

## 2020-02-25 DIAGNOSIS — C83.31 DIFFUSE LARGE B-CELL LYMPHOMA OF LYMPH NODES OF HEAD (HCC): Primary | ICD-10-CM

## 2020-02-25 PROCEDURE — 99214 OFFICE O/P EST MOD 30 MIN: CPT | Performed by: NURSE PRACTITIONER

## 2020-02-25 NOTE — PROGRESS NOTES
DATE OF VISIT: 2/25/2020    REASON FOR VISIT: Followup for mandible diffuse large B-cell lymphoma     HISTORY OF PRESENT ILLNESS: The patient is a very pleasant 69 y.o. 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  Staging workup revealed stage IIE disease.  Patient was started on definitive chemotherapy with R CHOP April 8, 2019.  The patient completed cycle #6 June 22, 2019.  The patient is here today for scheduled follow-up visit.    SUBJECTIVE: The patient is here today by herself. She has been doing well since her last visit. She is back to work and has had no recent illnesses. She denies fevers, chills, or night sweats. She has gained some weight. She continues to follow up with her dentist regarding management of her lower tooth looseness. She denies pain with chewing. She has no cough or shortness of breath.     PAST MEDICAL HISTORY/SOCIAL HISTORY/FAMILY HISTORY: Reviewed by me and unchanged from my documentation done on 02/25/20.    Review of Systems   Constitutional: Positive for fatigue. Negative for activity change, appetite change, chills, fever and unexpected weight change.   HENT: Negative for hearing loss, mouth sores, nosebleeds, sore throat and trouble swallowing.    Eyes: Negative for visual disturbance.   Respiratory: Negative for cough, chest tightness, shortness of breath and wheezing.    Cardiovascular: Negative for chest pain, palpitations and leg swelling.   Gastrointestinal: Negative for abdominal distention, abdominal pain, blood in stool, constipation, diarrhea, nausea, rectal pain and vomiting.   Endocrine: Negative for cold intolerance and heat intolerance.   Genitourinary: Positive for frequency. Negative for difficulty urinating, dysuria and urgency.   Musculoskeletal: Negative for arthralgias, back pain, gait problem, joint swelling and myalgias.   Skin: Negative for rash.   Neurological: Negative for dizziness,  "tremors, syncope, weakness, light-headedness, numbness and headaches.   Hematological: Negative for adenopathy. Does not bruise/bleed easily.   Psychiatric/Behavioral: Negative for confusion, sleep disturbance and suicidal ideas. The patient is not nervous/anxious.          Current Outpatient Medications:   •  CALCIUM PO, Take 600 mg by mouth Daily., Disp: , Rfl:   •  furosemide (LASIX) 20 MG tablet, Take 1 tablet by mouth As Needed (fluid retention). For fluid retention, Disp: 10 tablet, Rfl: 0  •  meloxicam (MOBIC) 7.5 MG tablet, Take 1 tablet by mouth Daily., Disp: 30 tablet, Rfl: 0  •  Multiple Vitamins-Minerals (MULTIVITAMIN ADULT PO), Take 1 tablet by mouth Daily., Disp: , Rfl:   •  omeprazole (priLOSEC) 20 MG capsule, Take 1 capsule by mouth Daily. At bedtime, Disp: 90 capsule, Rfl: 3  •  oxybutynin XL (DITROPAN XL) 15 MG 24 hr tablet, Take 1 tablet by mouth Daily., Disp: 30 tablet, Rfl: 11    PHYSICAL EXAMINATION:   /66   Pulse 84   Temp 97.3 °F (36.3 °C) (Temporal)   Resp 14   Ht 160 cm (62.99\")   Wt 64.9 kg (143 lb)   SpO2 98%   BMI 25.34 kg/m²    ECOG Performance Status: 1 - Symptomatic but completely ambulatory  General Appearance:  alert, cooperative, no apparent distress and appears stated age   Neurologic/Psychiatric: A&O x 3, gait steady, appropriate affect, strength 5/5 in all muscle groups   HEENT:  Normocephalic, without obvious abnormality, mucous membranes moist   Neck: Supple, symmetrical, trachea midline, no adenopathy;  No thyromegaly, masses, or tenderness   Lungs:   Clear to auscultation bilaterally; respirations regular, even, and unlabored bilaterally   Heart:  Regular rate and rhythm, no murmurs appreciated   Abdomen:   Soft, non-tender, non-distended and no organomegaly   Lymph nodes: No cervical, supraclavicular, inguinal or axillary adenopathy noted   Extremities: Normal, atraumatic; no clubbing, cyanosis, or edema    Skin: No rashes, ulcers, or suspicious lesions noted "     Lab on 02/17/2020   Component Date Value Ref Range Status   • Glucose 02/17/2020 89  65 - 99 mg/dL Final   • BUN 02/17/2020 17  8 - 23 mg/dL Final   • Creatinine 02/17/2020 0.72  0.57 - 1.00 mg/dL Final   • Sodium 02/17/2020 142  136 - 145 mmol/L Final   • Potassium 02/17/2020 4.2  3.5 - 5.2 mmol/L Final   • Chloride 02/17/2020 105  98 - 107 mmol/L Final   • CO2 02/17/2020 26.0  22.0 - 29.0 mmol/L Final   • Calcium 02/17/2020 9.3  8.6 - 10.5 mg/dL Final   • Total Protein 02/17/2020 7.0  6.0 - 8.5 g/dL Final   • Albumin 02/17/2020 4.20  3.50 - 5.20 g/dL Final   • ALT (SGPT) 02/17/2020 16  1 - 33 U/L Final   • AST (SGOT) 02/17/2020 24  1 - 32 U/L Final   • Alkaline Phosphatase 02/17/2020 76  39 - 117 U/L Final   • Total Bilirubin 02/17/2020 0.6  0.2 - 1.2 mg/dL Final   • eGFR Non African Amer 02/17/2020 80  >60 mL/min/1.73 Final   • Globulin 02/17/2020 2.8  gm/dL Final   • A/G Ratio 02/17/2020 1.5  g/dL Final   • BUN/Creatinine Ratio 02/17/2020 23.6  7.0 - 25.0 Final   • Anion Gap 02/17/2020 11.0  5.0 - 15.0 mmol/L Final   • WBC 02/17/2020 4.23  3.40 - 10.80 10*3/mm3 Final   • RBC 02/17/2020 4.10  3.77 - 5.28 10*6/mm3 Final   • Hemoglobin 02/17/2020 12.9  12.0 - 15.9 g/dL Final   • Hematocrit 02/17/2020 39.0  34.0 - 46.6 % Final   • MCV 02/17/2020 95.1  79.0 - 97.0 fL Final   • MCH 02/17/2020 31.5  26.6 - 33.0 pg Final   • MCHC 02/17/2020 33.1  31.5 - 35.7 g/dL Final   • RDW 02/17/2020 14.2  12.3 - 15.4 % Final   • RDW-SD 02/17/2020 49.1  37.0 - 54.0 fl Final   • MPV 02/17/2020 9.9  6.0 - 12.0 fL Final   • Platelets 02/17/2020 272  140 - 450 10*3/mm3 Final   • Neutrophil % 02/17/2020 53.2  42.7 - 76.0 % Final   • Lymphocyte % 02/17/2020 29.8  19.6 - 45.3 % Final   • Monocyte % 02/17/2020 10.4  5.0 - 12.0 % Final   • Eosinophil % 02/17/2020 4.5  0.3 - 6.2 % Final   • Basophil % 02/17/2020 1.9* 0.0 - 1.5 % Final   • Immature Grans % 02/17/2020 0.2  0.0 - 0.5 % Final   • Neutrophils, Absolute 02/17/2020 2.25  1.70 -  7.00 10*3/mm3 Final   • Lymphocytes, Absolute 02/17/2020 1.26  0.70 - 3.10 10*3/mm3 Final   • Monocytes, Absolute 02/17/2020 0.44  0.10 - 0.90 10*3/mm3 Final   • Eosinophils, Absolute 02/17/2020 0.19  0.00 - 0.40 10*3/mm3 Final   • Basophils, Absolute 02/17/2020 0.08  0.00 - 0.20 10*3/mm3 Final   • Immature Grans, Absolute 02/17/2020 0.01  0.00 - 0.05 10*3/mm3 Final   • nRBC 02/17/2020 0.0  0.0 - 0.2 /100 WBC Final        No results found.    ASSESSMENT: The patient is a very pleasant 69 y.o. female  with mandible diffuse large B-cell lymphoma.    PROBLEM LIST:  1. Mandible diffuse large B-cell lymphoma, stage IIE:  A.  Presented with gum pain and swelling  B.  CT facial bones done on February 26, 2019 revealed 2.3 cm lucency in the anterior mandible  C.  Status post biopsy done on March 7, 2019 consistent with diffuse large B-cell lymphoma  D.  Bone marrow biopsy done on March 25, 2019 no evidence of lymphoma involvement.  E.  Whole body PET scan done on March 28, 2019 revealed hypermetabolic activity in the right curtis-mandible as well as jugular cervical chains hypermetabolic lymphadenopathy level 2 and level 3.  F. Started chemotherapy with R-CHOP April 2019,status post 6 cycles, completed July 22, 2019.  2.  Osteoarthritis  3.  Chemotherapy-induced nausea  4.  Deep venous catheter  5.  Chemotherapy-induced constipation  6.  Thrombocytosis, reactive  7. Urinary frequency    PLAN:  1.  I reviewed the CT results with the patient. I reassured her there was no evidence of new or progressive disease.   2. I reviewed the lab results with the patient. I reassured her that her blood counts, kidney function, liver enzymes and electrolytes are all within normal limits.   3. We will see the patient back in 4 months with repeat labs including CBC and CMP as well as CT scans of neck, chest, abdomen, and pelvis.   4. The patient will continue follow up regarding planned dental procedures to manage her loose lower teeth  following biopsy of her mandible.   5. We will continue Mobic as needed for osteoarthritis.    6. She will continue oxybutynin 10 mg for over active bladder.   7. I asked the patient to call and return sooner with any concerning symptoms such as weight loss lymphadenopathy, fever, chills, or night sweats.     Migdalia Blanca, APRN  2/25/2020

## 2020-03-02 ENCOUNTER — TELEPHONE (OUTPATIENT)
Dept: FAMILY MEDICINE CLINIC | Facility: CLINIC | Age: 70
End: 2020-03-02

## 2020-03-04 ENCOUNTER — TELEPHONE (OUTPATIENT)
Dept: FAMILY MEDICINE CLINIC | Facility: CLINIC | Age: 70
End: 2020-03-04

## 2020-03-04 ENCOUNTER — LAB (OUTPATIENT)
Dept: LAB | Facility: HOSPITAL | Age: 70
End: 2020-03-04

## 2020-03-04 ENCOUNTER — TELEPHONE (OUTPATIENT)
Dept: ONCOLOGY | Facility: CLINIC | Age: 70
End: 2020-03-04

## 2020-03-04 DIAGNOSIS — R35.0 URINARY FREQUENCY: Primary | ICD-10-CM

## 2020-03-04 DIAGNOSIS — R35.0 URINARY FREQUENCY: ICD-10-CM

## 2020-03-04 LAB
BACTERIA UR QL AUTO: ABNORMAL /HPF
BILIRUB UR QL STRIP: NEGATIVE
CLARITY UR: CLEAR
COLOR UR: YELLOW
GLUCOSE UR STRIP-MCNC: NEGATIVE MG/DL
HGB UR QL STRIP.AUTO: NEGATIVE
HYALINE CASTS UR QL AUTO: ABNORMAL /LPF
KETONES UR QL STRIP: NEGATIVE
LEUKOCYTE ESTERASE UR QL STRIP.AUTO: ABNORMAL
NITRITE UR QL STRIP: NEGATIVE
PH UR STRIP.AUTO: 6.5 [PH] (ref 5–8)
PROT UR QL STRIP: NEGATIVE
RBC # UR: ABNORMAL /HPF
REF LAB TEST METHOD: ABNORMAL
SP GR UR STRIP: 1.01 (ref 1–1.03)
SQUAMOUS #/AREA URNS HPF: ABNORMAL /HPF
UROBILINOGEN UR QL STRIP: ABNORMAL
WBC UR QL AUTO: ABNORMAL /HPF

## 2020-03-04 PROCEDURE — 87077 CULTURE AEROBIC IDENTIFY: CPT

## 2020-03-04 PROCEDURE — 81001 URINALYSIS AUTO W/SCOPE: CPT

## 2020-03-04 PROCEDURE — 87186 SC STD MICRODIL/AGAR DIL: CPT

## 2020-03-04 PROCEDURE — 87086 URINE CULTURE/COLONY COUNT: CPT

## 2020-03-04 NOTE — TELEPHONE ENCOUNTER
Left detailed VM to call back and schedule appointment. Patient will need to be seen. Office # given to schedule.

## 2020-03-04 NOTE — TELEPHONE ENCOUNTER
Patient called c/o urinary frequency and burning.  She feels like she has a UTI and would like to check an UA.  Discussed with ANGELA Lilly.  Ok to order UA.  Patient notified and instructed to go to the lab for UA.  Verbalized understanding.

## 2020-03-04 NOTE — TELEPHONE ENCOUNTER
Pt calling in today thinks she has a uti, frequent urination , pain, and burning.  Pt wanted to see if she could come in and give a UA.    Please advise and give call [phone] (H)

## 2020-03-06 ENCOUNTER — TELEPHONE (OUTPATIENT)
Dept: ONCOLOGY | Facility: CLINIC | Age: 70
End: 2020-03-06

## 2020-03-06 LAB — BACTERIA SPEC AEROBE CULT: ABNORMAL

## 2020-03-06 RX ORDER — CIPROFLOXACIN 500 MG/1
500 TABLET, FILM COATED ORAL 2 TIMES DAILY
Qty: 14 TABLET | Refills: 0 | Status: SHIPPED | OUTPATIENT
Start: 2020-03-06 | End: 2020-03-16

## 2020-03-06 NOTE — TELEPHONE ENCOUNTER
Pt notified of UA results showing UTI. Will start her on treatment using Cipro 500 mg twice a day for 7 days. I instructed her to take with food. RX sent to her pharmacy. I encouraged her to increase fluid intake and to call if symptoms to do not improve.

## 2020-05-28 DIAGNOSIS — R35.0 URINARY FREQUENCY: ICD-10-CM

## 2020-05-28 RX ORDER — OXYBUTYNIN CHLORIDE 15 MG/1
TABLET, EXTENDED RELEASE ORAL
Qty: 30 TABLET | Refills: 10 | OUTPATIENT
Start: 2020-05-28

## 2020-05-28 RX ORDER — OXYBUTYNIN CHLORIDE 15 MG/1
15 TABLET, EXTENDED RELEASE ORAL DAILY
Qty: 30 TABLET | Refills: 5 | Status: SHIPPED | OUTPATIENT
Start: 2020-05-28 | End: 2020-11-18 | Stop reason: SDUPTHER

## 2020-06-02 RX ORDER — OMEPRAZOLE 20 MG/1
CAPSULE, DELAYED RELEASE ORAL
Qty: 90 CAPSULE | Refills: 2 | Status: SHIPPED | OUTPATIENT
Start: 2020-06-02 | End: 2021-03-23

## 2020-06-19 ENCOUNTER — LAB (OUTPATIENT)
Dept: LAB | Facility: HOSPITAL | Age: 70
End: 2020-06-19

## 2020-06-19 DIAGNOSIS — C83.31 DIFFUSE LARGE B-CELL LYMPHOMA OF LYMPH NODES OF HEAD (HCC): ICD-10-CM

## 2020-06-19 LAB
ALBUMIN SERPL-MCNC: 4.3 G/DL (ref 3.5–5.2)
ALBUMIN/GLOB SERPL: 1.4 G/DL
ALP SERPL-CCNC: 78 U/L (ref 39–117)
ALT SERPL W P-5'-P-CCNC: 19 U/L (ref 1–33)
ANION GAP SERPL CALCULATED.3IONS-SCNC: 12 MMOL/L (ref 5–15)
AST SERPL-CCNC: 34 U/L (ref 1–32)
BASOPHILS # BLD AUTO: 0.08 10*3/MM3 (ref 0–0.2)
BASOPHILS NFR BLD AUTO: 1.6 % (ref 0–1.5)
BILIRUB SERPL-MCNC: 0.9 MG/DL (ref 0.2–1.2)
BUN BLD-MCNC: 17 MG/DL (ref 8–23)
BUN/CREAT SERPL: 22.1 (ref 7–25)
CALCIUM SPEC-SCNC: 9.7 MG/DL (ref 8.6–10.5)
CHLORIDE SERPL-SCNC: 104 MMOL/L (ref 98–107)
CO2 SERPL-SCNC: 25 MMOL/L (ref 22–29)
CREAT BLD-MCNC: 0.77 MG/DL (ref 0.57–1)
DEPRECATED RDW RBC AUTO: 48.9 FL (ref 37–54)
EOSINOPHIL # BLD AUTO: 0.22 10*3/MM3 (ref 0–0.4)
EOSINOPHIL NFR BLD AUTO: 4.4 % (ref 0.3–6.2)
ERYTHROCYTE [DISTWIDTH] IN BLOOD BY AUTOMATED COUNT: 14.2 % (ref 12.3–15.4)
GFR SERPL CREATININE-BSD FRML MDRD: 74 ML/MIN/1.73
GLOBULIN UR ELPH-MCNC: 3 GM/DL
GLUCOSE BLD-MCNC: 84 MG/DL (ref 65–99)
HCT VFR BLD AUTO: 43.9 % (ref 34–46.6)
HGB BLD-MCNC: 14.4 G/DL (ref 12–15.9)
IMM GRANULOCYTES # BLD AUTO: 0.01 10*3/MM3 (ref 0–0.05)
IMM GRANULOCYTES NFR BLD AUTO: 0.2 % (ref 0–0.5)
LYMPHOCYTES # BLD AUTO: 1.24 10*3/MM3 (ref 0.7–3.1)
LYMPHOCYTES NFR BLD AUTO: 24.6 % (ref 19.6–45.3)
MCH RBC QN AUTO: 30.8 PG (ref 26.6–33)
MCHC RBC AUTO-ENTMCNC: 32.8 G/DL (ref 31.5–35.7)
MCV RBC AUTO: 93.8 FL (ref 79–97)
MONOCYTES # BLD AUTO: 0.48 10*3/MM3 (ref 0.1–0.9)
MONOCYTES NFR BLD AUTO: 9.5 % (ref 5–12)
NEUTROPHILS # BLD AUTO: 3.02 10*3/MM3 (ref 1.7–7)
NEUTROPHILS NFR BLD AUTO: 59.7 % (ref 42.7–76)
NRBC BLD AUTO-RTO: 0 /100 WBC (ref 0–0.2)
PLATELET # BLD AUTO: 353 10*3/MM3 (ref 140–450)
PMV BLD AUTO: 9.9 FL (ref 6–12)
POTASSIUM BLD-SCNC: 4.6 MMOL/L (ref 3.5–5.2)
PROT SERPL-MCNC: 7.3 G/DL (ref 6–8.5)
RBC # BLD AUTO: 4.68 10*6/MM3 (ref 3.77–5.28)
SODIUM BLD-SCNC: 141 MMOL/L (ref 136–145)
WBC NRBC COR # BLD: 5.05 10*3/MM3 (ref 3.4–10.8)

## 2020-06-19 PROCEDURE — 36415 COLL VENOUS BLD VENIPUNCTURE: CPT

## 2020-06-19 PROCEDURE — 80053 COMPREHEN METABOLIC PANEL: CPT

## 2020-06-19 PROCEDURE — 85025 COMPLETE CBC W/AUTO DIFF WBC: CPT

## 2020-06-30 ENCOUNTER — OFFICE VISIT (OUTPATIENT)
Dept: ONCOLOGY | Facility: CLINIC | Age: 70
End: 2020-06-30

## 2020-06-30 VITALS
OXYGEN SATURATION: 99 % | RESPIRATION RATE: 16 BRPM | TEMPERATURE: 98.4 F | DIASTOLIC BLOOD PRESSURE: 59 MMHG | BODY MASS INDEX: 27.29 KG/M2 | HEART RATE: 78 BPM | SYSTOLIC BLOOD PRESSURE: 103 MMHG | WEIGHT: 154 LBS | HEIGHT: 63 IN

## 2020-06-30 DIAGNOSIS — C83.31 DIFFUSE LARGE B-CELL LYMPHOMA OF LYMPH NODES OF HEAD (HCC): Primary | ICD-10-CM

## 2020-06-30 PROCEDURE — 99214 OFFICE O/P EST MOD 30 MIN: CPT | Performed by: INTERNAL MEDICINE

## 2020-06-30 NOTE — ACP (ADVANCE CARE PLANNING)
Advance Care Planning   ACP discussion was held with the patient during this visit. Patient does not have an advance directive, declines further assistance.

## 2020-06-30 NOTE — PROGRESS NOTES
DATE OF VISIT: 6/30/2020    REASON FOR VISIT: Followup for mandible diffuse large B-cell lymphoma     HISTORY OF PRESENT ILLNESS: The patient is a very pleasant 70 y.o. 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  Staging workup revealed stage IIE disease.  Patient was started on definitive chemotherapy with R CHOP April 8, 2019.  The patient completed cycle #6 June 22, 2019.  The patient is here today for scheduled follow-up visit.    SUBJECTIVE: The patient is here today by herself.  She has been doing fairly well.  She then for chills night sweats.  She is anxious about the scan results.    PAST MEDICAL HISTORY/SOCIAL HISTORY/FAMILY HISTORY: Reviewed by me and unchanged from my documentation done on 06/30/20.    Review of Systems   Constitutional: Positive for fatigue. Negative for activity change, appetite change, chills, fever and unexpected weight change.   HENT: Negative for hearing loss, mouth sores, nosebleeds, sore throat and trouble swallowing.    Eyes: Negative for visual disturbance.   Respiratory: Negative for cough, chest tightness, shortness of breath and wheezing.    Cardiovascular: Negative for chest pain, palpitations and leg swelling.   Gastrointestinal: Negative for abdominal distention, abdominal pain, blood in stool, constipation, diarrhea, nausea, rectal pain and vomiting.   Endocrine: Negative for cold intolerance and heat intolerance.   Genitourinary: Positive for frequency. Negative for difficulty urinating, dysuria and urgency.   Musculoskeletal: Negative for arthralgias, back pain, gait problem, joint swelling and myalgias.   Skin: Negative for rash.   Neurological: Negative for dizziness, tremors, syncope, weakness, light-headedness, numbness and headaches.   Hematological: Negative for adenopathy. Does not bruise/bleed easily.   Psychiatric/Behavioral: Negative for confusion, sleep disturbance and suicidal ideas. The  "patient is not nervous/anxious.          Current Outpatient Medications:   •  CALCIUM PO, Take 600 mg by mouth Daily., Disp: , Rfl:   •  furosemide (LASIX) 20 MG tablet, Take 1 tablet by mouth As Needed (fluid retention). For fluid retention, Disp: 10 tablet, Rfl: 0  •  meloxicam (MOBIC) 7.5 MG tablet, Take 1 tablet by mouth Daily., Disp: 30 tablet, Rfl: 0  •  Multiple Vitamins-Minerals (MULTIVITAMIN ADULT PO), Take 1 tablet by mouth Daily., Disp: , Rfl:   •  omeprazole (priLOSEC) 20 MG capsule, TAKE ONE CAPSULE BY MOUTH EVERY NIGHT AT BEDTIME, Disp: 90 capsule, Rfl: 2  •  oxybutynin XL (DITROPAN XL) 15 MG 24 hr tablet, Take 1 tablet by mouth Daily., Disp: 30 tablet, Rfl: 5    PHYSICAL EXAMINATION:   /59   Pulse 78   Temp 98.4 °F (36.9 °C) (Temporal)   Resp 16   Ht 160 cm (62.99\")   Wt 69.9 kg (154 lb)   SpO2 99%   BMI 27.29 kg/m²    ECOG Performance Status: 1 - Symptomatic but completely ambulatory  General Appearance:  alert, cooperative, no apparent distress and appears stated age   Neurologic/Psychiatric: A&O x 3, gait steady, appropriate affect, strength 5/5 in all muscle groups   HEENT:  Normocephalic, without obvious abnormality, mucous membranes moist   Neck: Supple, symmetrical, trachea midline, no adenopathy;  No thyromegaly, masses, or tenderness   Lungs:   Clear to auscultation bilaterally; respirations regular, even, and unlabored bilaterally   Heart:  Regular rate and rhythm, no murmurs appreciated   Abdomen:   Soft, non-tender, non-distended and no organomegaly   Lymph nodes: No cervical, supraclavicular, inguinal or axillary adenopathy noted   Extremities: Normal, atraumatic; no clubbing, cyanosis, or edema    Skin: No rashes, ulcers, or suspicious lesions noted     Lab on 06/19/2020   Component Date Value Ref Range Status   • Glucose 06/19/2020 84  65 - 99 mg/dL Final   • BUN 06/19/2020 17  8 - 23 mg/dL Final   • Creatinine 06/19/2020 0.77  0.57 - 1.00 mg/dL Final   • Sodium 06/19/2020 " 141  136 - 145 mmol/L Final   • Potassium 06/19/2020 4.6  3.5 - 5.2 mmol/L Final   • Chloride 06/19/2020 104  98 - 107 mmol/L Final   • CO2 06/19/2020 25.0  22.0 - 29.0 mmol/L Final   • Calcium 06/19/2020 9.7  8.6 - 10.5 mg/dL Final   • Total Protein 06/19/2020 7.3  6.0 - 8.5 g/dL Final   • Albumin 06/19/2020 4.30  3.50 - 5.20 g/dL Final   • ALT (SGPT) 06/19/2020 19  1 - 33 U/L Final   • AST (SGOT) 06/19/2020 34* 1 - 32 U/L Final   • Alkaline Phosphatase 06/19/2020 78  39 - 117 U/L Final   • Total Bilirubin 06/19/2020 0.9  0.2 - 1.2 mg/dL Final   • eGFR Non African Amer 06/19/2020 74  >60 mL/min/1.73 Final   • Globulin 06/19/2020 3.0  gm/dL Final   • A/G Ratio 06/19/2020 1.4  g/dL Final   • BUN/Creatinine Ratio 06/19/2020 22.1  7.0 - 25.0 Final   • Anion Gap 06/19/2020 12.0  5.0 - 15.0 mmol/L Final   • WBC 06/19/2020 5.05  3.40 - 10.80 10*3/mm3 Final   • RBC 06/19/2020 4.68  3.77 - 5.28 10*6/mm3 Final   • Hemoglobin 06/19/2020 14.4  12.0 - 15.9 g/dL Final   • Hematocrit 06/19/2020 43.9  34.0 - 46.6 % Final   • MCV 06/19/2020 93.8  79.0 - 97.0 fL Final   • MCH 06/19/2020 30.8  26.6 - 33.0 pg Final   • MCHC 06/19/2020 32.8  31.5 - 35.7 g/dL Final   • RDW 06/19/2020 14.2  12.3 - 15.4 % Final   • RDW-SD 06/19/2020 48.9  37.0 - 54.0 fl Final   • MPV 06/19/2020 9.9  6.0 - 12.0 fL Final   • Platelets 06/19/2020 353  140 - 450 10*3/mm3 Final   • Neutrophil % 06/19/2020 59.7  42.7 - 76.0 % Final   • Lymphocyte % 06/19/2020 24.6  19.6 - 45.3 % Final   • Monocyte % 06/19/2020 9.5  5.0 - 12.0 % Final   • Eosinophil % 06/19/2020 4.4  0.3 - 6.2 % Final   • Basophil % 06/19/2020 1.6* 0.0 - 1.5 % Final   • Immature Grans % 06/19/2020 0.2  0.0 - 0.5 % Final   • Neutrophils, Absolute 06/19/2020 3.02  1.70 - 7.00 10*3/mm3 Final   • Lymphocytes, Absolute 06/19/2020 1.24  0.70 - 3.10 10*3/mm3 Final   • Monocytes, Absolute 06/19/2020 0.48  0.10 - 0.90 10*3/mm3 Final   • Eosinophils, Absolute 06/19/2020 0.22  0.00 - 0.40 10*3/mm3 Final    • Basophils, Absolute 06/19/2020 0.08  0.00 - 0.20 10*3/mm3 Final   • Immature Grans, Absolute 06/19/2020 0.01  0.00 - 0.05 10*3/mm3 Final   • nRBC 06/19/2020 0.0  0.0 - 0.2 /100 WBC Final        No results found.    ASSESSMENT: The patient is a very pleasant 70 y.o. female  with mandible diffuse large B-cell lymphoma.    PROBLEM LIST:  1. Mandible diffuse large B-cell lymphoma, stage IIE:  A.  Presented with gum pain and swelling  B.  CT facial bones done on February 26, 2019 revealed 2.3 cm lucency in the anterior mandible  C.  Status post biopsy done on March 7, 2019 consistent with diffuse large B-cell lymphoma  D.  Bone marrow biopsy done on March 25, 2019 no evidence of lymphoma involvement.  E.  Whole body PET scan done on March 28, 2019 revealed hypermetabolic activity in the right curtis-mandible as well as jugular cervical chains hypermetabolic lymphadenopathy level 2 and level 3.  F. Started chemotherapy with R-CHOP April 2019,status post 6 cycles, completed July 22, 2019.  2.  Osteoarthritis  3.  Chemotherapy-induced nausea  4.  Deep venous catheter  5.  Chemotherapy-induced constipation  6.  Thrombocytosis, reactive  7. Urinary frequency    PLAN:  1.  I reviewed the CT results with the patient from June 23, 2020. I reassured her there was no evidence of new or progressive disease.   2. I reviewed the lab results with the patient. I reassured her that her blood counts, kidney function, liver enzymes and electrolytes are all within normal limits.   3. We will see the patient back in 4 months with repeat labs including CBC and CMP as well as CT scans of neck, chest, abdomen, and pelvis.   4. The patient will continue follow up regarding planned dental procedures to manage her loose lower teeth following biopsy of her mandible.   5. We will continue Mobic as needed for osteoarthritis.    6. She will continue oxybutynin 10 mg for over active bladder.   7. I asked the patient to call and return sooner with  any concerning symptoms such as weight loss lymphadenopathy, fever, chills, or night sweats.     Joan Zuluaga MD  6/30/2020

## 2020-09-18 ENCOUNTER — OFFICE VISIT (OUTPATIENT)
Dept: FAMILY MEDICINE CLINIC | Facility: CLINIC | Age: 70
End: 2020-09-18

## 2020-09-18 VITALS
OXYGEN SATURATION: 98 % | BODY MASS INDEX: 27.68 KG/M2 | DIASTOLIC BLOOD PRESSURE: 70 MMHG | SYSTOLIC BLOOD PRESSURE: 120 MMHG | HEIGHT: 63 IN | HEART RATE: 88 BPM | WEIGHT: 156.2 LBS

## 2020-09-18 DIAGNOSIS — N32.89 BLADDER SPASM: ICD-10-CM

## 2020-09-18 DIAGNOSIS — R32 URINARY INCONTINENCE, UNSPECIFIED TYPE: ICD-10-CM

## 2020-09-18 DIAGNOSIS — R35.0 URINARY FREQUENCY: Primary | ICD-10-CM

## 2020-09-18 LAB
BILIRUB BLD-MCNC: NEGATIVE MG/DL
CLARITY, POC: ABNORMAL
COLOR UR: ABNORMAL
GLUCOSE UR STRIP-MCNC: NEGATIVE MG/DL
KETONES UR QL: NEGATIVE
LEUKOCYTE EST, POC: NEGATIVE
NITRITE UR-MCNC: NEGATIVE MG/ML
PH UR: 6 [PH] (ref 5–8)
PROT UR STRIP-MCNC: NEGATIVE MG/DL
RBC # UR STRIP: ABNORMAL /UL
SP GR UR: 1.01 (ref 1–1.03)
UROBILINOGEN UR QL: NORMAL

## 2020-09-18 PROCEDURE — 81003 URINALYSIS AUTO W/O SCOPE: CPT | Performed by: INTERNAL MEDICINE

## 2020-09-18 PROCEDURE — 99214 OFFICE O/P EST MOD 30 MIN: CPT | Performed by: INTERNAL MEDICINE

## 2020-09-18 RX ORDER — FLUCONAZOLE 150 MG/1
TABLET ORAL
Qty: 2 TABLET | Refills: 0 | Status: SHIPPED | OUTPATIENT
Start: 2020-09-18 | End: 2020-10-15

## 2020-09-18 NOTE — PROGRESS NOTES
Chief Complaint   Patient presents with   • Urinary Tract Infection     x2 days       HPI:  Lanette Simpson is a 70 y.o. female who presents today for burning with urination and increased frequency and incontinence for the last 2 days.  She reports this feels very similar to her previous UTI earlier this year.  She denies any fevers or chills.  She is taking oxybutynin daily for incontinence.    ROS:  Constitutional: no fevers, night sweats or unexplained weight loss  Eyes: no vision changes  ENT: no runny nose, ear pain, sore throat  Cardio: no chest pain, palpitations  Pulm: no shortness of breath, wheezing, or cough  GI: no abdominal pain or changes in bowel movements  : no difficulty urinating  MSK: no difficulty ambulating, no joint pain  Neuro: no weakness, dizziness or headache  Psych: no trouble sleeping  Endo: no change in appetite      Past Medical History:   Diagnosis Date   • Arthritis    • Colon, diverticulosis    • Degenerative arthritis of spine    • Diffuse large B cell lymphoma (CMS/HCC)    • Nonalcoholic hepatosteatosis    • OAB (overactive bladder)    • Wears glasses       Family History   Problem Relation Age of Onset   • Pancreatic cancer Mother    • Hypertension Mother    • Diabetes Father    • Prostate cancer Father    • Hypertension Sister    • Diabetes Brother    • Hypertension Brother    • Breast cancer Maternal Grandmother    • Stroke Maternal Grandfather    • Breast cancer Paternal Grandmother    • Glaucoma Paternal Grandfather    • Diabetes Brother    • Hypertension Brother       Social History     Socioeconomic History   • Marital status:      Spouse name: Jay   • Number of children: 3   • Years of education: Not on file   • Highest education level: Not on file   Occupational History   • 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    3 kids.    10 gk.    Working PT    Exercise: active, no formal    Dental: due, does not like the dentist    Eye: utd, glasses      No Known Allergies     There is no immunization history on file for this patient.     PE:  Vitals:    09/18/20 1149   BP: 120/70   Pulse: 88   SpO2: 98%      Body mass index is 27.67 kg/m².    Gen Appearance: NAD  HEENT: Normocephalic, PERRLA, no thyromegaly, trache midline  Heart: RRR, normal S1 and S2, no murmur  Lungs: CTA b/l, no wheezing, no crackles  Abdomen: Soft, non-tender, non-distended, no guarding and BSx4  MSK: Moves all extremities well, normal gait, no peripheral edema  Pulses: Palpable and equal b/l  Lymph nodes: No palpable lymphadenopathy   Neuro: No focal deficits      Current Outpatient Medications   Medication Sig Dispense Refill   • CALCIUM PO Take 600 mg by mouth Daily.     • furosemide (LASIX) 20 MG tablet Take 1 tablet by mouth As Needed (fluid retention). For fluid retention 10 tablet 0   • meloxicam (MOBIC) 7.5 MG tablet Take 1 tablet by mouth Daily. 30 tablet 0   • Multiple Vitamins-Minerals (MULTIVITAMIN ADULT PO) Take 1 tablet by mouth Daily.     • omeprazole (priLOSEC) 20 MG capsule TAKE ONE CAPSULE BY MOUTH EVERY NIGHT AT BEDTIME 90 capsule 2   • oxybutynin XL (DITROPAN XL) 15 MG 24 hr tablet Take 1 tablet by mouth Daily. 30 tablet 5   • fluconazole (DIFLUCAN) 150 MG tablet Take one tab once, repeat dose dose 72 hours later if symptoms persist. 2 tablet 0     No current facility-administered medications for this visit.         Lanette was seen today for urinary tract infection.  Point-of-care urinalysis was negative for everything except for blood.  She did have a prior enterococcus UTI back in March.  We will hold off on antibiotics at this time with normal urinalysis.  Will be sending for culture just in case.  She  would like to trial treating yeast infection over the weekend with fluconazole.  I recommend calling or return to clinic if no improvement by next week.  Consider urology referral versus changing/increasing urinary incontinence medication if urinalysis continues to be normal.  Continue to take oxybutynin 15 mg XL daily for now.    Diagnoses and all orders for this visit:    Urinary frequency  -     POCT urinalysis dipstick, automated     Urinary incontinence, unspecified type    Bladder spasm    Other orders  -     fluconazole (DIFLUCAN) 150 MG tablet; Take one tab once, repeat dose dose 72 hours later if symptoms persist.         No follow-ups on file.     Please note that portions of this document were completed with a voice recognition program. Efforts were made to edit the dictations, but occasionally words are mis-transcribed.

## 2020-09-20 LAB
BACTERIA UR CULT: NO GROWTH
BACTERIA UR CULT: NORMAL

## 2020-10-15 ENCOUNTER — RESULTS ENCOUNTER (OUTPATIENT)
Dept: FAMILY MEDICINE CLINIC | Facility: CLINIC | Age: 70
End: 2020-10-15

## 2020-10-15 ENCOUNTER — OFFICE VISIT (OUTPATIENT)
Dept: FAMILY MEDICINE CLINIC | Facility: CLINIC | Age: 70
End: 2020-10-15

## 2020-10-15 VITALS
HEIGHT: 63 IN | OXYGEN SATURATION: 98 % | HEART RATE: 104 BPM | WEIGHT: 159 LBS | DIASTOLIC BLOOD PRESSURE: 76 MMHG | BODY MASS INDEX: 28.17 KG/M2 | SYSTOLIC BLOOD PRESSURE: 126 MMHG

## 2020-10-15 DIAGNOSIS — N30.00 ACUTE CYSTITIS WITHOUT HEMATURIA: ICD-10-CM

## 2020-10-15 DIAGNOSIS — B95.2 ENTEROCOCCUS FAECALIS INFECTION: Primary | ICD-10-CM

## 2020-10-15 DIAGNOSIS — R30.0 DYSURIA: ICD-10-CM

## 2020-10-15 DIAGNOSIS — R30.0 DYSURIA: Primary | ICD-10-CM

## 2020-10-15 PROCEDURE — 99213 OFFICE O/P EST LOW 20 MIN: CPT | Performed by: NURSE PRACTITIONER

## 2020-10-15 PROCEDURE — 81003 URINALYSIS AUTO W/O SCOPE: CPT | Performed by: NURSE PRACTITIONER

## 2020-10-15 RX ORDER — CEPHALEXIN 500 MG/1
500 CAPSULE ORAL 2 TIMES DAILY
Qty: 14 CAPSULE | Refills: 0 | Status: SHIPPED | OUTPATIENT
Start: 2020-10-15 | End: 2020-10-22

## 2020-10-15 NOTE — PROGRESS NOTES
"Subjective   Lanette Simpson is a 70 y.o. female.   Chief Complaint   Patient presents with   • Difficulty Urinating     x a few days        History of Present Illness   Patient is here with complaint of urinary frequency, urgency, and pain with urination. X 2  Days. Patient was seen a month ago for similar complaint, urine culture was No Growth. Patient does have urinary stress/urge incontinence and wears a pad     The following portions of the patient's history were reviewed and updated as appropriate: allergies, current medications and problem list.    Review of Systems   Constitutional: Negative for chills and fever.   Gastrointestinal: Negative for abdominal pain, nausea and vomiting.   Genitourinary: Positive for difficulty urinating, dysuria, frequency and urgency. Negative for hematuria.        Incontinence urinary       Objective   Physical Exam  Vitals signs reviewed.   Constitutional:       General: She is not in acute distress.     Appearance: Normal appearance. She is not ill-appearing, toxic-appearing or diaphoretic.   Cardiovascular:      Rate and Rhythm: Normal rate and regular rhythm.      Heart sounds: Normal heart sounds.   Pulmonary:      Effort: Pulmonary effort is normal. No respiratory distress.      Breath sounds: No wheezing.   Abdominal:      Palpations: Abdomen is soft.      Tenderness: There is no abdominal tenderness. There is no right CVA tenderness or left CVA tenderness.   Skin:     General: Skin is warm and dry.   Neurological:      Mental Status: She is alert and oriented to person, place, and time.   Psychiatric:         Mood and Affect: Mood normal.         Thought Content: Thought content normal.       /76   Pulse 104   Ht 160 cm (63\")   Wt 72.1 kg (159 lb)   SpO2 98%   BMI 28.17 kg/m²     Assessment/Plan   Diagnoses and all orders for this visit:    1. Dysuria (Primary)  -     POCT urinalysis dipstick, automated  -     Urine Culture - Urine, Urine, Clean Catch; " Future  -     cephalexin (Keflex) 500 MG capsule; Take 1 capsule by mouth 2 (Two) Times a Day for 7 days.  Dispense: 14 capsule; Refill: 0                  Results for orders placed or performed in visit on 10/15/20   POCT urinalysis dipstick, automated    Specimen: Urine   Result Value Ref Range    Color Yellow Yellow, Straw, Dark Yellow, Kelly    Clarity, UA Cloudy (A) Clear    Specific Gravity  1.015 1.005 - 1.030    pH, Urine 6.0 5.0 - 8.0    Leukocytes Large (3+) (A) Negative    Nitrite, UA Negative Negative    Protein, POC Trace (A) Negative mg/dL    Glucose, UA Negative Negative, 1000 mg/dL (3+) mg/dL    Ketones, UA Negative Negative    Urobilinogen, UA Normal Normal    Bilirubin Negative Negative    Blood, UA 1+ (A) Negative     UA positive for leukocytes and blood, send urine for culture. Keflex prescribed.   Advised cotton underwear, change pads when wet, wipe front to back. Drink mainly water.  Patient was encouraged to keep me informed of any acute changes, lack of improvement, or any new concerning symptoms.

## 2020-10-19 ENCOUNTER — TELEPHONE (OUTPATIENT)
Dept: ONCOLOGY | Facility: CLINIC | Age: 70
End: 2020-10-19

## 2020-10-19 LAB
BACTERIA UR CULT: ABNORMAL
BACTERIA UR CULT: ABNORMAL
OTHER ANTIBIOTIC SUSC ISLT: ABNORMAL

## 2020-10-19 RX ORDER — NITROFURANTOIN 25; 75 MG/1; MG/1
100 CAPSULE ORAL 2 TIMES DAILY
Qty: 14 CAPSULE | Refills: 0 | Status: SHIPPED | OUTPATIENT
Start: 2020-10-19 | End: 2020-10-26

## 2020-10-19 NOTE — TELEPHONE ENCOUNTER
Migdalia Blanca wanted patient to get CT before she sees her on 11/4.  She usually has it done at [facility].  Is she supposed to schedule this herself?  She hasn't heard from anyone about it.    [phone]

## 2020-10-19 NOTE — TELEPHONE ENCOUNTER
Spoke with patient and she is scheduled for 10/29/20 at [facility] to arrive at 12:45pm No Prep.

## 2020-10-26 ENCOUNTER — LAB (OUTPATIENT)
Dept: LAB | Facility: HOSPITAL | Age: 70
End: 2020-10-26

## 2020-10-26 DIAGNOSIS — C83.31 DIFFUSE LARGE B-CELL LYMPHOMA OF LYMPH NODES OF HEAD (HCC): ICD-10-CM

## 2020-10-26 LAB
ALBUMIN SERPL-MCNC: 4.4 G/DL (ref 3.5–5.2)
ALBUMIN/GLOB SERPL: 1.8 G/DL
ALP SERPL-CCNC: 72 U/L (ref 39–117)
ALT SERPL W P-5'-P-CCNC: 20 U/L (ref 1–33)
ANION GAP SERPL CALCULATED.3IONS-SCNC: 10 MMOL/L (ref 5–15)
AST SERPL-CCNC: 26 U/L (ref 1–32)
BASOPHILS # BLD AUTO: 0.1 10*3/MM3 (ref 0–0.2)
BASOPHILS NFR BLD AUTO: 1.8 % (ref 0–1.5)
BILIRUB SERPL-MCNC: 0.5 MG/DL (ref 0–1.2)
BUN SERPL-MCNC: 16 MG/DL (ref 8–23)
BUN/CREAT SERPL: 24.2 (ref 7–25)
CALCIUM SPEC-SCNC: 9.5 MG/DL (ref 8.6–10.5)
CHLORIDE SERPL-SCNC: 106 MMOL/L (ref 98–107)
CO2 SERPL-SCNC: 26 MMOL/L (ref 22–29)
CREAT SERPL-MCNC: 0.66 MG/DL (ref 0.57–1)
DEPRECATED RDW RBC AUTO: 50.9 FL (ref 37–54)
EOSINOPHIL # BLD AUTO: 0.33 10*3/MM3 (ref 0–0.4)
EOSINOPHIL NFR BLD AUTO: 5.9 % (ref 0.3–6.2)
ERYTHROCYTE [DISTWIDTH] IN BLOOD BY AUTOMATED COUNT: 14.3 % (ref 12.3–15.4)
GFR SERPL CREATININE-BSD FRML MDRD: 88 ML/MIN/1.73
GLOBULIN UR ELPH-MCNC: 2.4 GM/DL
GLUCOSE SERPL-MCNC: 73 MG/DL (ref 65–99)
HCT VFR BLD AUTO: 41.2 % (ref 34–46.6)
HGB BLD-MCNC: 13.1 G/DL (ref 12–15.9)
IMM GRANULOCYTES # BLD AUTO: 0.01 10*3/MM3 (ref 0–0.05)
IMM GRANULOCYTES NFR BLD AUTO: 0.2 % (ref 0–0.5)
LYMPHOCYTES # BLD AUTO: 1.56 10*3/MM3 (ref 0.7–3.1)
LYMPHOCYTES NFR BLD AUTO: 27.9 % (ref 19.6–45.3)
MCH RBC QN AUTO: 30.8 PG (ref 26.6–33)
MCHC RBC AUTO-ENTMCNC: 31.8 G/DL (ref 31.5–35.7)
MCV RBC AUTO: 96.9 FL (ref 79–97)
MONOCYTES # BLD AUTO: 0.48 10*3/MM3 (ref 0.1–0.9)
MONOCYTES NFR BLD AUTO: 8.6 % (ref 5–12)
NEUTROPHILS NFR BLD AUTO: 3.12 10*3/MM3 (ref 1.7–7)
NEUTROPHILS NFR BLD AUTO: 55.6 % (ref 42.7–76)
NRBC BLD AUTO-RTO: 0 /100 WBC (ref 0–0.2)
PLATELET # BLD AUTO: 347 10*3/MM3 (ref 140–450)
PMV BLD AUTO: 10 FL (ref 6–12)
POTASSIUM SERPL-SCNC: 4 MMOL/L (ref 3.5–5.2)
PROT SERPL-MCNC: 6.8 G/DL (ref 6–8.5)
RBC # BLD AUTO: 4.25 10*6/MM3 (ref 3.77–5.28)
SODIUM SERPL-SCNC: 142 MMOL/L (ref 136–145)
WBC # BLD AUTO: 5.6 10*3/MM3 (ref 3.4–10.8)

## 2020-10-26 PROCEDURE — 80053 COMPREHEN METABOLIC PANEL: CPT

## 2020-10-26 PROCEDURE — 85025 COMPLETE CBC W/AUTO DIFF WBC: CPT

## 2020-10-26 PROCEDURE — 36415 COLL VENOUS BLD VENIPUNCTURE: CPT

## 2020-11-04 ENCOUNTER — OFFICE VISIT (OUTPATIENT)
Dept: ONCOLOGY | Facility: CLINIC | Age: 70
End: 2020-11-04

## 2020-11-04 VITALS
TEMPERATURE: 97.5 F | BODY MASS INDEX: 28.53 KG/M2 | SYSTOLIC BLOOD PRESSURE: 139 MMHG | WEIGHT: 161 LBS | OXYGEN SATURATION: 100 % | DIASTOLIC BLOOD PRESSURE: 66 MMHG | HEART RATE: 76 BPM | RESPIRATION RATE: 16 BRPM | HEIGHT: 63 IN

## 2020-11-04 DIAGNOSIS — C83.31 DIFFUSE LARGE B-CELL LYMPHOMA OF LYMPH NODES OF HEAD (HCC): Primary | ICD-10-CM

## 2020-11-04 PROCEDURE — 99214 OFFICE O/P EST MOD 30 MIN: CPT | Performed by: NURSE PRACTITIONER

## 2020-11-04 NOTE — PROGRESS NOTES
DATE OF VISIT: 11/4/2020    REASON FOR VISIT: Followup for mandible diffuse large B-cell lymphoma     HISTORY OF PRESENT ILLNESS: The patient is a very pleasant 70 y.o. 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  Staging workup revealed stage IIE disease.  Patient was started on definitive chemotherapy with R CHOP April 8, 2019.  The patient completed cycle #6 June 22, 2019.  The patient is here today for scheduled follow-up visit.    SUBJECTIVE: The patient is here today by herself. She has been doing fairly well. She has been healthy since her last visit. She has had no changes in health history. She denies weight loss, fevers, chills, night sweats, and lymphadenopathy. She has not been back to work yet due to concern regarding her age and cancer history with the current pandemic. She is anxious regarding the results of her CT scans.      PAST MEDICAL HISTORY/SOCIAL HISTORY/FAMILY HISTORY: Reviewed by me and unchanged from my documentation done on 11/04/20.    Review of Systems   Constitutional: Positive for fatigue. Negative for activity change, appetite change, chills, fever and unexpected weight change.   HENT: Negative for hearing loss, mouth sores, nosebleeds, sore throat and trouble swallowing.    Eyes: Negative for visual disturbance.   Respiratory: Negative for cough, chest tightness, shortness of breath and wheezing.    Cardiovascular: Negative for chest pain, palpitations and leg swelling.   Gastrointestinal: Negative for abdominal distention, abdominal pain, blood in stool, constipation, diarrhea, nausea, rectal pain and vomiting.   Endocrine: Negative for cold intolerance and heat intolerance.   Genitourinary: Positive for frequency. Negative for difficulty urinating, dysuria and urgency.        Overactive bladder   Musculoskeletal: Positive for arthralgias. Negative for back pain, gait problem, joint swelling and myalgias.   Skin:  "Negative for rash.   Neurological: Negative for dizziness, tremors, syncope, weakness, light-headedness, numbness and headaches.   Hematological: Negative for adenopathy. Does not bruise/bleed easily.   Psychiatric/Behavioral: Negative for confusion, sleep disturbance and suicidal ideas. The patient is not nervous/anxious.          Current Outpatient Medications:   •  CALCIUM PO, Take 600 mg by mouth Daily., Disp: , Rfl:   •  furosemide (LASIX) 20 MG tablet, Take 1 tablet by mouth As Needed (fluid retention). For fluid retention, Disp: 10 tablet, Rfl: 0  •  meloxicam (MOBIC) 7.5 MG tablet, Take 1 tablet by mouth Daily., Disp: 30 tablet, Rfl: 0  •  Multiple Vitamins-Minerals (MULTIVITAMIN ADULT PO), Take 1 tablet by mouth Daily., Disp: , Rfl:   •  omeprazole (priLOSEC) 20 MG capsule, TAKE ONE CAPSULE BY MOUTH EVERY NIGHT AT BEDTIME, Disp: 90 capsule, Rfl: 2  •  oxybutynin XL (DITROPAN XL) 15 MG 24 hr tablet, Take 1 tablet by mouth Daily., Disp: 30 tablet, Rfl: 5    PHYSICAL EXAMINATION:   /66   Pulse 76   Temp 97.5 °F (36.4 °C)   Resp 16   Ht 160 cm (63\")   Wt 73 kg (161 lb)   SpO2 100%   BMI 28.52 kg/m²    Pain Score    11/04/20 1056   PainSc: 0-No pain        ECOG Performance Status: 1 - Symptomatic but completely ambulatory  General Appearance:  alert, cooperative, no apparent distress and appears stated age   Neurologic/Psychiatric: A&O x 3, gait steady, appropriate affect, strength 5/5 in all muscle groups   HEENT:  Normocephalic, without obvious abnormality, mucous membranes moist   Neck: Supple, symmetrical, trachea midline, no adenopathy;  No thyromegaly, masses, or tenderness   Lungs:   Clear to auscultation bilaterally; respirations regular, even, and unlabored bilaterally   Heart:  Regular rate and rhythm, no murmurs appreciated   Abdomen:   Soft, non-tender, non-distended and no organomegaly   Lymph nodes: No cervical, supraclavicular, inguinal or axillary adenopathy noted   Extremities: " Normal, atraumatic; no clubbing, cyanosis, or edema    Skin: No rashes, ulcers, or suspicious lesions noted     Lab on 10/26/2020   Component Date Value Ref Range Status   • Glucose 10/26/2020 73  65 - 99 mg/dL Final   • BUN 10/26/2020 16  8 - 23 mg/dL Final   • Creatinine 10/26/2020 0.66  0.57 - 1.00 mg/dL Final   • Sodium 10/26/2020 142  136 - 145 mmol/L Final   • Potassium 10/26/2020 4.0  3.5 - 5.2 mmol/L Final   • Chloride 10/26/2020 106  98 - 107 mmol/L Final   • CO2 10/26/2020 26.0  22.0 - 29.0 mmol/L Final   • Calcium 10/26/2020 9.5  8.6 - 10.5 mg/dL Final   • Total Protein 10/26/2020 6.8  6.0 - 8.5 g/dL Final   • Albumin 10/26/2020 4.40  3.50 - 5.20 g/dL Final   • ALT (SGPT) 10/26/2020 20  1 - 33 U/L Final   • AST (SGOT) 10/26/2020 26  1 - 32 U/L Final   • Alkaline Phosphatase 10/26/2020 72  39 - 117 U/L Final   • Total Bilirubin 10/26/2020 0.5  0.0 - 1.2 mg/dL Final   • eGFR Non African Amer 10/26/2020 88  >60 mL/min/1.73 Final   • Globulin 10/26/2020 2.4  gm/dL Final   • A/G Ratio 10/26/2020 1.8  g/dL Final   • BUN/Creatinine Ratio 10/26/2020 24.2  7.0 - 25.0 Final   • Anion Gap 10/26/2020 10.0  5.0 - 15.0 mmol/L Final   • WBC 10/26/2020 5.60  3.40 - 10.80 10*3/mm3 Final   • RBC 10/26/2020 4.25  3.77 - 5.28 10*6/mm3 Final   • Hemoglobin 10/26/2020 13.1  12.0 - 15.9 g/dL Final   • Hematocrit 10/26/2020 41.2  34.0 - 46.6 % Final   • MCV 10/26/2020 96.9  79.0 - 97.0 fL Final   • MCH 10/26/2020 30.8  26.6 - 33.0 pg Final   • MCHC 10/26/2020 31.8  31.5 - 35.7 g/dL Final   • RDW 10/26/2020 14.3  12.3 - 15.4 % Final   • RDW-SD 10/26/2020 50.9  37.0 - 54.0 fl Final   • MPV 10/26/2020 10.0  6.0 - 12.0 fL Final   • Platelets 10/26/2020 347  140 - 450 10*3/mm3 Final   • Neutrophil % 10/26/2020 55.6  42.7 - 76.0 % Final   • Lymphocyte % 10/26/2020 27.9  19.6 - 45.3 % Final   • Monocyte % 10/26/2020 8.6  5.0 - 12.0 % Final   • Eosinophil % 10/26/2020 5.9  0.3 - 6.2 % Final   • Basophil % 10/26/2020 1.8* 0.0 - 1.5 %  Final   • Immature Grans % 10/26/2020 0.2  0.0 - 0.5 % Final   • Neutrophils, Absolute 10/26/2020 3.12  1.70 - 7.00 10*3/mm3 Final   • Lymphocytes, Absolute 10/26/2020 1.56  0.70 - 3.10 10*3/mm3 Final   • Monocytes, Absolute 10/26/2020 0.48  0.10 - 0.90 10*3/mm3 Final   • Eosinophils, Absolute 10/26/2020 0.33  0.00 - 0.40 10*3/mm3 Final   • Basophils, Absolute 10/26/2020 0.10  0.00 - 0.20 10*3/mm3 Final   • Immature Grans, Absolute 10/26/2020 0.01  0.00 - 0.05 10*3/mm3 Final   • nRBC 10/26/2020 0.0  0.0 - 0.2 /100 WBC Final        No results found.    ASSESSMENT: The patient is a very pleasant 70 y.o. female  with mandible diffuse large B-cell lymphoma.    PROBLEM LIST:  1. Mandible diffuse large B-cell lymphoma, stage IIE:  A.  Presented with gum pain and swelling  B.  CT facial bones done on February 26, 2019 revealed 2.3 cm lucency in the anterior mandible  C.  Status post biopsy done on March 7, 2019 consistent with diffuse large B-cell lymphoma  D.  Bone marrow biopsy done on March 25, 2019 no evidence of lymphoma involvement.  E.  Whole body PET scan done on March 28, 2019 revealed hypermetabolic activity in the right curtis-mandible as well as jugular cervical chains hypermetabolic lymphadenopathy level 2 and level 3.  F. Started chemotherapy with R-CHOP April 2019,status post 6 cycles, completed July 22, 2019.  2.  Osteoarthritis  3.  Chemotherapy-induced nausea  4.  Deep venous catheter  5.  Chemotherapy-induced constipation  6.  Thrombocytosis, reactive  7. Urinary frequency    PLAN:  1.  I reviewed the CAT scan results from 10/29/2020  with the patient. I was unable to review images as they were done at an outside facility. I did reassure the patient that she has no evidence of relapsed lymphoma without new or progressive disease.   2. I reviewed the lab results from 10/26/2020 with the patient. I reassured her that her blood counts are within normal limits, as well as kidney function, liver enzymes, and  electrolytes.   3. We will see the patient back in 6 months with repeat labs including CBC and CMP as well as repeat CAT scans of chest, abdomen, and pelvis ordered for prior to return.   4. We will continue Mobic 7.5 mg as needed for osteoarthritis.    5. She will continue oxybutynin 10 mg for over active bladder.   6. I asked the patient to call and return sooner with any concerning symptoms such as weight loss lymphadenopathy, fever, chills, or night sweats.   7. She will continue omeprazole 20 mg daily for acid reflux. She was noted to have hiatal hernia on CT scan.     Migdalia Blanca, APRN  11/4/2020

## 2020-11-18 ENCOUNTER — OFFICE VISIT (OUTPATIENT)
Dept: FAMILY MEDICINE CLINIC | Facility: CLINIC | Age: 70
End: 2020-11-18

## 2020-11-18 VITALS
HEART RATE: 83 BPM | BODY MASS INDEX: 28.17 KG/M2 | SYSTOLIC BLOOD PRESSURE: 126 MMHG | DIASTOLIC BLOOD PRESSURE: 78 MMHG | OXYGEN SATURATION: 98 % | WEIGHT: 159 LBS | HEIGHT: 63 IN

## 2020-11-18 DIAGNOSIS — K21.9 GASTROESOPHAGEAL REFLUX DISEASE, UNSPECIFIED WHETHER ESOPHAGITIS PRESENT: ICD-10-CM

## 2020-11-18 DIAGNOSIS — N32.81 OVERACTIVE BLADDER: ICD-10-CM

## 2020-11-18 DIAGNOSIS — Z00.00 WELL ADULT EXAM: Primary | ICD-10-CM

## 2020-11-18 DIAGNOSIS — C83.31 DIFFUSE LARGE B-CELL LYMPHOMA OF LYMPH NODES OF HEAD (HCC): ICD-10-CM

## 2020-11-18 PROCEDURE — 96160 PT-FOCUSED HLTH RISK ASSMT: CPT | Performed by: FAMILY MEDICINE

## 2020-11-18 PROCEDURE — G0439 PPPS, SUBSEQ VISIT: HCPCS | Performed by: FAMILY MEDICINE

## 2020-11-18 PROCEDURE — 99397 PER PM REEVAL EST PAT 65+ YR: CPT | Performed by: FAMILY MEDICINE

## 2020-11-18 RX ORDER — OXYBUTYNIN CHLORIDE 15 MG/1
15 TABLET, EXTENDED RELEASE ORAL DAILY
Qty: 30 TABLET | Refills: 11 | Status: SHIPPED | OUTPATIENT
Start: 2020-11-18 | End: 2021-11-02

## 2020-11-18 NOTE — PATIENT INSTRUCTIONS
Medicare Wellness  Personal Prevention Plan of Service     Date of Office Visit:  2020  Encounter Provider:  Janette Byrne MD  Place of Service:  [facility]  Patient Name: Lanette Simpson  :  1950    As part of the Medicare Wellness portion of your visit today, we are providing you with this personalized preventive plan of services (PPPS). This plan is based upon recommendations of the [place] Preventive Services Task Force (USPSTF) and the Advisory Committee on Immunization Practices (ACIP).    This lists the preventive care services that should be considered, and provides dates of when you are due. Items listed as completed are up-to-date and do not require any further intervention.    Health Maintenance   Topic Date Due   • COLONOSCOPY  1950   • TDAP/TD VACCINES (1 - Tdap) 1968   • ZOSTER VACCINE (1 of 2) 1999   • Pneumococcal Vaccine 65+ (1 of 1 - PPSV23) 2014   • HEPATITIS C SCREENING  10/19/2017   • MAMMOGRAM  2020   • DXA SCAN  2020   • ANNUAL WELLNESS VISIT  2021   • INFLUENZA VACCINE  Completed       No orders of the defined types were placed in this encounter.      Return in about 1 year (around 2021) for Medicare Wellness.

## 2020-11-18 NOTE — PROGRESS NOTES
The ABCs of the Annual Wellness Visit  Subsequent Medicare Wellness Visit    Chief Complaint   Patient presents with   • Medicare Wellness-subsequent       Subjective   History of Present Illness:  Lanette Simpson is a 70 y.o. female who presents for a Subsequent Medicare Wellness Visit.    She sometimes calls something a word completely different after her chemo. She had normal mammogram early this year. She has had a prior colonoscopy around 7 years ago, she had diverticulosis and colon polyps. Taste buds stay crazy since taking oxybutynin.         HEALTH RISK ASSESSMENT    Recent Hospitalizations:  No hospitalization(s) within the last year.    Current Medical Providers:  Patient Care Team:  Janette Rodriguez MD as PCP - General (Family Medicine)  Joan Zuluaga MD as Consulting Physician (Hematology and Oncology)    Smoking Status:  Social History     Tobacco Use   Smoking Status Never Smoker   Smokeless Tobacco Never Used       Alcohol Consumption:  Social History     Substance and Sexual Activity   Alcohol Use No       Depression Screen:   PHQ-2/PHQ-9 Depression Screening 11/18/2020   Little interest or pleasure in doing things 0   Feeling down, depressed, or hopeless 0   Total Score 0       Fall Risk Screen:  STEADI Fall Risk Assessment was completed, and patient is at LOW risk for falls.Assessment completed on:11/18/2020   STEADI Fall Risk Clinician Key Questions   Have you fallen in the past year?: No  Do you feel unsteady with walking?: Yes  Are you worried about falling?: No    Stay Idependant Patient Questions   Patient Fall Risk Assessment Score : 0        Health Habits and Functional and Cognitive Screening:  Functional & Cognitive Status 11/18/2020   Do you have difficulty preparing food and eating? No   Do you have difficulty bathing yourself, getting dressed or grooming yourself? No   Do you have difficulty using the toilet? No   Do you have difficulty moving around from place to  place? No   Do you have trouble with steps or getting out of a bed or a chair? No   Current Diet Well Balanced Diet   Dental Exam Up to date   Eye Exam Not up to date        Eye Exam Comment scheduled for Friday   Exercise (times per week) 7 times per week   Current Exercise Activities Include other   Do you need help using the phone?  No   Are you deaf or do you have serious difficulty hearing?  Yes   Do you need help with transportation? No   Do you need help shopping? No   Do you need help preparing meals?  No   Do you need help with housework?  No   Do you need help with laundry? No   Do you need help taking your medications? No   Do you need help managing money? No   Do you ever drive or ride in a car without wearing a seat belt? No   Have you felt unusual stress, anger or loneliness in the last month? No   Who do you live with? Spouse   If you need help, do you have trouble finding someone available to you? No   Have you been bothered in the last four weeks by sexual problems? No   Do you have difficulty concentrating, remembering or making decisions? Yes         Does the patient have evidence of cognitive impairment? No    Asprin use counseling:Does not need ASA (and currently is not on it)    Age-appropriate Screening Schedule:  Refer to the list below for future screening recommendations based on patient's age, sex and/or medical conditions. Orders for these recommended tests are listed in the plan section. The patient has been provided with a written plan.    Health Maintenance   Topic Date Due   • COLONOSCOPY  1950   • TDAP/TD VACCINES (1 - Tdap) 05/08/1968   • ZOSTER VACCINE (1 of 2) 05/08/1999   • MAMMOGRAM  03/12/2020   • DXA SCAN  11/18/2020   • INFLUENZA VACCINE  Completed          Immunization History   Administered Date(s) Administered   • Fluzone High Dose =>65 Years ([employer] ONLY) 09/25/2020       The following portions of the patient's history were reviewed and updated as appropriate: She   has a past medical history of Arthritis, Colon, diverticulosis, Degenerative arthritis of spine, Diffuse large B cell lymphoma (CMS/HCC) (2019), Nonalcoholic hepatosteatosis, OAB (overactive bladder), Osteopenia, and Wears glasses.  She  has a past surgical history that includes Total abdominal hysterectomy; Bone marrow biopsy; Bone biopsy; Colonoscopy (2013); and Venous Access Device (Port) (N/A, 4/4/2019).  Her family history includes Breast cancer in her maternal grandmother and paternal grandmother; Diabetes in her brother, brother, and father; Glaucoma in her paternal grandfather; Hypertension in her brother, brother, mother, and sister; Pancreatic cancer in her mother; Prostate cancer in her father; Stroke in her maternal grandfather.  She  reports that she has never smoked. She has never used smokeless tobacco. She reports that she does not drink alcohol or use drugs.  She has No Known Allergies..    Outpatient Medications Prior to Visit   Medication Sig Dispense Refill   • CALCIUM PO Take 600 mg by mouth Daily.     • furosemide (LASIX) 20 MG tablet Take 1 tablet by mouth As Needed (fluid retention). For fluid retention 10 tablet 0   • Multiple Vitamins-Minerals (MULTIVITAMIN ADULT PO) Take 1 tablet by mouth Daily.     • omeprazole (priLOSEC) 20 MG capsule TAKE ONE CAPSULE BY MOUTH EVERY NIGHT AT BEDTIME 90 capsule 2   • oxybutynin XL (DITROPAN XL) 15 MG 24 hr tablet Take 1 tablet by mouth Daily. 30 tablet 5   • meloxicam (MOBIC) 7.5 MG tablet Take 1 tablet by mouth Daily. 30 tablet 0     No facility-administered medications prior to visit.        Patient Active Problem List   Diagnosis   • Ocular histoplasmosis   • GERD (gastroesophageal reflux disease)   • Varicose vein of leg   • Urinary frequency   • Diffuse large B-cell lymphoma of lymph nodes of head (CMS/HCC)   • Overactive bladder       Advanced Care Planning:  ACP discussion was held with the patient during this visit. Patient does not have an  "advance directive, information provided.    Review of Systems   HENT: Negative for hearing loss.    Gastrointestinal: Negative for constipation.        No heartburn   Genitourinary: Positive for frequency.   Neurological:        Memory problem   Psychiatric/Behavioral: Negative for dysphoric mood.       Compared to one year ago, the patient feels her physical health is better.  Compared to one year ago, the patient feels her mental health is better.    Reviewed chart for potential of high risk medication in the elderly: not applicable  Reviewed chart for potential of harmful drug interactions in the elderly:not applicable    Objective         Vitals:    11/18/20 1354   BP: 126/78   BP Location: Left arm   Patient Position: Sitting   Cuff Size: Adult   Pulse: 83   SpO2: 98%   Weight: 72.1 kg (159 lb)   Height: 160 cm (63\")   PainSc: 0-No pain       Body mass index is 28.17 kg/m².  Discussed the patient's BMI with her. The BMI is above average; no BMI management plan is appropriate..    Physical Exam  Constitutional:       General: She is not in acute distress.  HENT:      Right Ear: Tympanic membrane and ear canal normal.      Left Ear: Tympanic membrane and ear canal normal.   Eyes:      General:         Right eye: No discharge.         Left eye: No discharge.      Conjunctiva/sclera: Conjunctivae normal.   Neck:      Musculoskeletal: Neck supple.      Thyroid: No thyromegaly.   Cardiovascular:      Rate and Rhythm: Normal rate and regular rhythm.      Heart sounds: Normal heart sounds. No murmur.   Pulmonary:      Effort: Pulmonary effort is normal.      Breath sounds: Normal breath sounds.   Abdominal:      General: Bowel sounds are normal.      Palpations: Abdomen is soft.      Tenderness: There is no abdominal tenderness.   Genitourinary:     Comments: No suprapubic tenderness  Musculoskeletal:      Right lower leg: No edema.      Left lower leg: No edema.   Lymphadenopathy:      Head:      Right side of head: " No submandibular, preauricular or posterior auricular adenopathy.      Left side of head: No submandibular, preauricular or posterior auricular adenopathy.      Cervical: No cervical adenopathy.   Skin:     General: Skin is warm and dry.   Neurological:      Mental Status: She is alert and oriented to person, place, and time.   Psychiatric:         Mood and Affect: Mood normal.         Behavior: Behavior normal.         Thought Content: Thought content normal.         Judgment: Judgment normal.               Assessment/Plan   Medicare Risks and Personalized Health Plan  CMS Preventative Services Quick Reference  Advance Directive Discussion  Breast Cancer/Mammogram Screening  Colon Cancer Screening  Immunizations Discussed/Encouraged (specific immunizations; Influenza and Pneumococcal 23 )  Osteoprorosis Risk   Counseled on health maintenance topics and preventative care recommendations: supplements      The above risks/problems have been discussed with the patient.  Pertinent information has been shared with the patient in the After Visit Summary.  Follow up plans and orders are seen below in the Assessment/Plan Section.    Diagnoses and all orders for this visit:    1. Well adult exam (Primary)  Obtain mammogram report from [facility].   Obtain colonoscopy report from [facility].   Obtain immunization records from [facility].   2. Overactive bladder  -     oxybutynin XL (DITROPAN XL) 15 MG 24 hr tablet; Take 1 tablet by mouth Daily.  Dispense: 30 tablet; Refill: 11  Continue oxybutynin. Dry mouth could be related to chronic change in taste bud.   3. Diffuse large B-cell lymphoma of lymph nodes of head (CMS/HCC)  Reviewed heme/onc note 11/4/20. Stable labs and scan.   4. Gastroesophageal reflux disease, unspecified whether esophagitis present  Stable. Continue PPI.     Follow Up:  Return in about 1 year (around 11/18/2021) for Medicare Wellness.     An After Visit Summary and PPPS were given to the  patient.

## 2021-03-05 ENCOUNTER — TELEPHONE (OUTPATIENT)
Dept: FAMILY MEDICINE CLINIC | Facility: CLINIC | Age: 71
End: 2021-03-05

## 2021-03-05 RX ORDER — MELOXICAM 7.5 MG/1
7.5 TABLET ORAL DAILY PRN
Qty: 30 TABLET | Refills: 4 | Status: SHIPPED | OUTPATIENT
Start: 2021-03-05 | End: 2021-11-02

## 2021-03-05 NOTE — TELEPHONE ENCOUNTER
Patient states that Dr. Byrne prescribed Meloxicam for her knee and she needs a refill sent to [facility].  She can be reached at [phone]

## 2021-03-16 ENCOUNTER — OFFICE VISIT (OUTPATIENT)
Dept: FAMILY MEDICINE CLINIC | Facility: CLINIC | Age: 71
End: 2021-03-16

## 2021-03-16 VITALS
OXYGEN SATURATION: 98 % | BODY MASS INDEX: 29.59 KG/M2 | WEIGHT: 167 LBS | HEART RATE: 98 BPM | SYSTOLIC BLOOD PRESSURE: 122 MMHG | DIASTOLIC BLOOD PRESSURE: 80 MMHG | HEIGHT: 63 IN

## 2021-03-16 DIAGNOSIS — R35.0 URINARY FREQUENCY: ICD-10-CM

## 2021-03-16 DIAGNOSIS — M17.12 PRIMARY OSTEOARTHRITIS OF LEFT KNEE: ICD-10-CM

## 2021-03-16 DIAGNOSIS — N39.0 ACUTE UTI: Primary | ICD-10-CM

## 2021-03-16 PROBLEM — N32.81 OVERACTIVE BLADDER: Chronic | Status: ACTIVE | Noted: 2020-11-18

## 2021-03-16 LAB
BILIRUB BLD-MCNC: NEGATIVE MG/DL
CLARITY, POC: ABNORMAL
COLOR UR: YELLOW
GLUCOSE UR STRIP-MCNC: NEGATIVE MG/DL
KETONES UR QL: NEGATIVE
LEUKOCYTE EST, POC: ABNORMAL
NITRITE UR-MCNC: NEGATIVE MG/ML
PH UR: 8 [PH] (ref 5–8)
PROT UR STRIP-MCNC: NEGATIVE MG/DL
RBC # UR STRIP: ABNORMAL /UL
SP GR UR: 1.01 (ref 1–1.03)
UROBILINOGEN UR QL: NORMAL

## 2021-03-16 PROCEDURE — 99214 OFFICE O/P EST MOD 30 MIN: CPT | Performed by: FAMILY MEDICINE

## 2021-03-16 PROCEDURE — 81003 URINALYSIS AUTO W/O SCOPE: CPT | Performed by: FAMILY MEDICINE

## 2021-03-16 RX ORDER — METHYLPREDNISOLONE 4 MG/1
TABLET ORAL
Qty: 1 EACH | Refills: 0 | Status: SHIPPED | OUTPATIENT
Start: 2021-03-16 | End: 2021-03-31

## 2021-03-16 RX ORDER — SULFAMETHOXAZOLE AND TRIMETHOPRIM 800; 160 MG/1; MG/1
1 TABLET ORAL 2 TIMES DAILY
Qty: 6 TABLET | Refills: 0 | Status: SHIPPED | OUTPATIENT
Start: 2021-03-16 | End: 2021-03-19

## 2021-03-16 NOTE — PROGRESS NOTES
"Chief Complaint  Joint Swelling (left knee, has had issues with it in the past usually only lasts for a couple of days) and Urinary Frequency (pain)    Subjective          Lanette Simpson presents to [facility]  History of Present Illness    Painful urination, urgency and at end of urination has pain intermittently. No hematuria. Symptoms started 3-4 days ago. Low grade temp 100 and chills.     Left knee pain for a few years and some swelling. She used meloxicam and brace with improvement in the past. Now swelling has increased to lower leg. Improves with ice. Morning getting up her ankle swelling has gone down. Swelling more as the day goes on. She has also tried biofreeze OTC. Tylenol and ice as well. She has resumed meloxicam daily. She gets pain on left side with rain.          Objective   Vital Signs:   /80 (BP Location: Left arm, Patient Position: Sitting, Cuff Size: Adult)   Pulse 98   Ht 160 cm (63\")   Wt 75.8 kg (167 lb)   SpO2 98%   BMI 29.58 kg/m²     Physical Exam  Vitals reviewed.   Constitutional:       General: She is not in acute distress.     Appearance: She is not ill-appearing.   Cardiovascular:      Rate and Rhythm: Normal rate and regular rhythm.   Pulmonary:      Effort: Pulmonary effort is normal.      Breath sounds: Normal breath sounds.   Genitourinary:     Comments: No pelvic or suprapubic tenderness  Musculoskeletal:      Left knee: Swelling, effusion and crepitus present. No erythema, ecchymosis or bony tenderness. Normal range of motion. No LCL laxity, MCL laxity, ACL laxity or PCL laxity.Normal meniscus and normal patellar mobility.   Neurological:      Mental Status: She is alert.   Psychiatric:         Mood and Affect: Mood normal.         Behavior: Behavior normal.         Thought Content: Thought content normal.         Judgment: Judgment normal.        Result Review :   The following data was reviewed by: Janette Byrne MD " on 03/16/2021:           Results for orders placed or performed in visit on 03/16/21   POCT urinalysis dipstick, automated    Specimen: Urine   Result Value Ref Range    Color Yellow Yellow, Straw, Dark Yellow, Kelly    Clarity, UA Cloudy (A) Clear    Specific Gravity  1.010 (A) 1.005 - 1.030    pH, Urine 8.0 5.0 - 8.0    Leukocytes Large (3+) (A) Negative    Nitrite, UA Negative Negative    Protein, POC Negative Negative mg/dL    Glucose, UA Negative Negative, 1000 mg/dL (3+) mg/dL    Ketones, UA Negative Negative    Urobilinogen, UA Normal Normal    Bilirubin Negative Negative    Blood, UA 1+ (A) Negative          Assessment and Plan {CC Problem List  Visit Diagnosis  ROS  Review (Popup)  Health Maintenance  Quality  BestPractice  Medications  SmartSets  SnapShot Encounters  Media :23}   Diagnoses and all orders for this visit:    1. Acute UTI (Primary)  -     Urine Culture - Urine, Urine, Clean Catch; Future  -     sulfamethoxazole-trimethoprim (BACTRIM DS,SEPTRA DS) 800-160 MG per tablet; Take 1 tablet by mouth 2 (Two) Times a Day for 3 days.  Dispense: 6 tablet; Refill: 0  New.  Treat empirically with Bactrim.  Send urine for culture.  2. Urinary frequency  -     POCT urinalysis dipstick, automated    3. Primary osteoarthritis of left knee  -     methylPREDNISolone (MEDROL) 4 MG dose pack; Take as directed on package instructions.  Dispense: 1 each; Refill: 0  New.  Discussed treatment with patient including oral steroids versus steroid knee injection.  Patient chose pills.  Will start Medrol Dosepak with taper.  Okay to use Tylenol meloxicam for breakthrough pain.  Follow-up if not improving for further treatment.      Follow Up   Return if symptoms worsen or fail to improve.     Electronically signed by Janette Byrne MD, 03/16/21, 10:47 AM EDT.    Patient was given instructions and counseling regarding her condition or for health maintenance advice. Please see specific information  pulled into the AVS if appropriate.

## 2021-03-23 RX ORDER — OMEPRAZOLE 20 MG/1
CAPSULE, DELAYED RELEASE ORAL
Qty: 90 CAPSULE | Refills: 1 | Status: SHIPPED | OUTPATIENT
Start: 2021-03-23 | End: 2021-10-07

## 2021-03-26 ENCOUNTER — TELEPHONE (OUTPATIENT)
Dept: FAMILY MEDICINE CLINIC | Facility: CLINIC | Age: 71
End: 2021-03-26

## 2021-03-31 ENCOUNTER — OFFICE VISIT (OUTPATIENT)
Dept: FAMILY MEDICINE CLINIC | Facility: CLINIC | Age: 71
End: 2021-03-31

## 2021-03-31 VITALS
WEIGHT: 166 LBS | SYSTOLIC BLOOD PRESSURE: 122 MMHG | HEIGHT: 63 IN | HEART RATE: 89 BPM | TEMPERATURE: 97.5 F | BODY MASS INDEX: 29.41 KG/M2 | OXYGEN SATURATION: 99 % | DIASTOLIC BLOOD PRESSURE: 78 MMHG

## 2021-03-31 DIAGNOSIS — M17.12 PRIMARY OSTEOARTHRITIS OF LEFT KNEE: Primary | ICD-10-CM

## 2021-03-31 PROCEDURE — 99213 OFFICE O/P EST LOW 20 MIN: CPT | Performed by: FAMILY MEDICINE

## 2021-03-31 PROCEDURE — 20610 DRAIN/INJ JOINT/BURSA W/O US: CPT | Performed by: FAMILY MEDICINE

## 2021-03-31 RX ORDER — TRIAMCINOLONE ACETONIDE 40 MG/ML
80 INJECTION, SUSPENSION INTRA-ARTICULAR; INTRAMUSCULAR ONCE
Status: COMPLETED | OUTPATIENT
Start: 2021-03-31 | End: 2021-03-31

## 2021-03-31 RX ORDER — LIDOCAINE HYDROCHLORIDE 10 MG/ML
2 INJECTION, SOLUTION INFILTRATION; PERINEURAL ONCE
Status: COMPLETED | OUTPATIENT
Start: 2021-03-31 | End: 2021-03-31

## 2021-03-31 RX ADMIN — LIDOCAINE HYDROCHLORIDE 2 ML: 10 INJECTION, SOLUTION INFILTRATION; PERINEURAL at 13:01

## 2021-03-31 RX ADMIN — TRIAMCINOLONE ACETONIDE 80 MG: 40 INJECTION, SUSPENSION INTRA-ARTICULAR; INTRAMUSCULAR at 13:01

## 2021-03-31 NOTE — PROGRESS NOTES
"Chief Complaint  Knee Pain (Pt states she has been having some left knee pain. )    Subjective          Lanette Simpson presents to [facility]  History of Present Illness     Patient has had chronic left knee pain for years. She took Medrol and it helped. Swelling was down and pain improved to minimal. As soon as she stopped, started pain and swelling again. She wears knee brace to walk, except not sometimes in the house. She has been icing her knee. Uses walker for support while in the house. Worse getting out of bed at night, sitting to standing. Excruciating pain walking at night.     Objective   Vital Signs:   /78   Pulse 89   Temp 97.5 °F (36.4 °C)   Ht 160 cm (63\")   Wt 75.3 kg (166 lb)   SpO2 99%   BMI 29.41 kg/m²     Physical Exam  Musculoskeletal:      Left knee: No effusion, erythema or ecchymosis.      Left lower leg: No edema.   Neurological:      Mental Status: She is alert.   Psychiatric:         Mood and Affect: Mood is anxious.        Result Review :          Arthrocentesis    Date/Time: 3/31/2021 1:01 PM  Performed by: Janette Rodriguez MD  Authorized by: Janette Rodriguez MD   Indications: pain and joint swelling   Body area: knee  Joint: left knee  Local anesthesia used: yes    Anesthesia:  Local anesthesia used: yes  Local anesthetic: ethyl chloride spray.    Sedation:  Patient sedated: no    Preparation: Patient was prepped and draped in the usual sterile fashion.  Needle gauge: 21G.  Ultrasound guidance: no  Approach: lateral  Patient tolerance: patient tolerated the procedure well with no immediate complications         Administrations This Visit     lidocaine (XYLOCAINE) 1 % injection 2 mL     Admin Date  03/31/2021 Action  Given Dose  2 mL Route  Intra-articular Administered By  Janette Rodriguez MD          triamcinolone acetonide (KENALOG-40) injection 80 mg     Admin Date  03/31/2021 Action  Given Dose  80 mg " Route  Intra-articular Administered By  Janette Rodriguez MD                   Assessment and Plan    Diagnoses and all orders for this visit:    1. Primary osteoarthritis of left knee (Primary)  -     lidocaine (XYLOCAINE) 1 % injection 2 mL  -     triamcinolone acetonide (KENALOG-40) injection 80 mg  -     Arthrocentesis    Timeout performed.  Risk and benefits discussed.  Consent signed.  Triamcinolone and lidocaine injected without complications.  Continue meloxicam as needed for pain.  Continue ice.  She may use brace if it provides comfort.    Follow Up   Return if symptoms worsen or fail to improve.  Patient was given instructions and counseling regarding her condition or for health maintenance advice. Please see specific information pulled into the AVS if appropriate.     Electronically signed by Janette Byrne MD, 03/31/21, 1:14 PM EDT.

## 2021-04-23 ENCOUNTER — LAB (OUTPATIENT)
Dept: LAB | Facility: HOSPITAL | Age: 71
End: 2021-04-23

## 2021-04-23 DIAGNOSIS — C83.31 DIFFUSE LARGE B-CELL LYMPHOMA OF LYMPH NODES OF HEAD (HCC): ICD-10-CM

## 2021-04-23 LAB
ALBUMIN SERPL-MCNC: 4.2 G/DL (ref 3.5–5.2)
ALBUMIN/GLOB SERPL: 1.5 G/DL
ALP SERPL-CCNC: 78 U/L (ref 39–117)
ALT SERPL W P-5'-P-CCNC: 17 U/L (ref 1–33)
ANION GAP SERPL CALCULATED.3IONS-SCNC: 8 MMOL/L (ref 5–15)
AST SERPL-CCNC: 30 U/L (ref 1–32)
BASOPHILS # BLD AUTO: 0.09 10*3/MM3 (ref 0–0.2)
BASOPHILS NFR BLD AUTO: 1 % (ref 0–1.5)
BILIRUB SERPL-MCNC: 0.6 MG/DL (ref 0–1.2)
BUN SERPL-MCNC: 19 MG/DL (ref 8–23)
BUN/CREAT SERPL: 23.8 (ref 7–25)
CALCIUM SPEC-SCNC: 8.8 MG/DL (ref 8.6–10.5)
CHLORIDE SERPL-SCNC: 102 MMOL/L (ref 98–107)
CO2 SERPL-SCNC: 28 MMOL/L (ref 22–29)
CREAT SERPL-MCNC: 0.8 MG/DL (ref 0.57–1)
DEPRECATED RDW RBC AUTO: 51.7 FL (ref 37–54)
EOSINOPHIL # BLD AUTO: 0.23 10*3/MM3 (ref 0–0.4)
EOSINOPHIL NFR BLD AUTO: 2.6 % (ref 0.3–6.2)
ERYTHROCYTE [DISTWIDTH] IN BLOOD BY AUTOMATED COUNT: 14.7 % (ref 12.3–15.4)
GFR SERPL CREATININE-BSD FRML MDRD: 71 ML/MIN/1.73
GLOBULIN UR ELPH-MCNC: 2.8 GM/DL
GLUCOSE SERPL-MCNC: 92 MG/DL (ref 65–99)
HCT VFR BLD AUTO: 42.9 % (ref 34–46.6)
HGB BLD-MCNC: 14.2 G/DL (ref 12–15.9)
IMM GRANULOCYTES # BLD AUTO: 0.02 10*3/MM3 (ref 0–0.05)
IMM GRANULOCYTES NFR BLD AUTO: 0.2 % (ref 0–0.5)
LYMPHOCYTES # BLD AUTO: 1.61 10*3/MM3 (ref 0.7–3.1)
LYMPHOCYTES NFR BLD AUTO: 18.1 % (ref 19.6–45.3)
MCH RBC QN AUTO: 31.8 PG (ref 26.6–33)
MCHC RBC AUTO-ENTMCNC: 33.1 G/DL (ref 31.5–35.7)
MCV RBC AUTO: 96.2 FL (ref 79–97)
MONOCYTES # BLD AUTO: 0.72 10*3/MM3 (ref 0.1–0.9)
MONOCYTES NFR BLD AUTO: 8.1 % (ref 5–12)
NEUTROPHILS NFR BLD AUTO: 6.21 10*3/MM3 (ref 1.7–7)
NEUTROPHILS NFR BLD AUTO: 70 % (ref 42.7–76)
NRBC BLD AUTO-RTO: 0 /100 WBC (ref 0–0.2)
PLATELET # BLD AUTO: 393 10*3/MM3 (ref 140–450)
PMV BLD AUTO: 9.6 FL (ref 6–12)
POTASSIUM SERPL-SCNC: 3.9 MMOL/L (ref 3.5–5.2)
PROT SERPL-MCNC: 7 G/DL (ref 6–8.5)
RBC # BLD AUTO: 4.46 10*6/MM3 (ref 3.77–5.28)
SODIUM SERPL-SCNC: 138 MMOL/L (ref 136–145)
WBC # BLD AUTO: 8.88 10*3/MM3 (ref 3.4–10.8)

## 2021-04-23 PROCEDURE — 85025 COMPLETE CBC W/AUTO DIFF WBC: CPT

## 2021-04-23 PROCEDURE — 36415 COLL VENOUS BLD VENIPUNCTURE: CPT

## 2021-04-23 PROCEDURE — 80053 COMPREHEN METABOLIC PANEL: CPT

## 2021-05-04 ENCOUNTER — OFFICE VISIT (OUTPATIENT)
Dept: ONCOLOGY | Facility: CLINIC | Age: 71
End: 2021-05-04

## 2021-05-04 VITALS
TEMPERATURE: 98.2 F | SYSTOLIC BLOOD PRESSURE: 139 MMHG | HEIGHT: 63 IN | RESPIRATION RATE: 16 BRPM | DIASTOLIC BLOOD PRESSURE: 75 MMHG | OXYGEN SATURATION: 98 % | WEIGHT: 162 LBS | BODY MASS INDEX: 28.7 KG/M2 | HEART RATE: 82 BPM

## 2021-05-04 DIAGNOSIS — C83.31 DIFFUSE LARGE B-CELL LYMPHOMA OF LYMPH NODES OF HEAD (HCC): Primary | ICD-10-CM

## 2021-05-04 PROCEDURE — 99214 OFFICE O/P EST MOD 30 MIN: CPT | Performed by: INTERNAL MEDICINE

## 2021-05-04 RX ORDER — HYDROCODONE BITARTRATE AND ACETAMINOPHEN 5; 325 MG/1; MG/1
TABLET ORAL
COMMUNITY
Start: 2021-03-30 | End: 2021-11-02

## 2021-05-04 NOTE — PROGRESS NOTES
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  Staging workup revealed stage IIE disease.  Patient was started on definitive chemotherapy with R CHOP April 8, 2019.  The patient completed cycle #6 June 22, 2019.  The patient is here today for scheduled follow-up visit.    SUBJECTIVE: The patient is here today by herself.  All in all she is doing fairly well.  She denied any fever chills night sweats.  Since the last visit she had to have a tooth extraction.  Pathology was benign.    PAST MEDICAL HISTORY/SOCIAL HISTORY/FAMILY HISTORY: Reviewed by me and unchanged from my documentation done on 05/04/21.    Review of Systems   Constitutional: Positive for fatigue. Negative for activity change, appetite change, chills, fever and unexpected weight change.   HENT: Negative for hearing loss, mouth sores, nosebleeds, sore throat and trouble swallowing.    Eyes: Negative for visual disturbance.   Respiratory: Negative for cough, chest tightness, shortness of breath and wheezing.    Cardiovascular: Negative for chest pain, palpitations and leg swelling.   Gastrointestinal: Negative for abdominal distention, abdominal pain, blood in stool, constipation, diarrhea, nausea, rectal pain and vomiting.   Endocrine: Negative for cold intolerance and heat intolerance.   Genitourinary: Positive for frequency. Negative for difficulty urinating, dysuria and urgency.        Overactive bladder   Musculoskeletal: Positive for arthralgias. Negative for back pain, gait problem, joint swelling and myalgias.   Skin: Negative for rash.   Neurological: Negative for dizziness, tremors, syncope, weakness, light-headedness, numbness and headaches.   Hematological: Negative for adenopathy. Does not bruise/bleed easily.  "  Psychiatric/Behavioral: Negative for confusion, sleep disturbance and suicidal ideas. The patient is not nervous/anxious.          Current Outpatient Medications:   •  CALCIUM PO, Take 600 mg by mouth Daily., Disp: , Rfl:   •  furosemide (LASIX) 20 MG tablet, Take 1 tablet by mouth As Needed (fluid retention). For fluid retention, Disp: 10 tablet, Rfl: 0  •  HYDROcodone-acetaminophen (NORCO) 5-325 MG per tablet, , Disp: , Rfl:   •  meloxicam (MOBIC) 7.5 MG tablet, Take 1 tablet by mouth Daily As Needed for Moderate Pain ., Disp: 30 tablet, Rfl: 4  •  Multiple Vitamins-Minerals (MULTIVITAMIN ADULT PO), Take 1 tablet by mouth Daily., Disp: , Rfl:   •  omeprazole (priLOSEC) 20 MG capsule, TAKE ONE CAPSULE BY MOUTH EVERY NIGHT AT BEDTIME, Disp: 90 capsule, Rfl: 1  •  oxybutynin XL (DITROPAN XL) 15 MG 24 hr tablet, Take 1 tablet by mouth Daily., Disp: 30 tablet, Rfl: 11    PHYSICAL EXAMINATION:   /75   Pulse 82   Temp 98.2 °F (36.8 °C) (Temporal)   Resp 16   Ht 160 cm (62.99\")   Wt 73.5 kg (162 lb)   SpO2 98%   BMI 28.70 kg/m²    Pain Score    05/04/21 1134   PainSc: 0-No pain        ECOG Performance Status: 1 - Symptomatic but completely ambulatory  General Appearance:  alert, cooperative, no apparent distress and appears stated age   Neurologic/Psychiatric: A&O x 3, gait steady, appropriate affect, strength 5/5 in all muscle groups   HEENT:  Normocephalic, without obvious abnormality, mucous membranes moist   Neck: Supple, symmetrical, trachea midline, no adenopathy;  No thyromegaly, masses, or tenderness   Lungs:   Clear to auscultation bilaterally; respirations regular, even, and unlabored bilaterally   Heart:  Regular rate and rhythm, no murmurs appreciated   Abdomen:   Soft, non-tender, non-distended and no organomegaly   Lymph nodes: No cervical, supraclavicular, inguinal or axillary adenopathy noted   Extremities: Normal, atraumatic; no clubbing, cyanosis, or edema    Skin: No rashes, ulcers, or " suspicious lesions noted     Lab on 04/23/2021   Component Date Value Ref Range Status   • Glucose 04/23/2021 92  65 - 99 mg/dL Final   • BUN 04/23/2021 19  8 - 23 mg/dL Final   • Creatinine 04/23/2021 0.80  0.57 - 1.00 mg/dL Final   • Sodium 04/23/2021 138  136 - 145 mmol/L Final   • Potassium 04/23/2021 3.9  3.5 - 5.2 mmol/L Final   • Chloride 04/23/2021 102  98 - 107 mmol/L Final   • CO2 04/23/2021 28.0  22.0 - 29.0 mmol/L Final   • Calcium 04/23/2021 8.8  8.6 - 10.5 mg/dL Final   • Total Protein 04/23/2021 7.0  6.0 - 8.5 g/dL Final   • Albumin 04/23/2021 4.20  3.50 - 5.20 g/dL Final   • ALT (SGPT) 04/23/2021 17  1 - 33 U/L Final   • AST (SGOT) 04/23/2021 30  1 - 32 U/L Final   • Alkaline Phosphatase 04/23/2021 78  39 - 117 U/L Final   • Total Bilirubin 04/23/2021 0.6  0.0 - 1.2 mg/dL Final   • eGFR Non  Amer 04/23/2021 71  >60 mL/min/1.73 Final   • Globulin 04/23/2021 2.8  gm/dL Final   • A/G Ratio 04/23/2021 1.5  g/dL Final   • BUN/Creatinine Ratio 04/23/2021 23.8  7.0 - 25.0 Final   • Anion Gap 04/23/2021 8.0  5.0 - 15.0 mmol/L Final   • WBC 04/23/2021 8.88  3.40 - 10.80 10*3/mm3 Final   • RBC 04/23/2021 4.46  3.77 - 5.28 10*6/mm3 Final   • Hemoglobin 04/23/2021 14.2  12.0 - 15.9 g/dL Final   • Hematocrit 04/23/2021 42.9  34.0 - 46.6 % Final   • MCV 04/23/2021 96.2  79.0 - 97.0 fL Final   • MCH 04/23/2021 31.8  26.6 - 33.0 pg Final   • MCHC 04/23/2021 33.1  31.5 - 35.7 g/dL Final   • RDW 04/23/2021 14.7  12.3 - 15.4 % Final   • RDW-SD 04/23/2021 51.7  37.0 - 54.0 fl Final   • MPV 04/23/2021 9.6  6.0 - 12.0 fL Final   • Platelets 04/23/2021 393  140 - 450 10*3/mm3 Final   • Neutrophil % 04/23/2021 70.0  42.7 - 76.0 % Final   • Lymphocyte % 04/23/2021 18.1* 19.6 - 45.3 % Final   • Monocyte % 04/23/2021 8.1  5.0 - 12.0 % Final   • Eosinophil % 04/23/2021 2.6  0.3 - 6.2 % Final   • Basophil % 04/23/2021 1.0  0.0 - 1.5 % Final   • Immature Grans % 04/23/2021 0.2  0.0 - 0.5 % Final   • Neutrophils, Absolute  04/23/2021 6.21  1.70 - 7.00 10*3/mm3 Final   • Lymphocytes, Absolute 04/23/2021 1.61  0.70 - 3.10 10*3/mm3 Final   • Monocytes, Absolute 04/23/2021 0.72  0.10 - 0.90 10*3/mm3 Final   • Eosinophils, Absolute 04/23/2021 0.23  0.00 - 0.40 10*3/mm3 Final   • Basophils, Absolute 04/23/2021 0.09  0.00 - 0.20 10*3/mm3 Final   • Immature Grans, Absolute 04/23/2021 0.02  0.00 - 0.05 10*3/mm3 Final   • nRBC 04/23/2021 0.0  0.0 - 0.2 /100 WBC Final        No results found.    ASSESSMENT: The patient is a very pleasant 70 y.o. female  with mandible diffuse large B-cell lymphoma.    PROBLEM LIST:  1. Mandible diffuse large B-cell lymphoma, stage IIE:  A.  Presented with gum pain and swelling  B.  CT facial bones done on February 26, 2019 revealed 2.3 cm lucency in the anterior mandible  C.  Status post biopsy done on March 7, 2019 consistent with diffuse large B-cell lymphoma  D.  Bone marrow biopsy done on March 25, 2019 no evidence of lymphoma involvement.  E.  Whole body PET scan done on March 28, 2019 revealed hypermetabolic activity in the right curtis-mandible as well as jugular cervical chains hypermetabolic lymphadenopathy level 2 and level 3.  F. Started chemotherapy with R-CHOP April 2019,status post 6 cycles, completed July 22, 2019.  2.  Osteoarthritis  3.  Active bladder  4.  Heartburn    PLAN:  1.  I reviewed the CAT scan results from April 29, 2021 with the patient. I did reassure the patient that she has no evidence of relapsed lymphoma without new or progressive disease.  I will change her scans to annually at this point her next scan will be due end of April 2022.  2. I reviewed the lab results from April 27, 2021 with the patient. I reassured her that her blood counts are within normal limits, as well as kidney function, liver enzymes, and electrolytes.   3. We will see the patient back in 6 months with repeat labs including CBC and CMP.  4. We will continue Mobic 7.5 mg as needed for osteoarthritis.    5. She  will continue oxybutynin 10 mg for over active bladder.   6. I asked the patient to call and return sooner with any concerning symptoms such as weight loss lymphadenopathy, fever, chills, or night sweats.   7. She will continue omeprazole 20 mg daily for acid reflux.   Joan Zuluaga MD  5/4/2021

## 2021-07-06 ENCOUNTER — TELEPHONE (OUTPATIENT)
Dept: FAMILY MEDICINE CLINIC | Facility: CLINIC | Age: 71
End: 2021-07-06

## 2021-10-07 RX ORDER — OMEPRAZOLE 20 MG/1
CAPSULE, DELAYED RELEASE ORAL
Qty: 90 CAPSULE | Refills: 1 | Status: SHIPPED | OUTPATIENT
Start: 2021-10-07 | End: 2022-03-28

## 2021-11-02 ENCOUNTER — OFFICE VISIT (OUTPATIENT)
Dept: ONCOLOGY | Facility: CLINIC | Age: 71
End: 2021-11-02

## 2021-11-02 ENCOUNTER — LAB (OUTPATIENT)
Dept: LAB | Facility: HOSPITAL | Age: 71
End: 2021-11-02

## 2021-11-02 VITALS
RESPIRATION RATE: 16 BRPM | BODY MASS INDEX: 29.41 KG/M2 | HEIGHT: 63 IN | OXYGEN SATURATION: 98 % | DIASTOLIC BLOOD PRESSURE: 68 MMHG | TEMPERATURE: 96.2 F | SYSTOLIC BLOOD PRESSURE: 129 MMHG | WEIGHT: 166 LBS | HEART RATE: 82 BPM

## 2021-11-02 DIAGNOSIS — C83.31 DIFFUSE LARGE B-CELL LYMPHOMA OF LYMPH NODES OF HEAD (HCC): Primary | ICD-10-CM

## 2021-11-02 DIAGNOSIS — C83.31 DIFFUSE LARGE B-CELL LYMPHOMA OF LYMPH NODES OF HEAD (HCC): ICD-10-CM

## 2021-11-02 LAB
ALBUMIN SERPL-MCNC: 4.2 G/DL (ref 3.5–5.2)
ALBUMIN/GLOB SERPL: 1.4 G/DL
ALP SERPL-CCNC: 76 U/L (ref 39–117)
ALT SERPL W P-5'-P-CCNC: 23 U/L (ref 1–33)
ANION GAP SERPL CALCULATED.3IONS-SCNC: 12 MMOL/L (ref 5–15)
AST SERPL-CCNC: 27 U/L (ref 1–32)
BILIRUB SERPL-MCNC: 0.6 MG/DL (ref 0–1.2)
BUN SERPL-MCNC: 17 MG/DL (ref 8–23)
BUN/CREAT SERPL: 17.5 (ref 7–25)
CALCIUM SPEC-SCNC: 9.1 MG/DL (ref 8.6–10.5)
CHLORIDE SERPL-SCNC: 107 MMOL/L (ref 98–107)
CO2 SERPL-SCNC: 26 MMOL/L (ref 22–29)
CREAT SERPL-MCNC: 0.97 MG/DL (ref 0.57–1)
ERYTHROCYTE [DISTWIDTH] IN BLOOD BY AUTOMATED COUNT: 14.3 % (ref 12.3–15.4)
GFR SERPL CREATININE-BSD FRML MDRD: 56 ML/MIN/1.73
GLOBULIN UR ELPH-MCNC: 2.9 GM/DL
GLUCOSE SERPL-MCNC: 85 MG/DL (ref 65–99)
HCT VFR BLD AUTO: 43.1 % (ref 34–46.6)
HGB BLD-MCNC: 14.1 G/DL (ref 12–15.9)
LYMPHOCYTES # BLD AUTO: 1.6 10*3/MM3 (ref 0.7–3.1)
LYMPHOCYTES NFR BLD AUTO: 24.2 % (ref 19.6–45.3)
MCH RBC QN AUTO: 30.6 PG (ref 26.6–33)
MCHC RBC AUTO-ENTMCNC: 32.8 G/DL (ref 31.5–35.7)
MCV RBC AUTO: 93.4 FL (ref 79–97)
MONOCYTES # BLD AUTO: 0.3 10*3/MM3 (ref 0.1–0.9)
MONOCYTES NFR BLD AUTO: 4.6 % (ref 5–12)
NEUTROPHILS NFR BLD AUTO: 4.6 10*3/MM3 (ref 1.7–7)
NEUTROPHILS NFR BLD AUTO: 71.2 % (ref 42.7–76)
PLATELET # BLD AUTO: 426 10*3/MM3 (ref 140–450)
PMV BLD AUTO: 7.4 FL (ref 6–12)
POTASSIUM SERPL-SCNC: 4.5 MMOL/L (ref 3.5–5.2)
PROT SERPL-MCNC: 7.1 G/DL (ref 6–8.5)
RBC # BLD AUTO: 4.61 10*6/MM3 (ref 3.77–5.28)
SODIUM SERPL-SCNC: 145 MMOL/L (ref 136–145)
WBC # BLD AUTO: 6.5 10*3/MM3 (ref 3.4–10.8)

## 2021-11-02 PROCEDURE — 99213 OFFICE O/P EST LOW 20 MIN: CPT | Performed by: NURSE PRACTITIONER

## 2021-11-02 PROCEDURE — 36415 COLL VENOUS BLD VENIPUNCTURE: CPT

## 2021-11-02 PROCEDURE — 85025 COMPLETE CBC W/AUTO DIFF WBC: CPT

## 2021-11-02 PROCEDURE — 80053 COMPREHEN METABOLIC PANEL: CPT

## 2021-11-02 NOTE — PROGRESS NOTES
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  Staging workup revealed stage IIE disease.  Patient was started on definitive chemotherapy with R CHOP April 8, 2019.  The patient completed cycle #6 June 22, 2019.  The patient is here today for scheduled follow-up visit.    SUBJECTIVE: The patient is here today by herself. She has been doing well since her last visit with no changes in health history. She denies fever, chills, night sweats, or unexplained weight loss. She is back to working part time which she enjoys. She has been vaccinated for flu and COVID, including her COVID booster, for the season.   PAST MEDICAL HISTORY/SOCIAL HISTORY/FAMILY HISTORY: Reviewed by me and unchanged from my documentation done on 11/02/21.    Review of Systems   Constitutional: Negative for activity change, appetite change, chills, fatigue, fever and unexpected weight change.   HENT: Negative for hearing loss, mouth sores, nosebleeds, sore throat and trouble swallowing.    Eyes: Negative for visual disturbance.   Respiratory: Negative for cough, chest tightness, shortness of breath and wheezing.    Cardiovascular: Negative for chest pain, palpitations and leg swelling.   Gastrointestinal: Negative for abdominal distention, abdominal pain, blood in stool, constipation, diarrhea, nausea, rectal pain and vomiting.   Endocrine: Negative for cold intolerance and heat intolerance.   Genitourinary: Positive for frequency. Negative for difficulty urinating, dysuria and urgency.        Overactive bladder   Musculoskeletal: Positive for arthralgias. Negative for back pain, gait problem, joint swelling and myalgias.   Skin: Negative for rash.   Neurological: Negative for dizziness, tremors, syncope, weakness,  "light-headedness, numbness and headaches.   Hematological: Negative for adenopathy. Does not bruise/bleed easily.   Psychiatric/Behavioral: Negative for confusion, sleep disturbance and suicidal ideas. The patient is not nervous/anxious.          Current Outpatient Medications:   •  CALCIUM PO, Take 600 mg by mouth Daily., Disp: , Rfl:   •  furosemide (LASIX) 20 MG tablet, Take 1 tablet by mouth As Needed (fluid retention). For fluid retention, Disp: 10 tablet, Rfl: 0  •  Multiple Vitamins-Minerals (MULTIVITAMIN ADULT PO), Take 1 tablet by mouth Daily., Disp: , Rfl:   •  omeprazole (priLOSEC) 20 MG capsule, TAKE ONE CAPSULE BY MOUTH EVERY NIGHT AT BEDTIME, Disp: 90 capsule, Rfl: 1    PHYSICAL EXAMINATION:   /68   Pulse 82   Temp 96.2 °F (35.7 °C) (Temporal)   Resp 16   Ht 160 cm (62.99\")   Wt 75.3 kg (166 lb)   SpO2 98%   BMI 29.41 kg/m²    Pain Score    11/02/21 1124   PainSc: 0-No pain        ECOG Performance Status: 1 - Symptomatic but completely ambulatory  General Appearance:  alert, cooperative, no apparent distress and appears stated age   Neurologic/Psychiatric: A&O x 3, gait steady, appropriate affect, strength 5/5 in all muscle groups   HEENT:  Normocephalic, without obvious abnormality, mucous membranes moist   Neck: Supple, symmetrical, trachea midline, no adenopathy;  No thyromegaly, masses, or tenderness   Lungs:   Clear to auscultation bilaterally; respirations regular, even, and unlabored bilaterally   Heart:  Regular rate and rhythm, no murmurs appreciated   Abdomen:   Soft, non-tender, non-distended and no organomegaly   Lymph nodes: No cervical, supraclavicular, inguinal or axillary adenopathy noted   Extremities: Normal, atraumatic; no clubbing, cyanosis, or edema    Skin: No rashes, ulcers, or suspicious lesions noted     Lab on 11/02/2021   Component Date Value Ref Range Status   • WBC 11/02/2021 6.50  3.40 - 10.80 10*3/mm3 Final   • RBC 11/02/2021 4.61  3.77 - 5.28 10*6/mm3 Final "   • Hemoglobin 11/02/2021 14.1  12.0 - 15.9 g/dL Final   • Hematocrit 11/02/2021 43.1  34.0 - 46.6 % Final   • RDW 11/02/2021 14.3  12.3 - 15.4 % Final   • MCV 11/02/2021 93.4  79.0 - 97.0 fL Final   • MCH 11/02/2021 30.6  26.6 - 33.0 pg Final   • MCHC 11/02/2021 32.8  31.5 - 35.7 g/dL Final   • MPV 11/02/2021 7.4  6.0 - 12.0 fL Final   • Platelets 11/02/2021 426  140 - 450 10*3/mm3 Final   • Neutrophil % 11/02/2021 71.2  42.7 - 76.0 % Final   • Lymphocyte % 11/02/2021 24.2  19.6 - 45.3 % Final   • Monocyte % 11/02/2021 4.6* 5.0 - 12.0 % Final   • Neutrophils, Absolute 11/02/2021 4.60  1.70 - 7.00 10*3/mm3 Final   • Lymphocytes, Absolute 11/02/2021 1.60  0.70 - 3.10 10*3/mm3 Final   • Monocytes, Absolute 11/02/2021 0.30  0.10 - 0.90 10*3/mm3 Final        No results found.    ASSESSMENT: The patient is a very pleasant 71 y.o. female  with mandible diffuse large B-cell lymphoma.    PROBLEM LIST:  1. Mandible diffuse large B-cell lymphoma, stage IIE:  A.  Presented with gum pain and swelling  B.  CT facial bones done on February 26, 2019 revealed 2.3 cm lucency in the anterior mandible  C.  Status post biopsy done on March 7, 2019 consistent with diffuse large B-cell lymphoma  D.  Bone marrow biopsy done on March 25, 2019 no evidence of lymphoma involvement.  E.  Whole body PET scan done on March 28, 2019 revealed hypermetabolic activity in the right curtis-mandible as well as jugular cervical chains hypermetabolic lymphadenopathy level 2 and level 3.  F. Started chemotherapy with R-CHOP April 2019,status post 6 cycles, completed July 22, 2019.  2.  Osteoarthritis  3.  Active bladder  4.  Heartburn    PLAN:  1. I reviewed the lab results form today with the patient. I reassured her that her blood counts are within normal limits including WBC, hemoglobin, platelet, and lymphocyte counts. We will follow up on the CMP results and notify her of significant findings.   2. We will order repeat CAT scans of chest, abdomen, and  pelvis for prior to return. This will be one year follow up imaging for surveillance for lymphoma. She would like to have these done through [facility] due to high out of pocket cost.   3. We will see her back in 6 months with repeat scans as well as labs including CBC and CMP ordered for prior to return.   4. She will continue annual follow up with her PCP Dr. Byrne for Medicare wellness exam.   5. She has been fully vaccinated against COVID-19 and has received her annual flu shot.   6. She will continue omeprazole 20 mg daily for acid reflux.   7. She will continue daily multivitamin supplement.   8. I advised she continue to monitor for any signs of relapsed disease including fevers, chills, unexplained weight loss, night sweats, or lymphadenopathy.     Migdalia Blanca, APRN  11/2/2021

## 2021-11-29 ENCOUNTER — OFFICE VISIT (OUTPATIENT)
Dept: FAMILY MEDICINE CLINIC | Facility: CLINIC | Age: 71
End: 2021-11-29

## 2021-11-29 VITALS
SYSTOLIC BLOOD PRESSURE: 130 MMHG | BODY MASS INDEX: 29.73 KG/M2 | OXYGEN SATURATION: 99 % | HEIGHT: 63 IN | HEART RATE: 90 BPM | WEIGHT: 167.8 LBS | DIASTOLIC BLOOD PRESSURE: 80 MMHG

## 2021-11-29 DIAGNOSIS — Z23 IMMUNIZATION DUE: ICD-10-CM

## 2021-11-29 DIAGNOSIS — R60.9 PERIPHERAL EDEMA: ICD-10-CM

## 2021-11-29 DIAGNOSIS — M85.88 OTHER SPECIFIED DISORDERS OF BONE DENSITY AND STRUCTURE, OTHER SITE: ICD-10-CM

## 2021-11-29 DIAGNOSIS — Z00.00 MEDICARE ANNUAL WELLNESS VISIT, SUBSEQUENT: Primary | ICD-10-CM

## 2021-11-29 DIAGNOSIS — F51.01 PRIMARY INSOMNIA: ICD-10-CM

## 2021-11-29 PROCEDURE — 1125F AMNT PAIN NOTED PAIN PRSNT: CPT | Performed by: FAMILY MEDICINE

## 2021-11-29 PROCEDURE — 99397 PER PM REEVAL EST PAT 65+ YR: CPT | Performed by: FAMILY MEDICINE

## 2021-11-29 PROCEDURE — G0439 PPPS, SUBSEQ VISIT: HCPCS | Performed by: FAMILY MEDICINE

## 2021-11-29 PROCEDURE — 1170F FXNL STATUS ASSESSED: CPT | Performed by: FAMILY MEDICINE

## 2021-11-29 PROCEDURE — 96160 PT-FOCUSED HLTH RISK ASSMT: CPT | Performed by: FAMILY MEDICINE

## 2021-11-29 PROCEDURE — 1159F MED LIST DOCD IN RCRD: CPT | Performed by: FAMILY MEDICINE

## 2021-11-29 RX ORDER — TRAZODONE HYDROCHLORIDE 50 MG/1
25-100 TABLET ORAL NIGHTLY PRN
Qty: 60 TABLET | Refills: 11 | Status: SHIPPED | OUTPATIENT
Start: 2021-11-29 | End: 2022-12-12 | Stop reason: SDUPTHER

## 2021-11-29 NOTE — PATIENT INSTRUCTIONS
Advance Care Planning and Advance Directives     You make decisions on a daily basis - decisions about where you want to live, your career, your home, your life. Perhaps one of the most important decisions you face is your choice for future medical care. Take time to talk with your family and your healthcare team and start planning today.  Advance Care Planning is a process that can help you:  · Understand possible future healthcare decisions in light of your own experiences  · Reflect on those decision in light of your goals and values  · Discuss your decisions with those closest to you and the healthcare professionals that care for you  · Make a plan by creating a document that reflects your wishes    Surrogate Decision Maker  In the event of a medical emergency, which has left you unable to communicate or to make your own decisions, you would need someone to make decisions for you.  It is important to discuss your preferences for medical treatment with this person while you are in good health.     Qualities of a surrogate decision maker:  • Willing to take on this role and responsibility  • Knows what you want for future medical care  • Willing to follow your wishes even if they don't agree with them  • Able to make difficult medical decisions under stressful circumstances    Advance Directives  These are legal documents you can create that will guide your healthcare team and decision maker(s) when needed. These documents can be stored in the electronic medical record.    · Living Will - a legal document to guide your care if you have a terminal condition or a serious illness and are unable to communicate. States vary by statute in document names/types, but most forms may include one or more of the following:        -  Directions regarding life-prolonging treatments        -  Directions regarding artificially provided nutrition/hydration        -  Choosing a healthcare decision maker        -  Direction  regarding organ/tissue donation    · Durable Power of  for Healthcare - this document names an -in-fact to make medical decisions for you, but it may also allow this person to make personal and financial decisions for you. Please seek the advice of an  if you need this type of document.    **Advance Directives are not required and no one may discriminate against you if you do not sign one.    Medical Orders  Many states allow specific forms/orders signed by your physician to record your wishes for medical treatment in your current state of health. This form, signed in personal communication with your physician, addresses resuscitation and other medical interventions that you may or may not want.      For more information or to schedule a time with a [facility] Advance Care Planning Facilitator contact: [facility] or call [phone] and someone will contact you directly.  You are due for adacel Tdap vaccination. (provides protection against tetanus, diptheria and whooping cough) Please  get the immunization at your local pharmacy at your earliest convenience. This immunization will next be due in 10 years. Please click on the link for more information about this vaccine.    Formerly named [facility] Tdap Vaccine Information    You are due for Shingrix vaccination series ( the newest shingles vaccine).  It is a two shot series spaced 2-6 months apart. Please get this vaccine series started at your earliest convenience at your local pharmacy to help avoid shingles outbreak. It is more effective than the old Zostavax vaccine and is recommended even if you have had the Zostavax vaccine in the past.  Once the Shingrix series is completed, it does not need to be repeated.   For more information, please look at the website below:  Formerly named [facility] Shingrix Vaccine Information      Medicare Wellness  Personal Prevention Plan of Service     Date of Office Visit:    Encounter Provider:  Janette Byrne  MD  Place of Service:  [facility]  Patient Name: Lanette Simpson  :  1950    As part of the Medicare Wellness portion of your visit today, we are providing you with this personalized preventive plan of services (PPPS). This plan is based upon recommendations of the [place] Preventive Services Task Force (USPSTF) and the Advisory Committee on Immunization Practices (ACIP).    This lists the preventive care services that should be considered, and provides dates of when you are due. Items listed as completed are up-to-date and do not require any further intervention.    Health Maintenance   Topic Date Due   • Pneumococcal Vaccine 65+ (1 of 4 - PCV13) Never done   • TDAP/TD VACCINES (1 - Tdap) Never done   • ZOSTER VACCINE (1 of 2) Never done   • DXA SCAN  2016   • HEPATITIS C SCREENING  Never done   • COVID-19 Vaccine (4 - Booster for Moderna series) 2022   • MAMMOGRAM  2022   • ANNUAL WELLNESS VISIT  2022   • COLORECTAL CANCER SCREENING  2023   • INFLUENZA VACCINE  Completed       Orders Placed This Encounter   Procedures   • DEXA Bone Density Axial     Standing Status:   Future     Standing Expiration Date:   2022     Order Specific Question:   Reason for Exam:     Answer:   screen     Order Specific Question:   Release to patient     Answer:   Immediate       Return in about 1 year (around 2022) for MWV.        Mediterranean Diet  A Mediterranean diet refers to food and lifestyle choices that are based on the traditions of countries located on the [place]. This way of eating has been shown to help prevent certain conditions and improve outcomes for people who have chronic diseases, like kidney disease and heart disease.  What are tips for following this plan?  Lifestyle  · Cook and eat meals together with your family, when possible.  · Drink enough fluid to keep your urine clear or pale yellow.  · Be physically  active every day. This includes:  ? Aerobic exercise like running or swimming.  ? Leisure activities like gardening, walking, or housework.  · Get 7-8 hours of sleep each night.  · If recommended by your health care provider, drink red wine in moderation. This means 1 glass a day for nonpregnant women and 2 glasses a day for men. A glass of wine equals 5 oz (150 mL).  Reading food labels    · Check the serving size of packaged foods. For foods such as rice and pasta, the serving size refers to the amount of cooked product, not dry.  · Check the total fat in packaged foods. Avoid foods that have saturated fat or trans fats.  · Check the ingredients list for added sugars, such as corn syrup.    Shopping  · At the grocery store, buy most of your food from the areas near the walls of the store. This includes:  ? Fresh fruits and vegetables (produce).  ? Grains, beans, nuts, and seeds. Some of these may be available in unpackaged forms or large amounts (in bulk).  ? Fresh seafood.  ? Poultry and eggs.  ? Low-fat dairy products.  · Buy whole ingredients instead of prepackaged foods.  · Buy fresh fruits and vegetables in-season from local [employer] markets.  · Buy frozen fruits and vegetables in resealable bags.  · If you do not have access to quality fresh seafood, buy precooked frozen shrimp or canned fish, such as tuna, salmon, or sardines.  · Buy small amounts of raw or cooked vegetables, salads, or olives from the deli or salad bar at your store.  · Stock your pantry so you always have certain foods on hand, such as olive oil, canned tuna, canned tomatoes, rice, pasta, and beans.  Cooking  · Cook foods with extra-virgin olive oil instead of using butter or other vegetable oils.  · Have meat as a side dish, and have vegetables or grains as your main dish. This means having meat in small portions or adding small amounts of meat to foods like pasta or stew.  · Use beans or vegetables instead of meat in common dishes like  chili or lasagna.  · [place] with different cooking methods. Try roasting or broiling vegetables instead of steaming or sautéeing them.  · Add frozen vegetables to soups, stews, pasta, or rice.  · Add nuts or seeds for added healthy fat at each meal. You can add these to yogurt, salads, or vegetable dishes.  · Marinate fish or vegetables using olive oil, lemon juice, garlic, and fresh herbs.  Meal planning    · Plan to eat 1 vegetarian meal one day each week. Try to work up to 2 vegetarian meals, if possible.  · Eat seafood 2 or more times a week.  · Have healthy snacks readily available, such as:  ? Vegetable sticks with hummus.  ? Greek yogurt.  ? Fruit and nut trail mix.  · Eat balanced meals throughout the week. This includes:  ? Fruit: 2-3 servings a day  ? Vegetables: 4-5 servings a day  ? Low-fat dairy: 2 servings a day  ? Fish, poultry, or lean meat: 1 serving a day  ? Beans and legumes: 2 or more servings a week  ? Nuts and seeds: 1-2 servings a day  ? Whole grains: 6-8 servings a day  ? Extra-virgin olive oil: 3-4 servings a day  · Limit red meat and sweets to only a few servings a month    What are my food choices?  · Mediterranean diet  ? Recommended  § Grains: Whole-grain pasta. Brown rice. Bulgar wheat. Polenta. Couscous. Whole-wheat bread. Oatmeal. Quinoa.  § Vegetables: Artichokes. Beets. Broccoli. Cabbage. Carrots. Eggplant. Green beans. Chard. Kale. Spinach. Onions. Leeks. Peas. Squash. Tomatoes. Peppers. Radishes.  § Fruits: Apples. Apricots. Avocado. Berries. Bananas. Cherries. Dates. Figs. Grapes. Ang. Melon. Oranges. Peaches. Plums. Pomegranate.  § Meats and other protein foods: Beans. Almonds. Sunflower seeds. Pine nuts. Peanuts. Cod. [place]. Scallops. Shrimp. Tuna. Tilapia. Clams. Oysters. Eggs.  § Dairy: Low-fat milk. Cheese. Greek yogurt.  § Beverages: Water. Red wine. Herbal tea.  § Fats and oils: Extra virgin olive oil. Avocado oil. Grape seed oil.  § Sweets and desserts: Greek  yogurt with honey. Baked apples. Poached pears. Trail mix.  § Seasoning and other foods: Basil. Cilantro. Coriander. Cumin. Mint. Parsley. [place]. Rosemary. Tarragon. Garlic. Oregano. Thyme. Pepper. Balsalmic vinegar. Tahini. Hummus. Tomato sauce. Olives. Mushrooms.  ? Limit these  § Grains: Prepackaged pasta or rice dishes. Prepackaged cereal with added sugar.  § Vegetables: Deep fried potatoes (french fries).  § Fruits: Fruit canned in syrup.  § Meats and other protein foods: Beef. Pork. Lamb. Poultry with skin. Hot dogs. Terrazas.  § Dairy: Ice cream. Sour cream. Whole milk.  § Beverages: Juice. Sugar-sweetened soft drinks. Beer. Liquor and spirits.  § Fats and oils: Butter. Canola oil. Vegetable oil. Beef fat (tallow). Lard.  § Sweets and desserts: Cookies. Cakes. Pies. Candy.  § Seasoning and other foods: Mayonnaise. Premade sauces and marinades.  The items listed may not be a complete list. Talk with your dietitian about what dietary choices are right for you.  Summary  · The Mediterranean diet includes both food and lifestyle choices.  · Eat a variety of fresh fruits and vegetables, beans, nuts, seeds, and whole grains.  · Limit the amount of red meat and sweets that you eat.  · Talk with your health care provider about whether it is safe for you to drink red wine in moderation. This means 1 glass a day for nonpregnant women and 2 glasses a day for men. A glass of wine equals 5 oz (150 mL).  This information is not intended to replace advice given to you by your health care provider. Make sure you discuss any questions you have with your health care provider.  Document Revised: 08/17/2017 Document Reviewed: 08/10/2017  [employer] Patient Education © 2020 [employer]    Exercise    • Less pain, better mood, and lower risk of many diseases  • Some physical activity is better than none. Try to sit less throughout the day  • Fitness is free--No equipment needed to walk, run/jog, dance, hike, Isael Chi  • 150 minutes (2  hours and 30 minutes) to 300 minutes (5 hours) a week of moderate-intensity aerobic physical activity, or 75 minutes (1 hour and 15 minutes) to 150 minutes (2 hours and 30 minutes) a week of vigorous-intensity aerobic physical activity has substantial health benefits  • Muscle strengthening exercises 2 days per week  • Older adults should incorporate balance training   • Bones need pressure to get stronger. Weight bearing exercises include running/jogging, dancing, hiking, elliptical, treadmill, stair climbing, jumping rope, aerobics.   • Joint friendly low impact activities include walking, biking, swimming, yoga, Isael Chi, dance. Include range of motion and stretching exercises to improve flexibility.   • https://health.gov/moveyourway   •

## 2021-11-29 NOTE — PROGRESS NOTES
The ABCs of the Annual Wellness Visit  Subsequent Medicare Wellness Visit    Chief Complaint   Patient presents with   • Medicare Wellness-subsequent   • Annual Exam   • Leg Swelling     Pt states ankle pain and swelling on and off, but becoming more frequent   • Insomnia     Pt states trouble sleeping and falling asleep      Subjective    History of Present Illness:  Lanette Simpson is a 71 y.o. female who presents for a Subsequent Medicare Wellness Visit.    Part time job at [employer]. No exercise routine.     She eats a lot of fruits and vegetables. During chemo she liked celery and applesauce with cinnamon.     She had a shot in left knee, knee is not bothering her. Swelling in left ankle only. She wears compression borrowed from her . It helps, but swelling aggravating. Goes down by the morning and increases during the day.     She can't sleep. She has taken extended release melatonin, helps for 2-3 hours.     The following portions of the patient's history were reviewed and   updated as appropriate: allergies, current medications, past family history, past medical history, past social history, past surgical history and problem list.    Compared to one year ago, the patient feels her physical   health is better. Stronger, more aches and pains.    Compared to one year ago, the patient feels her mental   health is worse. 's health is worse. More stress.     Recent Hospitalizations:  She was not admitted to the hospital during the last year.       Current Medical Providers:  Patient Care Team:  Janette Rodriguez MD as PCP - General (Family Medicine)  Joan Zuluaga MD as Consulting Physician (Hematology and Oncology)    Outpatient Medications Prior to Visit   Medication Sig Dispense Refill   • CALCIUM PO Take 600 mg by mouth Daily.     • Multiple Vitamins-Minerals (MULTIVITAMIN ADULT PO) Take 1 tablet by mouth Daily.     • omeprazole (priLOSEC) 20 MG capsule TAKE ONE CAPSULE  "BY MOUTH EVERY NIGHT AT BEDTIME 90 capsule 1   • furosemide (LASIX) 20 MG tablet Take 1 tablet by mouth As Needed (fluid retention). For fluid retention 10 tablet 0     No facility-administered medications prior to visit.       No opioid medication identified on active medication list. I have reviewed chart for other potential  high risk medication/s and harmful drug interactions in the elderly.          Aspirin is not on active medication list.  Aspirin use is not indicated based on review of current medical condition/s. Risk of harm outweighs potential benefits.  .    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  ACP discussion was held with the patient during this visit. Patient has an advance directive (not in EMR), copy requested. she has at home          Objective    Vitals:    11/29/21 1405   BP: 130/80   Pulse: 90   SpO2: 99%   Weight: 76.1 kg (167 lb 12.8 oz)   Height: 160 cm (62.99\")   PainSc:   1     BMI Readings from Last 1 Encounters:   11/29/21 29.73 kg/m²   BMI is above normal parameters. Recommendations include: exercise counseling    Does the patient have evidence of cognitive impairment? No    Physical Exam            HEALTH RISK ASSESSMENT    Smoking Status:  Social History     Tobacco Use   Smoking Status Never Smoker   Smokeless Tobacco Never Used     Alcohol Consumption:  Social History     Substance and Sexual Activity   Alcohol Use No     Fall Risk Screen:    STEADI Fall Risk Assessment was completed, and patient is at LOW risk for falls.Assessment completed on:11/29/2021  STESTACY Fall Risk Clinician Key Questions   Have you fallen in the past year?: No  Do you feel unsteady with walking?: No  Are you worried about " falling?: No    Stay Idependant Patient Questions   Have you been advised to use a cane or a walker to get around safely?: No  Do you steady yourself by holding onto furniture when walking at home?: No  Do you need to push with your hands to stand up from a chair?: No  Do you have trouble stepping up onto a curb?: No  Do you have to rush to the toilet?: Yes  Have you lost some feeling in your feet?: Yes  Do you take medicine that sometimes makes you feel light headed or more tired than usual?: No  Do you take medicine to help you sleep or improve your mood?: Yes  Do you often feel sad or depressed?: No  Patient Fall Risk Assessment Score : 3      Depression Screening:  PHQ-2/PHQ-9 Depression Screening 11/29/2021   Little interest or pleasure in doing things 0   Feeling down, depressed, or hopeless 0   Total Score 0       Health Habits and Functional and Cognitive Screening:  Functional & Cognitive Status 11/29/2021   Do you have difficulty preparing food and eating? No   Do you have difficulty bathing yourself, getting dressed or grooming yourself? No   Do you have difficulty using the toilet? No   Do you have difficulty moving around from place to place? No   Do you have trouble with steps or getting out of a bed or a chair? No   Current Diet Well Balanced Diet   Dental Exam Up to date   Eye Exam Up to date        Eye Exam Comment -   Exercise (times per week) 0 times per week   Current Exercises Include Other   Current Exercise Activities Include -   Do you need help using the phone?  No   Are you deaf or do you have serious difficulty hearing?  Yes   Do you need help with transportation? No   Do you need help shopping? No   Do you need help preparing meals?  No   Do you need help with housework?  No   Do you need help with laundry? No   Do you need help taking your medications? No   Do you need help managing money? No   Do you ever drive or ride in a car without wearing a seat belt? No   Have you felt unusual  stress, anger or loneliness in the last month? Yes   Who do you live with? Spouse   If you need help, do you have trouble finding someone available to you? No   Have you been bothered in the last four weeks by sexual problems? No   Do you have difficulty concentrating, remembering or making decisions? Yes       Age-appropriate Screening Schedule:  Refer to the list below for future screening recommendations based on patient's age, sex and/or medical conditions. Orders for these recommended tests are listed in the plan section. The patient has been provided with a written plan.    Health Maintenance   Topic Date Due   • TDAP/TD VACCINES (1 - Tdap) Never done   • ZOSTER VACCINE (1 of 2) Never done   • DXA SCAN  11/12/2016   • MAMMOGRAM  06/09/2022   • INFLUENZA VACCINE  Completed              Immunization History   Administered Date(s) Administered   • COVID-19 (MODERNA) 1st, 2nd, 3rd Dose Only 01/08/2021, 02/05/2021, 10/24/2021   • Fluad Quad 65+ 10/26/2021   • Fluzone High Dose =>65 Years ([facility] ONLY) 09/25/2020   Office Visit with Migdalia Blanca APRN (11/02/2021)  Comprehensive Metabolic Panel (11/02/2021 11:16)  CBC & Differential (11/02/2021 11:16)      Assessment/Plan   CMS Preventative Services Quick Reference  Risk Factors Identified During Encounter  Immunizations Discussed/Encouraged (specific Immunizations; Pneumococcal 23  The above risks/problems have been discussed with the patient.  Follow up actions/plans if indicated are seen below in the Assessment/Plan Section.  Pertinent information has been shared with the patient in the After Visit Summary.    Diagnoses and all orders for this visit:    1. Medicare annual wellness visit, subsequent (Primary)  Counseled on health maintenance topics and preventative care recommendations: diet, exercise    2. Immunization due  Check vaccine records at local pharmacy for Pneumovax.  3. Other specified disorders of bone density and structure, other site  -      DEXA Bone Density Axial; Future    4. Peripheral edema  Discussed use of compression socks. In past she was prescribed Lasix by another provider and if her swelling continues would resume previous prescription.  5. Primary insomnia  -     traZODone (DESYREL) 50 MG tablet; Take 0.5-2 tablets by mouth At Night As Needed for Sleep.  Dispense: 60 tablet; Refill: 11  New. At this time start trazodone. Discussed potential side effects to monitor for.      Follow Up:   Return in about 1 year (around 11/29/2022) for MWV.     An After Visit Summary and PPPS were made available to the patient.              Electronically signed by Janette Byrne MD, 11/29/21, 5:03 PM EST.

## 2021-12-07 ENCOUNTER — TELEPHONE (OUTPATIENT)
Dept: FAMILY MEDICINE CLINIC | Facility: CLINIC | Age: 71
End: 2021-12-07

## 2021-12-07 NOTE — TELEPHONE ENCOUNTER
Attempted to contact, no answer LVM asking for a return call to schedule a time    Hub can relay and schedule in-office procedure.

## 2021-12-07 NOTE — TELEPHONE ENCOUNTER
Caller: Lanette Simpson    Relationship to patient: Self    Best call back number: [phone]   Chief complaint:LEFT KNEE PAY AND SWELLING   Type of visit:  IN-OFFICE PROCEDURE (INJECTION)    Requested date: ASAP    If rescheduling, when is the original appointment: N/A    Additional notes: PLEASE CALL AND ADVISE -[phone]. THE PATIENT REQUESTS THAT A DETAILED MESSAGE BE LEFT IF UNABLE TO REACH HER.

## 2021-12-07 NOTE — TELEPHONE ENCOUNTER
Please advise. Didn't see any orders for this? Would you just like pt to have follow up appt? Please advise, thanks

## 2021-12-27 ENCOUNTER — LAB (OUTPATIENT)
Dept: LAB | Facility: HOSPITAL | Age: 71
End: 2021-12-27

## 2021-12-27 ENCOUNTER — PROCEDURE VISIT (OUTPATIENT)
Dept: FAMILY MEDICINE CLINIC | Facility: CLINIC | Age: 71
End: 2021-12-27

## 2021-12-27 VITALS
HEIGHT: 63 IN | OXYGEN SATURATION: 99 % | WEIGHT: 169.2 LBS | SYSTOLIC BLOOD PRESSURE: 140 MMHG | DIASTOLIC BLOOD PRESSURE: 80 MMHG | HEART RATE: 86 BPM | BODY MASS INDEX: 29.98 KG/M2 | TEMPERATURE: 97.1 F

## 2021-12-27 DIAGNOSIS — N32.81 OVERACTIVE BLADDER: Chronic | ICD-10-CM

## 2021-12-27 DIAGNOSIS — M25.562 CHRONIC PAIN OF LEFT KNEE: Primary | ICD-10-CM

## 2021-12-27 DIAGNOSIS — N39.0 ACUTE UTI: ICD-10-CM

## 2021-12-27 DIAGNOSIS — R94.4 DECREASED GFR: ICD-10-CM

## 2021-12-27 DIAGNOSIS — G89.29 CHRONIC PAIN OF LEFT KNEE: Primary | ICD-10-CM

## 2021-12-27 PROCEDURE — 20610 DRAIN/INJ JOINT/BURSA W/O US: CPT | Performed by: FAMILY MEDICINE

## 2021-12-27 PROCEDURE — 99214 OFFICE O/P EST MOD 30 MIN: CPT | Performed by: FAMILY MEDICINE

## 2021-12-27 RX ORDER — BUPIVACAINE HYDROCHLORIDE 5 MG/ML
2 INJECTION, SOLUTION EPIDURAL; INTRACAUDAL ONCE
Status: COMPLETED | OUTPATIENT
Start: 2021-12-27 | End: 2021-12-27

## 2021-12-27 RX ORDER — TRIAMCINOLONE ACETONIDE 40 MG/ML
80 INJECTION, SUSPENSION INTRA-ARTICULAR; INTRAMUSCULAR ONCE
Status: COMPLETED | OUTPATIENT
Start: 2021-12-27 | End: 2021-12-27

## 2021-12-27 RX ORDER — LIDOCAINE HYDROCHLORIDE 10 MG/ML
2 INJECTION, SOLUTION INFILTRATION; PERINEURAL ONCE
Status: COMPLETED | OUTPATIENT
Start: 2021-12-27 | End: 2021-12-27

## 2021-12-27 RX ADMIN — TRIAMCINOLONE ACETONIDE 80 MG: 40 INJECTION, SUSPENSION INTRA-ARTICULAR; INTRAMUSCULAR at 11:24

## 2021-12-27 RX ADMIN — LIDOCAINE HYDROCHLORIDE 2 ML: 10 INJECTION, SOLUTION INFILTRATION; PERINEURAL at 11:21

## 2021-12-27 RX ADMIN — BUPIVACAINE HYDROCHLORIDE 2 ML: 5 INJECTION, SOLUTION EPIDURAL; INTRACAUDAL at 11:21

## 2021-12-27 NOTE — PROGRESS NOTES
"Chief Complaint  Knee Pain (Left Knee injection ) and Kidney function    Subjective          Lanette Simpson presents to [facility]  History of Present Illness     Left knee pain and swelling for the past month.    She has had 2 cups of coffee, but no water today. She uses a little of ibuprofen, usually Tylenol. She used to take oxybutynin, but stopped 4-5 months ago. She has frequent urination.     Objective   Vital Signs:   /80   Pulse 86   Temp 97.1 °F (36.2 °C)   Ht 160 cm (62.99\")   Wt 76.7 kg (169 lb 3.2 oz)   SpO2 99%   BMI 29.98 kg/m²     Physical Exam  Constitutional:       General: She is not in acute distress.     Appearance: She is not ill-appearing.   Musculoskeletal:      Left knee: No deformity, erythema or ecchymosis. No tenderness.   Neurological:      Mental Status: She is alert.      Gait: Gait abnormal ( antalgic).        Result Review :   The following data was reviewed by: Janette Byrne MD on 12/27/2021:  Common labs    Common Labsle 4/23/21 4/23/21 11/2/21 11/2/21    1024 1024 1116 1116   Glucose  92  85   BUN  19  17   Creatinine  0.80  0.97   eGFR Non African Am  71  56 (A)   Sodium  138  145   Potassium  3.9  4.5   Chloride  102  107   Calcium  8.8  9.1   Albumin  4.20  4.20   Total Bilirubin  0.6  0.6   Alkaline Phosphatase  78  76   AST (SGOT)  30  27   ALT (SGPT)  17  23   WBC 8.88  6.50    Hemoglobin 14.2  14.1    Hematocrit 42.9  43.1    Platelets 393  426    (A) Abnormal value               Arthrocentesis    Date/Time: 12/27/2021 11:24 AM  Performed by: Janette Rodriguez MD  Authorized by: Janette Rodriguez MD   Indications: joint swelling and pain   Body area: knee  Joint: left knee  Local anesthesia used: yes    Anesthesia:  Local anesthesia used: yes  Local anesthetic: ethyl chloride spray.    Sedation:  Patient sedated: no    Preparation: Patient was prepped and draped in the usual sterile " fashion.  Needle size: 22 G  Ultrasound guidance: no  Approach: lateral  Patient tolerance: patient tolerated the procedure well with no immediate complications            Assessment and Plan    Diagnoses and all orders for this visit:    1. Chronic pain of left knee (Primary)  -     triamcinolone acetonide (KENALOG-40) injection 80 mg  -     lidocaine (XYLOCAINE) 1 % injection 2 mL  -     bupivacaine (PF) (MARCAINE) 0.5 % injection 2 mL  -     Arthrocentesis  Steroid knee injection today in office.  2. Decreased GFR  -     Basic metabolic panel; Future  GFR had decreased from the 80s to 56 over the past year or so.  Repeat labs today.  Discussed increasing water intake.  Also discussed the negative effects of NSAIDs on kidney health.  3. Overactive bladder  She has stopped the oxybutynin.  She is currently planning her activities around place that she can use the bathroom.      Follow Up   Return if symptoms worsen or fail to improve.  Patient was given instructions and counseling regarding her condition or for health maintenance advice. Please see specific information pulled into the AVS if appropriate.     Electronically signed by Janette Byrne MD, 12/27/21, 1:37 PM EST.

## 2021-12-28 LAB
BUN SERPL-MCNC: 13 MG/DL (ref 8–23)
BUN/CREAT SERPL: 16.3 (ref 7–25)
CALCIUM SERPL-MCNC: 9.6 MG/DL (ref 8.6–10.5)
CHLORIDE SERPL-SCNC: 105 MMOL/L (ref 98–107)
CO2 SERPL-SCNC: 26.2 MMOL/L (ref 22–29)
CREAT SERPL-MCNC: 0.8 MG/DL (ref 0.57–1)
GLUCOSE SERPL-MCNC: 98 MG/DL (ref 65–99)
POTASSIUM SERPL-SCNC: 4.2 MMOL/L (ref 3.5–5.2)
SODIUM SERPL-SCNC: 139 MMOL/L (ref 136–145)

## 2021-12-29 ENCOUNTER — TELEPHONE (OUTPATIENT)
Dept: FAMILY MEDICINE CLINIC | Facility: CLINIC | Age: 71
End: 2021-12-29

## 2021-12-29 LAB
BACTERIA UR CULT: NORMAL
BACTERIA UR CULT: NORMAL

## 2021-12-29 NOTE — TELEPHONE ENCOUNTER
----- Message from Janette Byrne MD sent at 12/29/2021  8:02 AM EST -----  Urine culture is normal with out bacterial infection.

## 2022-03-07 ENCOUNTER — HOSPITAL ENCOUNTER (OUTPATIENT)
Dept: BONE DENSITY | Facility: HOSPITAL | Age: 72
Discharge: HOME | End: 2022-03-07
Admitting: FAMILY MEDICINE

## 2022-03-07 DIAGNOSIS — M85.88 OTHER SPECIFIED DISORDERS OF BONE DENSITY AND STRUCTURE, OTHER SITE: ICD-10-CM

## 2022-03-07 PROCEDURE — 77080 DXA BONE DENSITY AXIAL: CPT

## 2022-03-08 PROBLEM — M81.0 AGE-RELATED OSTEOPOROSIS WITHOUT CURRENT PATHOLOGICAL FRACTURE: Status: ACTIVE | Noted: 2022-03-07

## 2022-03-09 ENCOUNTER — OFFICE VISIT (OUTPATIENT)
Dept: FAMILY MEDICINE CLINIC | Facility: CLINIC | Age: 72
End: 2022-03-09

## 2022-03-09 VITALS
HEIGHT: 63 IN | OXYGEN SATURATION: 98 % | WEIGHT: 166.7 LBS | HEART RATE: 89 BPM | BODY MASS INDEX: 29.54 KG/M2 | SYSTOLIC BLOOD PRESSURE: 138 MMHG | DIASTOLIC BLOOD PRESSURE: 88 MMHG

## 2022-03-09 DIAGNOSIS — M81.0 AGE-RELATED OSTEOPOROSIS WITHOUT CURRENT PATHOLOGICAL FRACTURE: Primary | ICD-10-CM

## 2022-03-09 PROCEDURE — 99214 OFFICE O/P EST MOD 30 MIN: CPT | Performed by: FAMILY MEDICINE

## 2022-03-09 RX ORDER — ALENDRONATE SODIUM 70 MG/1
70 TABLET ORAL
Qty: 4 TABLET | Refills: 11 | Status: SHIPPED | OUTPATIENT
Start: 2022-03-09 | End: 2022-12-12 | Stop reason: SDUPTHER

## 2022-03-09 NOTE — PATIENT INSTRUCTIONS
[employer] Help Desk [phone] or [phone]        Exercise    Less pain, better mood, and lower risk of many diseases  Some physical activity is better than none. Try to sit less throughout the day  Fitness is free--No equipment needed to walk, run/jog, dance, hike, Isael Chi  150 minutes (2 hours and 30 minutes) to 300 minutes (5 hours) a week of moderate-intensity aerobic physical activity, or 75 minutes (1 hour and 15 minutes) to 150 minutes (2 hours and 30 minutes) a week of vigorous-intensity aerobic physical activity has substantial health benefits  Muscle strengthening exercises 2 days per week  Older adults should incorporate balance training   Bones need pressure to get stronger. Weight bearing exercises include running/jogging, dancing, hiking, elliptical, treadmill, stair climbing, jumping rope, aerobics.   Joint friendly low impact activities include walking, biking, swimming, yoga, Isael Chi, dance. Include range of motion and stretching exercises to improve flexibility.   [email or web address]

## 2022-03-09 NOTE — PROGRESS NOTES
"Chief Complaint  Osteoporosis (Patient is here regarding a followup on her latest bone density exam. )    Subjective          Lanette Simpson presents to [facility]  History of Present Illness     Calcium makes her nausea and increases her appetite. Not always taking multivitamin.  She has lots of movement during the day.  She works part-time and is active caring for her children.    Objective   Vital Signs:   /88   Pulse 89   Ht 160 cm (62.99\")   Wt 75.6 kg (166 lb 11.2 oz)   SpO2 98%   BMI 29.54 kg/m²     Physical Exam  Vitals reviewed.   Constitutional:       General: She is not in acute distress.     Appearance: She is not ill-appearing.   Cardiovascular:      Rate and Rhythm: Normal rate and regular rhythm.   Pulmonary:      Effort: Pulmonary effort is normal.      Breath sounds: Normal breath sounds.   Neurological:      Mental Status: She is alert.   Psychiatric:         Mood and Affect: Mood normal.         Behavior: Behavior normal.         Thought Content: Thought content normal.         Judgment: Judgment normal.        Result Review :                DEXA Bone Density Axial (03/07/2022 11:07)  SCANNED - DEXA (11/12/2014)      Assessment and Plan    Diagnoses and all orders for this visit:    1. Age-related osteoporosis without current pathological fracture (Primary)  -     alendronate (Fosamax) 70 MG tablet; Take 1 tablet by mouth Every 7 (Seven) Days.  Dispense: 4 tablet; Refill: 11    I compared the bone density to the report from 2014 and it shows worsening in all areas of bilateral hips and spine.  Specifically at the left femoral neck it has now worsened to T score -2.9 which is categorized as osteoporosis.  Your previous bone scan showed osteopenia.  I would recommend starting treatment with an oral bisphosphonate medication to help treat osteoporosis.  Calcium and vitamin D through diet and supplementation is also important for bone health.  Regular " physical activity will strengthen your bones.  Discussed oral bisphosphonates as first-line but other options including Prolia and Reclast.  Cautioned on side effects and instructed on how to take the pill.  At this time mutually decided to start Fosamax.  Also discussed ways to improve calcium and vitamin D supplements in her diet and habits.  Plan to repeat bone density in 2 years.  Also discussed if she has a fall with injury would need evaluation given her increased risk of fractures.      Follow Up   Return in about 9 months (around 11/29/2022) for as scheduled.  Patient was given instructions and counseling regarding her condition or for health maintenance advice. Please see specific information pulled into the AVS if appropriate.     Electronically signed by Janette Byrne MD, 03/09/22, 1:56 PM EST.

## 2022-03-28 RX ORDER — OMEPRAZOLE 20 MG/1
CAPSULE, DELAYED RELEASE ORAL
Qty: 90 CAPSULE | Refills: 1 | Status: SHIPPED | OUTPATIENT
Start: 2022-03-28 | End: 2022-10-04

## 2022-05-02 ENCOUNTER — LAB (OUTPATIENT)
Dept: LAB | Facility: HOSPITAL | Age: 72
End: 2022-05-02

## 2022-05-02 ENCOUNTER — TELEPHONE (OUTPATIENT)
Dept: ONCOLOGY | Facility: CLINIC | Age: 72
End: 2022-05-02

## 2022-05-02 DIAGNOSIS — C83.31 DIFFUSE LARGE B-CELL LYMPHOMA OF LYMPH NODES OF HEAD: ICD-10-CM

## 2022-05-02 LAB
ALBUMIN SERPL-MCNC: 4.2 G/DL (ref 3.5–5.2)
ALBUMIN/GLOB SERPL: 1.4 G/DL
ALP SERPL-CCNC: 64 U/L (ref 39–117)
ALT SERPL W P-5'-P-CCNC: 19 U/L (ref 1–33)
ANION GAP SERPL CALCULATED.3IONS-SCNC: 9 MMOL/L (ref 5–15)
AST SERPL-CCNC: 46 U/L (ref 1–32)
BASOPHILS # BLD AUTO: 0.12 10*3/MM3 (ref 0–0.2)
BASOPHILS NFR BLD AUTO: 1.4 % (ref 0–1.5)
BILIRUB SERPL-MCNC: 0.4 MG/DL (ref 0–1.2)
BUN SERPL-MCNC: 15 MG/DL (ref 8–23)
BUN/CREAT SERPL: 16.9 (ref 7–25)
CALCIUM SPEC-SCNC: 9.3 MG/DL (ref 8.6–10.5)
CHLORIDE SERPL-SCNC: 104 MMOL/L (ref 98–107)
CO2 SERPL-SCNC: 27 MMOL/L (ref 22–29)
CREAT SERPL-MCNC: 0.89 MG/DL (ref 0.57–1)
DEPRECATED RDW RBC AUTO: 49.5 FL (ref 37–54)
EGFRCR SERPLBLD CKD-EPI 2021: 69 ML/MIN/1.73
EOSINOPHIL # BLD AUTO: 0.25 10*3/MM3 (ref 0–0.4)
EOSINOPHIL NFR BLD AUTO: 3 % (ref 0.3–6.2)
ERYTHROCYTE [DISTWIDTH] IN BLOOD BY AUTOMATED COUNT: 14.7 % (ref 12.3–15.4)
GLOBULIN UR ELPH-MCNC: 2.9 GM/DL
GLUCOSE SERPL-MCNC: 108 MG/DL (ref 65–99)
HCT VFR BLD AUTO: 42.5 % (ref 34–46.6)
HGB BLD-MCNC: 14.4 G/DL (ref 12–15.9)
IMM GRANULOCYTES # BLD AUTO: 0.03 10*3/MM3 (ref 0–0.05)
IMM GRANULOCYTES NFR BLD AUTO: 0.4 % (ref 0–0.5)
LYMPHOCYTES # BLD AUTO: 2.08 10*3/MM3 (ref 0.7–3.1)
LYMPHOCYTES NFR BLD AUTO: 24.6 % (ref 19.6–45.3)
MCH RBC QN AUTO: 31.3 PG (ref 26.6–33)
MCHC RBC AUTO-ENTMCNC: 33.9 G/DL (ref 31.5–35.7)
MCV RBC AUTO: 92.4 FL (ref 79–97)
MONOCYTES # BLD AUTO: 0.72 10*3/MM3 (ref 0.1–0.9)
MONOCYTES NFR BLD AUTO: 8.5 % (ref 5–12)
NEUTROPHILS NFR BLD AUTO: 5.26 10*3/MM3 (ref 1.7–7)
NEUTROPHILS NFR BLD AUTO: 62.1 % (ref 42.7–76)
NRBC BLD AUTO-RTO: 0 /100 WBC (ref 0–0.2)
PLATELET # BLD AUTO: 453 10*3/MM3 (ref 140–450)
PMV BLD AUTO: 10 FL (ref 6–12)
POTASSIUM SERPL-SCNC: 4.9 MMOL/L (ref 3.5–5.2)
PROT SERPL-MCNC: 7.1 G/DL (ref 6–8.5)
RBC # BLD AUTO: 4.6 10*6/MM3 (ref 3.77–5.28)
SODIUM SERPL-SCNC: 140 MMOL/L (ref 136–145)
WBC NRBC COR # BLD: 8.46 10*3/MM3 (ref 3.4–10.8)

## 2022-05-02 PROCEDURE — 80053 COMPREHEN METABOLIC PANEL: CPT

## 2022-05-02 PROCEDURE — 85025 COMPLETE CBC W/AUTO DIFF WBC: CPT

## 2022-05-02 PROCEDURE — 36415 COLL VENOUS BLD VENIPUNCTURE: CPT

## 2022-05-02 NOTE — TELEPHONE ENCOUNTER
Spoke with patient, she is stopping at [facility] today to get labs and I will have fax the results to [facility].

## 2022-05-02 NOTE — TELEPHONE ENCOUNTER
Caller: Lanette Simpson    Relationship: Self    Best call back number: [phone] CAN CALL ANYTIME AND PLEASE LEAVE DETAILED VM IF UNABLE TO REACH PATIENT.    What orders are you requesting (i.e. lab or imaging): LAB ORDERS    In what timeframe would the patient need to come in: APPT IS SCHEDULED FOR 05/05/2022.    Where will you receive your lab/imaging services: [facility]   [address] [place] [address] [address], [place] [address]  [phone]      Additional notes: PATIENT WAS INFORMED BY [facility] THAT SHE NEEDED TO HAVE LABS DONE PRIOR TO HER CT APPT.  ONCE ADDRESSED AND DETERMINATION IS MADE PLEASE CONTACT PATIENT BACK ASAP DUE TO SHE HAS AN APPT SCHEDULED WITH EVIE RODRIGUEZ ON 05/05/2022.          DJC/DAVE

## 2022-05-10 ENCOUNTER — OFFICE VISIT (OUTPATIENT)
Dept: ONCOLOGY | Facility: CLINIC | Age: 72
End: 2022-05-10

## 2022-05-10 VITALS
SYSTOLIC BLOOD PRESSURE: 133 MMHG | BODY MASS INDEX: 29.23 KG/M2 | TEMPERATURE: 96.4 F | HEIGHT: 63 IN | OXYGEN SATURATION: 97 % | DIASTOLIC BLOOD PRESSURE: 75 MMHG | RESPIRATION RATE: 16 BRPM | HEART RATE: 80 BPM | WEIGHT: 165 LBS

## 2022-05-10 DIAGNOSIS — C83.31 DIFFUSE LARGE B-CELL LYMPHOMA OF LYMPH NODES OF HEAD: Primary | ICD-10-CM

## 2022-05-10 PROCEDURE — 99214 OFFICE O/P EST MOD 30 MIN: CPT | Performed by: INTERNAL MEDICINE

## 2022-05-10 NOTE — PROGRESS NOTES
DATE OF VISIT: 5/10/2022    REASON FOR VISIT: Followup for mandible diffuse large B-cell lymphoma     PROBLEM LIST:  1. Mandible diffuse large B-cell lymphoma, stage IIE:  A.  Presented with gum pain and swelling  B.  CT facial bones done on February 26, 2019 revealed 2.3 cm lucency in the anterior mandible  C.  Status post biopsy done on March 7, 2019 consistent with diffuse large B-cell lymphoma  D.  Bone marrow biopsy done on March 25, 2019 no evidence of lymphoma involvement.  E.  Whole body PET scan done on March 28, 2019 revealed hypermetabolic activity in the right curtis-mandible as well as jugular cervical chains hypermetabolic lymphadenopathy level 2 and level 3.  F. Started chemotherapy with R-CHOP April 2019,status post 6 cycles, completed July 22, 2019.  2.  Osteoarthritis  3.  Active bladder  4.  Heartburn    HISTORY OF PRESENT ILLNESS: The patient is a very pleasant 72 y.o. 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  Staging workup revealed stage IIE disease.  Patient was started on definitive chemotherapy with R CHOP April 8, 2019.  The patient completed cycle #6 June 22, 2019.  The patient is here today for scheduled follow-up visit.    SUBJECTIVE: The patient is here today by herself.  She denies any shortness of breath no problems swallowing.  She is anxious about the scan results    Past History:  Medical History: has a past medical history of Age-related osteoporosis without current pathological fracture (03/07/2022), Arthritis, Colon polyp (12/05/2013), Colon, diverticulosis, Degenerative arthritis of spine, Diffuse large B cell lymphoma (HCC) (2019), Nonalcoholic hepatosteatosis, OAB (overactive bladder), Osteopenia, and Wears glasses.   Surgical History: has a past surgical history that includes Total abdominal hysterectomy; Bone marrow biopsy; Bone biopsy; Colonoscopy (2013); and Venous Access Device (Port) (N/A, 4/4/2019).    Family History: family history includes Breast cancer in her maternal grandmother and paternal grandmother; Diabetes in her brother, brother, and father; Glaucoma in her paternal grandfather; Hypertension in her brother, brother, mother, and sister; Pancreatic cancer in her mother; Prostate cancer in her father; Stroke in her maternal grandfather.   Social History: reports that she has never smoked. She has never used smokeless tobacco. She reports that she does not drink alcohol and does not use drugs.    (Not in a hospital admission)     Allergies: Patient has no known allergies.    Review of Systems   Constitutional: Negative for activity change, appetite change, chills, fatigue, fever and unexpected weight change.   HENT: Negative for hearing loss, mouth sores, nosebleeds, sore throat and trouble swallowing.    Eyes: Negative for visual disturbance.   Respiratory: Negative for cough, chest tightness, shortness of breath and wheezing.    Cardiovascular: Negative for chest pain, palpitations and leg swelling.   Gastrointestinal: Negative for abdominal distention, abdominal pain, blood in stool, constipation, diarrhea, nausea, rectal pain and vomiting.   Endocrine: Negative for cold intolerance and heat intolerance.   Genitourinary: Positive for frequency. Negative for difficulty urinating, dysuria and urgency.        Overactive bladder   Musculoskeletal: Positive for arthralgias. Negative for back pain, gait problem, joint swelling and myalgias.   Skin: Negative for rash.   Neurological: Negative for dizziness, tremors, syncope, weakness, light-headedness, numbness and headaches.   Hematological: Negative for adenopathy. Does not bruise/bleed easily.   Psychiatric/Behavioral: Negative for confusion, sleep disturbance and suicidal ideas. The patient is not nervous/anxious.          Current Outpatient Medications:   •  alendronate (Fosamax) 70 MG tablet, Take 1 tablet by mouth Every 7 (Seven) Days., Disp: 4  "tablet, Rfl: 11  •  CALCIUM PO, Take 600 mg by mouth Daily., Disp: , Rfl:   •  furosemide (LASIX) 20 MG tablet, Take 1 tablet by mouth As Needed (fluid retention). For fluid retention, Disp: 10 tablet, Rfl: 0  •  Multiple Vitamins-Minerals (MULTIVITAMIN ADULT PO), Take 1 tablet by mouth Daily., Disp: , Rfl:   •  omeprazole (priLOSEC) 20 MG capsule, TAKE ONE CAPSULE BY MOUTH EVERY NIGHT AT BEDTIME, Disp: 90 capsule, Rfl: 1  •  traZODone (DESYREL) 50 MG tablet, Take 0.5-2 tablets by mouth At Night As Needed for Sleep., Disp: 60 tablet, Rfl: 11    PHYSICAL EXAMINATION:   /75   Pulse 80   Temp 96.4 °F (35.8 °C) (Temporal)   Resp 16   Ht 160 cm (62.99\")   Wt 74.8 kg (165 lb)   SpO2 97%   BMI 29.24 kg/m²    Pain Score    05/10/22 1111   PainSc: 0-No pain        ECOG Performance Status: 1 - Symptomatic but completely ambulatory  General Appearance:  alert, cooperative, no apparent distress and appears stated age   Neurologic/Psychiatric: A&O x 3, gait steady, appropriate affect, strength 5/5 in all muscle groups   HEENT:  Normocephalic, without obvious abnormality, mucous membranes moist   Neck: Supple, symmetrical, trachea midline, no adenopathy;  No thyromegaly, masses, or tenderness   Lungs:   Clear to auscultation bilaterally; respirations regular, even, and unlabored bilaterally   Heart:  Regular rate and rhythm, no murmurs appreciated   Abdomen:   Soft, non-tender, non-distended and no organomegaly   Lymph nodes: No cervical, supraclavicular, inguinal or axillary adenopathy noted   Extremities: Normal, atraumatic; no clubbing, cyanosis, or edema    Skin: No rashes, ulcers, or suspicious lesions noted     Lab on 05/02/2022   Component Date Value Ref Range Status   • Glucose 05/02/2022 108 (A) 65 - 99 mg/dL Final   • BUN 05/02/2022 15  8 - 23 mg/dL Final   • Creatinine 05/02/2022 0.89  0.57 - 1.00 mg/dL Final   • Sodium 05/02/2022 140  136 - 145 mmol/L Final   • Potassium 05/02/2022 4.9  3.5 - 5.2 mmol/L " Final   • Chloride 05/02/2022 104  98 - 107 mmol/L Final   • CO2 05/02/2022 27.0  22.0 - 29.0 mmol/L Final   • Calcium 05/02/2022 9.3  8.6 - 10.5 mg/dL Final   • Total Protein 05/02/2022 7.1  6.0 - 8.5 g/dL Final   • Albumin 05/02/2022 4.20  3.50 - 5.20 g/dL Final   • ALT (SGPT) 05/02/2022 19  1 - 33 U/L Final   • AST (SGOT) 05/02/2022 46 (A) 1 - 32 U/L Final   • Alkaline Phosphatase 05/02/2022 64  39 - 117 U/L Final   • Total Bilirubin 05/02/2022 0.4  0.0 - 1.2 mg/dL Final   • Globulin 05/02/2022 2.9  gm/dL Final    Calculated Result   • A/G Ratio 05/02/2022 1.4  g/dL Final   • BUN/Creatinine Ratio 05/02/2022 16.9  7.0 - 25.0 Final   • Anion Gap 05/02/2022 9.0  5.0 - 15.0 mmol/L Final   • eGFR 05/02/2022 69.0  >60.0 mL/min/1.73 Final    National Kidney Foundation and American Society of Nephrology (ASN) Task Force recommended calculation based on the Chronic Kidney Disease Epidemiology Collaboration (CKD-EPI) equation refit without adjustment for race.   • WBC 05/02/2022 8.46  3.40 - 10.80 10*3/mm3 Final   • RBC 05/02/2022 4.60  3.77 - 5.28 10*6/mm3 Final   • Hemoglobin 05/02/2022 14.4  12.0 - 15.9 g/dL Final   • Hematocrit 05/02/2022 42.5  34.0 - 46.6 % Final   • MCV 05/02/2022 92.4  79.0 - 97.0 fL Final   • MCH 05/02/2022 31.3  26.6 - 33.0 pg Final   • MCHC 05/02/2022 33.9  31.5 - 35.7 g/dL Final   • RDW 05/02/2022 14.7  12.3 - 15.4 % Final   • RDW-SD 05/02/2022 49.5  37.0 - 54.0 fl Final   • MPV 05/02/2022 10.0  6.0 - 12.0 fL Final   • Platelets 05/02/2022 453 (A) 140 - 450 10*3/mm3 Final   • Neutrophil % 05/02/2022 62.1  42.7 - 76.0 % Final   • Lymphocyte % 05/02/2022 24.6  19.6 - 45.3 % Final   • Monocyte % 05/02/2022 8.5  5.0 - 12.0 % Final   • Eosinophil % 05/02/2022 3.0  0.3 - 6.2 % Final   • Basophil % 05/02/2022 1.4  0.0 - 1.5 % Final   • Immature Grans % 05/02/2022 0.4  0.0 - 0.5 % Final   • Neutrophils, Absolute 05/02/2022 5.26  1.70 - 7.00 10*3/mm3 Final   • Lymphocytes, Absolute 05/02/2022 2.08  0.70  - 3.10 10*3/mm3 Final   • Monocytes, Absolute 05/02/2022 0.72  0.10 - 0.90 10*3/mm3 Final   • Eosinophils, Absolute 05/02/2022 0.25  0.00 - 0.40 10*3/mm3 Final   • Basophils, Absolute 05/02/2022 0.12  0.00 - 0.20 10*3/mm3 Final   • Immature Grans, Absolute 05/02/2022 0.03  0.00 - 0.05 10*3/mm3 Final   • nRBC 05/02/2022 0.0  0.0 - 0.2 /100 WBC Final        No results found.    ASSESSMENT: The patient is a very pleasant 72 y.o. female  with mandible diffuse large B-cell lymphoma.    PLAN:  1.  Diffuse large B-cell lymphoma:  A.  I did go over the CT scan results with the patient from May 5, 2022.  I reassured the patient no evidence of relapsed lymphoma.  B.  I will continue watchful waiting.  The patient will follow up with us in 6 months with repeated scans if her lung infiltrate is stable I will switch her back to annual visits at that point.  C.  I did go over the blood work results with the patient from May 2, 2022.  She did have mildly elevated platelets 453 and AST slightly at 46, otherwise her CBC and CMP looked essentially normal.    2.  Heartburn:  A.  She will continue omeprazole 20 mg daily for acid reflux.     3.  Osteoporosis:  A.  The patient will continue calcium with vitamin D as well as Fosamax weekly.    FOLLOW UP: 6 months with labs and scans.     Joan Zuluaga MD  5/10/2022

## 2022-07-27 ENCOUNTER — TELEPHONE (OUTPATIENT)
Dept: FAMILY MEDICINE CLINIC | Facility: CLINIC | Age: 72
End: 2022-07-27

## 2022-07-27 NOTE — TELEPHONE ENCOUNTER
Caller: Lanette Simpson    Relationship: Self    Best call back number: [phone]    What medication are you requesting: ANTIBIOTIC    What are your current symptoms:FREQUENT URINATION, BURNING, ITCHING    How long have you been experiencing symptoms: A FEW DAYS    Have you had these symptoms before:    [x] Yes  [] No    Have you been treated for these symptoms before:   [x] Yes  [] No    If a prescription is needed, what is your preferred pharmacy and phone number[place] [facility] [address] [facility] [phone] [facility] [phone]      Additional notes: PATIENT STATES THAT SHE IS UNABLE TO COME IN BECAUSE SHE HAS TO TAKE CARE OF HER  IN A WHEEL CHAIR

## 2022-07-27 NOTE — TELEPHONE ENCOUNTER
For urinary symptoms I need an office visit to also perform urinary testing to determine if an antibiotic is needed.  If she is not able to, during office hours I would recommend a [facility] urgent care for evaluation.

## 2022-07-29 ENCOUNTER — OFFICE VISIT (OUTPATIENT)
Dept: FAMILY MEDICINE CLINIC | Facility: CLINIC | Age: 72
End: 2022-07-29

## 2022-07-29 VITALS
HEIGHT: 63 IN | WEIGHT: 165 LBS | SYSTOLIC BLOOD PRESSURE: 120 MMHG | DIASTOLIC BLOOD PRESSURE: 84 MMHG | HEART RATE: 96 BPM | OXYGEN SATURATION: 98 % | TEMPERATURE: 98 F | BODY MASS INDEX: 29.23 KG/M2

## 2022-07-29 DIAGNOSIS — R30.0 DYSURIA: Primary | ICD-10-CM

## 2022-07-29 DIAGNOSIS — R01.1 MURMUR, HEART: ICD-10-CM

## 2022-07-29 DIAGNOSIS — R35.0 FREQUENCY OF URINATION: ICD-10-CM

## 2022-07-29 LAB
BILIRUB BLD-MCNC: NEGATIVE MG/DL
CLARITY, POC: ABNORMAL
COLOR UR: YELLOW
EXPIRATION DATE: ABNORMAL
GLUCOSE UR STRIP-MCNC: NEGATIVE MG/DL
KETONES UR QL: NEGATIVE
LEUKOCYTE EST, POC: NEGATIVE
Lab: ABNORMAL
NITRITE UR-MCNC: NEGATIVE MG/ML
PH UR: 6 [PH] (ref 5–8)
PROT UR STRIP-MCNC: NEGATIVE MG/DL
RBC # UR STRIP: ABNORMAL /UL
SP GR UR: 1.01 (ref 1–1.03)
UROBILINOGEN UR QL: NORMAL

## 2022-07-29 PROCEDURE — 87088 URINE BACTERIA CULTURE: CPT | Performed by: NURSE PRACTITIONER

## 2022-07-29 PROCEDURE — 93000 ELECTROCARDIOGRAM COMPLETE: CPT | Performed by: NURSE PRACTITIONER

## 2022-07-29 PROCEDURE — 87186 SC STD MICRODIL/AGAR DIL: CPT | Performed by: NURSE PRACTITIONER

## 2022-07-29 PROCEDURE — 87086 URINE CULTURE/COLONY COUNT: CPT | Performed by: NURSE PRACTITIONER

## 2022-07-29 PROCEDURE — 99213 OFFICE O/P EST LOW 20 MIN: CPT | Performed by: NURSE PRACTITIONER

## 2022-07-29 PROCEDURE — 81003 URINALYSIS AUTO W/O SCOPE: CPT | Performed by: NURSE PRACTITIONER

## 2022-07-29 NOTE — PROGRESS NOTES
"  Answers for HPI/ROS submitted by the patient on 7/29/2022  What is the primary reason for your visit?: Painful Urination    Chief Complaint  Difficulty Urinating (Pt states she's had some burning and pain when urinating on and off since Monday. ) and Rapid Heart Rate (Pt states this has been going for about a few months on and off. )    Subjective        Lanette Simpson presents to [facility]  Pt is here today with urinary symptoms. She states symptoms started about 5 days ago. She has had burning with urination. She increased water intake, but symptoms are not improving. She does have a history of uti's. She states symptoms have been better since yesterday.     Pt is also concerned about episodes of elevated heart rate. She states when she has these episodes, she experiences shortness of breath. There is no pain with episodes. She states it seems worse when she eats sugar. She also states she is under a lot of stress. She has noticed increased episodes with stressful events.   We discussed referral to [facility] to investigate further.      Dysuria   This is a recurrent problem. The current episode started in the past 7 days. The problem occurs every urination. The problem has been waxing and waning. The quality of the pain is described as burning. The pain is at a severity of 4/10. There has been no fever. She is not sexually active. There is no history of pyelonephritis. Associated symptoms include frequency and urgency. Pertinent negatives include no chills, discharge, flank pain, hematuria, hesitancy, nausea, possible pregnancy, sweats or vomiting.       Objective   Vital Signs:  /84   Pulse 96   Temp 98 °F (36.7 °C)   Ht 160 cm (62.99\")   Wt 74.8 kg (165 lb)   SpO2 98%   BMI 29.24 kg/m²   Estimated body mass index is 29.24 kg/m² as calculated from the following:    Height as of this encounter: 160 cm (62.99\").    Weight as of this encounter: 74.8 kg (165 " lb).          Physical Exam  Vitals reviewed.   Constitutional:       Appearance: Normal appearance.   HENT:      Nose: Nose normal.      Mouth/Throat:      Mouth: Mucous membranes are moist.   Cardiovascular:      Rate and Rhythm: Normal rate and regular rhythm.      Heart sounds: Murmur heard.   Pulmonary:      Effort: Pulmonary effort is normal.      Breath sounds: Normal breath sounds.   Musculoskeletal:         General: Normal range of motion.   Skin:     General: Skin is warm.   Neurological:      Mental Status: She is alert and oriented to person, place, and time.   Psychiatric:         Mood and Affect: Mood normal.         Behavior: Behavior normal.         Thought Content: Thought content normal.        Result Review :           ECG 12 Lead    Date/Time: 8/24/2022 1:13 PM  Performed by: Serena Bergeron APRN  Authorized by: Serena Bergeron APRN   Comparison: not compared with previous ECG   Rhythm: sinus rhythm  Rate: normal  Conduction: conduction normal  QRS axis: normal  Comments: Borderline ECG. Possible left Atrial Enlargement.              Assessment and Plan   Diagnoses and all orders for this visit:    1. Murmur, heart (Primary)  -     Ambulatory Referral to [facility]    2. Dysuria  -     Urine Culture - Urine, Urine, Clean Catch; Future  -     Urine Culture - Urine, Urine, Clean Catch    3. Frequency of urination  -     POCT urinalysis dipstick, automated             Follow Up   No follow-ups on file.  Patient was given instructions and counseling regarding her condition or for health maintenance advice. Please see specific information pulled into the AVS if appropriate.

## 2022-07-31 LAB — BACTERIA SPEC AEROBE CULT: ABNORMAL

## 2022-08-01 RX ORDER — NITROFURANTOIN 25; 75 MG/1; MG/1
100 CAPSULE ORAL EVERY 12 HOURS SCHEDULED
Qty: 10 CAPSULE | Refills: 0 | Status: SHIPPED | OUTPATIENT
Start: 2022-08-01 | End: 2022-08-06

## 2022-08-02 ENCOUNTER — OFFICE VISIT (OUTPATIENT)
Dept: CARDIOLOGY | Facility: HOSPITAL | Age: 72
End: 2022-08-02

## 2022-08-02 ENCOUNTER — HOSPITAL ENCOUNTER (OUTPATIENT)
Dept: CARDIOLOGY | Facility: HOSPITAL | Age: 72
Discharge: HOME | End: 2022-08-02

## 2022-08-02 VITALS
OXYGEN SATURATION: 98 % | TEMPERATURE: 97.5 F | HEIGHT: 63 IN | HEART RATE: 94 BPM | SYSTOLIC BLOOD PRESSURE: 130 MMHG | DIASTOLIC BLOOD PRESSURE: 56 MMHG | BODY MASS INDEX: 29.12 KG/M2 | RESPIRATION RATE: 16 BRPM | WEIGHT: 164.38 LBS

## 2022-08-02 DIAGNOSIS — R01.1 CARDIAC MURMUR: Primary | ICD-10-CM

## 2022-08-02 DIAGNOSIS — R00.2 PALPITATIONS: ICD-10-CM

## 2022-08-02 PROCEDURE — 93246 EXT ECG>7D<15D RECORDING: CPT

## 2022-08-02 PROCEDURE — 99214 OFFICE O/P EST MOD 30 MIN: CPT | Performed by: NURSE PRACTITIONER

## 2022-08-05 ENCOUNTER — HOSPITAL ENCOUNTER (OUTPATIENT)
Dept: CARDIOLOGY | Facility: HOSPITAL | Age: 72
Discharge: HOME | End: 2022-08-05
Admitting: NURSE PRACTITIONER

## 2022-08-05 VITALS
WEIGHT: 164.46 LBS | SYSTOLIC BLOOD PRESSURE: 143 MMHG | BODY MASS INDEX: 29.14 KG/M2 | HEIGHT: 63 IN | DIASTOLIC BLOOD PRESSURE: 72 MMHG

## 2022-08-05 DIAGNOSIS — R00.2 PALPITATIONS: ICD-10-CM

## 2022-08-05 DIAGNOSIS — R01.1 CARDIAC MURMUR: ICD-10-CM

## 2022-08-05 LAB
BH CV ECHO MEAS - AI P1/2T: 298.9 MSEC
BH CV ECHO MEAS - AO MAX PG: 21 MMHG
BH CV ECHO MEAS - AO MEAN PG: 11.8 MMHG
BH CV ECHO MEAS - AO ROOT DIAM: 3.2 CM
BH CV ECHO MEAS - AO V2 MAX: 228.2 CM/SEC
BH CV ECHO MEAS - AO V2 VTI: 54.2 CM
BH CV ECHO MEAS - AVA(I,D): 1.35 CM2
BH CV ECHO MEAS - EDV(CUBED): 72.6 ML
BH CV ECHO MEAS - EDV(MOD-SP2): 82 ML
BH CV ECHO MEAS - EDV(MOD-SP4): 81.5 ML
BH CV ECHO MEAS - EF(MOD-BP): 60 %
BH CV ECHO MEAS - EF(MOD-SP2): 57.7 %
BH CV ECHO MEAS - EF(MOD-SP4): 59.4 %
BH CV ECHO MEAS - ESV(CUBED): 29.2 ML
BH CV ECHO MEAS - ESV(MOD-SP2): 34.7 ML
BH CV ECHO MEAS - ESV(MOD-SP4): 33.1 ML
BH CV ECHO MEAS - FS: 26.2 %
BH CV ECHO MEAS - IVS/LVPW: 0.94 CM
BH CV ECHO MEAS - IVSD: 1.36 CM
BH CV ECHO MEAS - LA DIMENSION: 3.3 CM
BH CV ECHO MEAS - LAT PEAK E' VEL: 7.9 CM/SEC
BH CV ECHO MEAS - LV MASS(C)D: 221.3 GRAMS
BH CV ECHO MEAS - LV MAX PG: 2.8 MMHG
BH CV ECHO MEAS - LV MEAN PG: 1.37 MMHG
BH CV ECHO MEAS - LV V1 MAX: 84.2 CM/SEC
BH CV ECHO MEAS - LV V1 VTI: 18 CM
BH CV ECHO MEAS - LVIDD: 4.2 CM
BH CV ECHO MEAS - LVIDS: 3.1 CM
BH CV ECHO MEAS - LVOT AREA: 4.1 CM2
BH CV ECHO MEAS - LVOT DIAM: 2.28 CM
BH CV ECHO MEAS - LVPWD: 1.44 CM
BH CV ECHO MEAS - MED PEAK E' VEL: 4.9 CM/SEC
BH CV ECHO MEAS - MR MAX PG: 104.2 MMHG
BH CV ECHO MEAS - MR MAX VEL: 510.4 CM/SEC
BH CV ECHO MEAS - MR MEAN PG: 61.5 MMHG
BH CV ECHO MEAS - MR MEAN VEL: 370.1 CM/SEC
BH CV ECHO MEAS - MR VTI: 182.4 CM
BH CV ECHO MEAS - MV A MAX VEL: 102 CM/SEC
BH CV ECHO MEAS - MV DEC SLOPE: 324 CM/SEC2
BH CV ECHO MEAS - MV DEC TIME: 0.23 MSEC
BH CV ECHO MEAS - MV E MAX VEL: 59.6 CM/SEC
BH CV ECHO MEAS - MV E/A: 0.58
BH CV ECHO MEAS - MV P1/2T: 71.7 MSEC
BH CV ECHO MEAS - MVA(P1/2T): 3.1 CM2
BH CV ECHO MEAS - PA ACC TIME: 0.15 SEC
BH CV ECHO MEAS - PA PR(ACCEL): 12.5 MMHG
BH CV ECHO MEAS - PA V2 MAX: 149.3 CM/SEC
BH CV ECHO MEAS - PI END-D VEL: 101.8 CM/SEC
BH CV ECHO MEAS - RAP SYSTOLE: 3 MMHG
BH CV ECHO MEAS - RVSP: 31 MMHG
BH CV ECHO MEAS - SV(LVOT): 73.4 ML
BH CV ECHO MEAS - SV(MOD-SP2): 47.3 ML
BH CV ECHO MEAS - SV(MOD-SP4): 48.4 ML
BH CV ECHO MEAS - TAPSE (>1.6): 2.36 CM
BH CV ECHO MEAS - TR MAX PG: 21 MMHG
BH CV ECHO MEAS - TR MAX VEL: 226.9 CM/SEC
BH CV ECHO MEASUREMENTS AVERAGE E/E' RATIO: 9.31
BH CV XLRA - RV BASE: 3.4 CM
BH CV XLRA - RV LENGTH: 5.9 CM
BH CV XLRA - RV MID: 3 CM
BH CV XLRA - TDI S': 10.4 CM/SEC
LEFT ATRIUM VOLUME INDEX: 29 ML/M2
MAXIMAL PREDICTED HEART RATE: 147 BPM
STRESS TARGET HR: 125 BPM

## 2022-08-05 PROCEDURE — 93306 TTE W/DOPPLER COMPLETE: CPT | Performed by: INTERNAL MEDICINE

## 2022-08-05 PROCEDURE — 93306 TTE W/DOPPLER COMPLETE: CPT

## 2022-08-08 NOTE — PROGRESS NOTES
"Chief Complaint  Heart Murmur and Establish Care    Subjective    History of Present Illness {CC  Problem List  Visit  Diagnosis   Encounters  Notes  Medications  Labs  Result Review Imaging  Media :23}       History of Present Illness   72-year-old female presents the office today at the request of her PCP for ongoing evaluation of her palpitations and heart murmur.  She reports the palpitations occur intermittently and she describes them as a skipping sensation.Patient also reports that she feels like her heart rate is elevated and that happens when she eats increased sugar.  She does have associated dyspnea on exertion when she has these elevated heart rates/palpitations.  Denies chest pain, dizziness, presyncope or syncope.  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  Physical Exam  Vitals and nursing note reviewed.   Constitutional:       Appearance: Normal appearance.   HENT:      Head: Normocephalic.   Eyes:      Pupils: Pupils are equal, round, and reactive to light.   Cardiovascular:      Rate and Rhythm: Normal rate and regular rhythm.      Pulses: Normal pulses.      Heart sounds: Murmur (faint ) heard.   Pulmonary:      Effort: Pulmonary effort is normal.      Breath sounds: Normal breath sounds.   Abdominal:      General: Bowel sounds are normal.      Palpations: Abdomen is soft. "   Musculoskeletal:         General: Normal range of motion.      Cervical back: Normal range of motion.      Right lower leg: No edema.      Left lower leg: No edema.   Skin:     General: Skin is warm and dry.      Capillary Refill: Capillary refill takes less than 2 seconds.   Neurological:      Mental Status: She is alert and oriented to person, place, and time.   Psychiatric:         Mood and Affect: Mood normal.         Thought Content: Thought content normal.              Result Review  Data Reviewed:{ Labs  Result Review  Imaging  Med Tab  Media :23}   EKG 7/29/2022: Sinus rhythm at 92 bpm with possible left atrial enlargement, borderline left axis deviation  CBC & Differential (05/10/2022 12:21)  Comprehensive Metabolic Panel (05/10/2022 12:21)           Assessment and Plan {CC Problem List  Visit Diagnosis  ROS  Review (Popup)  Health Maintenance  Quality  BestPractice  Medications  SmartSets  SnapShot Encounters  Media :23}   1. Cardiac murmur    - Adult Transthoracic Echo Complete W/ Cont if Necessary Per Protocol; Future    2. Palpitations    - Adult Transthoracic Echo Complete W/ Cont if Necessary Per Protocol; Future  - Holter Monitor - 72 Hour Up To 15 Days; Future        Follow Up {Instructions Charge Capture  Follow-up Communications :23}   Return in about 4 weeks (around 8/30/2022) for Telemedicine visit, Monitor results.    Patient was given instructions and counseling regarding her condition or for health maintenance advice. Please see specific information pulled into the AVS if appropriate.  Patient was instructed to call the [facility] with any questions, concerns, or worsening symptoms.

## 2022-09-01 ENCOUNTER — TELEMEDICINE (OUTPATIENT)
Dept: CARDIOLOGY | Facility: HOSPITAL | Age: 72
End: 2022-09-01

## 2022-09-01 VITALS — HEIGHT: 63 IN | WEIGHT: 164 LBS | BODY MASS INDEX: 29.06 KG/M2

## 2022-09-01 DIAGNOSIS — I34.0 MITRAL VALVE INSUFFICIENCY, UNSPECIFIED ETIOLOGY: ICD-10-CM

## 2022-09-01 DIAGNOSIS — I35.1 AORTIC VALVE INSUFFICIENCY, ETIOLOGY OF CARDIAC VALVE DISEASE UNSPECIFIED: ICD-10-CM

## 2022-09-01 DIAGNOSIS — R01.1 CARDIAC MURMUR: ICD-10-CM

## 2022-09-01 DIAGNOSIS — R00.2 PALPITATIONS: Primary | ICD-10-CM

## 2022-09-01 PROCEDURE — 99214 OFFICE O/P EST MOD 30 MIN: CPT | Performed by: NURSE PRACTITIONER

## 2022-09-01 NOTE — PROGRESS NOTES
"Chief Complaint  Follow-up (Palpitations )    Subjective    History of Present Illness {CC  Problem List  Visit  Diagnosis   Encounters  Notes  Medications  Labs  Result Review Imaging  Media :23}     You have chosen to receive care through the use of telemedicine. Telemedicine enables health care providers at different locations to provide safe, effective, and convenient care through the use of technology. As with any health care service, there are risks associated with the use of telemedicine, including equipment failure, poor connections, and  issues.    • Do you understand the risks and benefits of telemedicine as I have explained them to you? Yes  • Have your questions regarding telemedicine been answered? Yes  • Do you consent to the use of telemedicine in your medical care today? Yes    History of Present Illness   72 year old female with telehealth visit today for ongoing evaluation of her palpitations and cardiac murmur. Recently wore an extended holter for palpitations. She notes that palpitations are occurring on an infrequent basis. She notes that her  had been very ill but is doing better and she believes that is why her palpitations have improved. Notes that she feels well overall and denies chest pain, dyspnea, dizziness, presyncope, syncope, orthopnea or pedal edema. Notes bp has been well controlled.   Objective     Vital Signs:   Vitals:    09/01/22 1245   Weight: 74.4 kg (164 lb)   Height: 160 cm (62.99\")     Body mass index is 29.06 kg/m².  Physical Exam  Vitals and nursing note reviewed.   Constitutional:       Appearance: Normal appearance.   HENT:      Head: Normocephalic.   Pulmonary:      Effort: Pulmonary effort is normal.   Neurological:      Mental Status: She is alert and oriented to person, place, and time.   Psychiatric:         Mood and Affect: Mood normal.         Behavior: Behavior normal.         Thought Content: Thought content normal.      "         Result Review  Data Reviewed:{ Labs  Result Review  Imaging  Med Tab  Media :23}   holter monitor: avg hr 85 pac burden 0.03%, PVC burden 0.09%  6 brief runs of atrial tachycardia, patient asymptomatic  One run of NSVT lasting 6 beats   Echo: August 2022:  · Left ventricular ejection fraction appears to be 61 - 65%. Left ventricular systolic function is normal.  · Left ventricular wall thickness is consistent with mild concentric hypertrophy.  · Mild aortic valve stenosis is present.  · Moderate aortic valve regurgitation is present.  · Estimated right ventricular systolic pressure from tricuspid regurgitation is normal (<35 mmHg). Calculated right ventricular systolic pressure from tricuspid regurgitation is 31 mmHg.              Assessment and Plan {CC Problem List  Visit Diagnosis  ROS  Review (Popup)  Health Maintenance  Quality  BestPractice  Medications  SmartSets  SnapShot Encounters  Media :23}   1. Palpitations  Low burden on recent extended holter     2. Aortic valve insufficiency, etiology of cardiac valve disease unspecified    - Ambulatory Referral to Cardiology    3. Mitral valve insufficiency, unspecified etiology    - Ambulatory Referral to Cardiology    4. Cardiac murmur              Follow Up {Instructions Charge Capture  Follow-up Communications :23}   Return if symptoms worsen or fail to improve.    Patient was given instructions and counseling regarding her condition or for health maintenance advice. Please see specific information pulled into the AVS if appropriate.  Patient was instructed to call the [facility] with any questions, concerns, or worsening symptoms.

## 2022-09-14 PROCEDURE — 93248 EXT ECG>7D<15D REV&INTERPJ: CPT | Performed by: INTERNAL MEDICINE

## 2022-09-26 NOTE — PATIENT INSTRUCTIONS
Call complete for pre procedure reminder and updated visitor policy.  Per heart transplant criteria, Covid test not done.       Urinary Tract Infection, Adult  A urinary tract infection (UTI) is an infection of any part of the urinary tract. The urinary tract includes:  · The kidneys.  · The ureters.  · The bladder.  · The urethra.  These organs make, store, and get rid of pee (urine) in the body.  What are the causes?  This is caused by germs (bacteria) in your genital area. These germs grow and cause swelling (inflammation) of your urinary tract.  What increases the risk?  You are more likely to develop this condition if:  · You have a small, thin tube (catheter) to drain pee.  · You cannot control when you pee or poop (incontinence).  · You are female, and:  ? You use these methods to prevent pregnancy:  ? A medicine that kills sperm (spermicide).  ? A device that blocks sperm (diaphragm).  ? You have low levels of a female hormone (estrogen).  ? You are pregnant.  · You have genes that add to your risk.  · You are sexually active.  · You take antibiotic medicines.  · You have trouble peeing because of:  ? A prostate that is bigger than normal, if you are male.  ? A blockage in the part of your body that drains pee from the bladder (urethra).  ? A kidney stone.  ? A nerve condition that affects your bladder (neurogenic bladder).  ? Not getting enough to drink.  ? Not peeing often enough.  · You have other conditions, such as:  ? Diabetes.  ? A weak disease-fighting system (immune system).  ? Sickle cell disease.  ? Gout.  ? Injury of the spine.  What are the signs or symptoms?  Symptoms of this condition include:  · Needing to pee right away (urgently).  · Peeing often.  · Peeing small amounts often.  · Pain or burning when peeing.  · Blood in the pee.  · Pee that smells bad or not like normal.  · Trouble peeing.  · Pee that is cloudy.  · Fluid coming from the vagina, if you are female.  · Pain in the belly or lower back.  Other symptoms include:  · Throwing up (vomiting).  · No urge to eat.  · Feeling mixed up (confused).  · Being tired  and grouchy (irritable).  · A fever.  · Watery poop (diarrhea).  How is this treated?  This condition may be treated with:  · Antibiotic medicine.  · Other medicines.  · Drinking enough water.  Follow these instructions at home:    Medicines  · Take over-the-counter and prescription medicines only as told by your doctor.  · If you were prescribed an antibiotic medicine, take it as told by your doctor. Do not stop taking it even if you start to feel better.  General instructions  · Make sure you:  ? Pee until your bladder is empty.  ? Do not hold pee for a long time.  ? Empty your bladder after sex.  ? Wipe from front to back after pooping if you are a female. Use each tissue one time when you wipe.  · Drink enough fluid to keep your pee pale yellow.  · Keep all follow-up visits as told by your doctor. This is important.  Contact a doctor if:  · You do not get better after 1-2 days.  · Your symptoms go away and then come back.  Get help right away if:  · You have very bad back pain.  · You have very bad pain in your lower belly.  · You have a fever.  · You are sick to your stomach (nauseous).  · You are throwing up.  Summary  · A urinary tract infection (UTI) is an infection of any part of the urinary tract.  · This condition is caused by germs in your genital area.  · There are many risk factors for a UTI. These include having a small, thin tube to drain pee and not being able to control when you pee or poop.  · Treatment includes antibiotic medicines for germs.  · Drink enough fluid to keep your pee pale yellow.  This information is not intended to replace advice given to you by your health care provider. Make sure you discuss any questions you have with your health care provider.  Document Released: 06/05/2009 Document Revised: 12/05/2019 Document Reviewed: 06/27/2019  Elsevier Patient Education © 2020 [employer]

## 2022-10-04 RX ORDER — OMEPRAZOLE 20 MG/1
CAPSULE, DELAYED RELEASE ORAL
Qty: 90 CAPSULE | Refills: 1 | Status: SHIPPED | OUTPATIENT
Start: 2022-10-04

## 2022-10-24 ENCOUNTER — OFFICE VISIT (OUTPATIENT)
Dept: CARDIOLOGY | Facility: CLINIC | Age: 72
End: 2022-10-24

## 2022-10-24 VITALS
HEART RATE: 76 BPM | WEIGHT: 171 LBS | DIASTOLIC BLOOD PRESSURE: 76 MMHG | BODY MASS INDEX: 30.3 KG/M2 | SYSTOLIC BLOOD PRESSURE: 138 MMHG | HEIGHT: 63 IN | OXYGEN SATURATION: 98 %

## 2022-10-24 DIAGNOSIS — I35.0 NONRHEUMATIC AORTIC VALVE STENOSIS: Primary | ICD-10-CM

## 2022-10-24 PROCEDURE — 99214 OFFICE O/P EST MOD 30 MIN: CPT | Performed by: INTERNAL MEDICINE

## 2022-10-24 NOTE — PROGRESS NOTES
[facility]  Consultation H&P  Lanette Simpson  1950  [phone]  There is no work phone number on file..    VISIT DATE:  10/24/2022    PCP: Janette Fontana MD  [address] [facility] [id number]    CC:  Chief Complaint   Patient presents with   • Heart Murmur   • Aortic valve insufficiency   • Edema       Previous cardiac studies and procedures:  March 2019 TTE  • Calculated EF = 65.0%.  • Left ventricular wall thickness is consistent with mild concentric hypertrophy.  • Left ventricular systolic function is normal.  • Left ventricular diastolic dysfunction (grade I) consistent with impaired relaxation.  • No significant valvular abnormality.    September 2022  • Ambulatory ECG monitor, 2-week   • A relatively benign monitor study.  • The predominant rhythm noted during the testing period was sinus rhythm. Premature atrial contractions occured rarely. There was no evidence of atrial arrhythmias  • There were 6 episodes of supraventricular tachycardia. The peak heart rate was 169 beats per minute which lasted for 3 beats. The longest SVT lasted for 11 beats.  • There was one episodes of ventricular tachycardia. The peak heart rate was 144 beats per minute which lasted for 6 beats.  TTE  • Left ventricular ejection fraction appears to be 61 - 65%. Left ventricular systolic function is normal.  • Left ventricular wall thickness is consistent with mild concentric hypertrophy.  • Mild aortic valve stenosis is present.  • Moderate aortic valve regurgitation is present.  • Estimated right ventricular systolic pressure from tricuspid regurgitation is normal (<35 mmHg). Calculated right ventricular systolic pressure from tricuspid regurgitation is 31 mmHg.      ASSESSMENT:   Diagnosis Plan   1. Nonrheumatic aortic valve stenosis            PLAN:  Aortic valve disease: Mild aortic stenosis with associated moderate aortic insufficiency.  Currently symptomatic.   "Secondary to age-related degenerative valve disease.  Recommending repeat echo in 1 year, stable will repeat every 2 to 3 years with annual clinical follow-up.    Dependent edema, bilateral: Mild.  Currently not affecting quality life.  Discussed routine exercise and weight management.  Continue low-dose Lasix.  As needed use of compression stockings.    History of Present Illness   72-year-old female recently underwent transthoracic echo after primary care physician uncovered cardiac murmur.  Echocardiogram revealed degenerative aortic valve disease with mild aortic valve stenosis and associated moderate aortic insufficiency.  Reviewed and compared to previous echo performed March 2019, there has been more significant progression in aortic valve thickening that I would have anticipated.  She has a history of non-Hodgkin's lymphoma but did not receive any chest radiation.  She denies exertional chest discomfort.  No presyncope or syncope.  No limiting dyspnea on exertion.  Blood pressures tend to run less than 135/85 mmHg.    PHYSICAL EXAMINATION:  Vitals:    10/24/22 0930   BP: 138/76   BP Location: Left arm   Patient Position: Sitting   Pulse: 76   SpO2: 98%   Weight: 77.6 kg (171 lb)   Height: 160 cm (63\")     General Appearance:    Alert, cooperative, no distress, appears stated age   Head:    Normocephalic, without obvious abnormality, atraumatic   Eyes:    conjunctiva/corneas clear, EOM's intact, fundi     benign, both eyes   Ears:    Normal TM's and external ear canals, both ears   Nose:   Nares normal, septum midline, mucosa normal, no drainage    or sinus tenderness   Throat:   Lips, mucosa, and tongue normal; teeth and gums normal   Neck:   Supple, symmetrical, trachea midline, no adenopathy;     thyroid:  no enlargement/tenderness/nodules; no carotid    bruit or JVD   Back:     Symmetric, no curvature, ROM normal, no CVA tenderness   Lungs:     Clear to auscultation bilaterally, respirations unlabored "   Chest Wall:    No tenderness or deformity    Heart:    Regular rate and rhythm, S1 and S2 normal, 3 of 6 early peaking systolic murmur right upper sternal border, no rub   or gallop, normal carotid impulse bilaterally without bruit.   Abdomen:     Soft, non-tender, bowel sounds active all four quadrants,     no masses, no organomegaly   Extremities:   Extremities normal, atraumatic, no cyanosis.  Trivial bilateral pretibial edema   Pulses:   2+ and symmetric all extremities   Skin:   Skin color, texture, turgor normal, no rashes or lesions   Lymph nodes:   Cervical, supraclavicular, and axillary nodes normal   Neurologic:   normal strength, sensation intact     throughout       Diagnostic Data:  Procedures  No results found for: CHLPL, TRIG, HDL, LDLDIRECT  Lab Results   Component Value Date    GLUCOSE 108 (H) 05/02/2022    BUN 15 05/02/2022    CREATININE 0.89 05/02/2022     05/02/2022    K 4.9 05/02/2022     05/02/2022    CO2 27.0 05/02/2022     No results found for: HGBA1C  Lab Results   Component Value Date    WBC 8.46 05/02/2022    HGB 14.4 05/02/2022    HCT 42.5 05/02/2022     (H) 05/02/2022       PROBLEM LIST:  Patient Active Problem List   Diagnosis   • Ocular histoplasmosis   • GERD (gastroesophageal reflux disease)   • Varicose vein of leg   • Urinary frequency   • Diffuse large B-cell lymphoma of lymph nodes of head (HCC)   • Overactive bladder   • Primary insomnia   • Age-related osteoporosis without current pathological fracture   • Nonrheumatic aortic valve stenosis       PAST MEDICAL HX  Past Medical History:   Diagnosis Date   • Age-related osteoporosis without current pathological fracture 03/07/2022   • Arthritis    • Colon polyp 12/05/2013    benign   • Colon, diverticulosis    • Degenerative arthritis of spine    • Diffuse large B cell lymphoma (HCC) 2019   • Nonalcoholic hepatosteatosis    • OAB (overactive bladder)    • Osteopenia    • Wears glasses        Allergies  No  Known Allergies    Current Medications    Current Outpatient Medications:   •  alendronate (Fosamax) 70 MG tablet, Take 1 tablet by mouth Every 7 (Seven) Days., Disp: 4 tablet, Rfl: 11  •  Calcium Carb-Cholecalciferol (CALCIUM+D3 PO), Take 1 tablet by mouth Daily., Disp: , Rfl:   •  furosemide (LASIX) 20 MG tablet, Take 1 tablet by mouth As Needed (fluid retention). For fluid retention, Disp: 10 tablet, Rfl: 0  •  Multiple Vitamins-Minerals (MULTIVITAMIN ADULT PO), Take 1 tablet by mouth Daily., Disp: , Rfl:   •  omeprazole (priLOSEC) 20 MG capsule, TAKE ONE CAPSULE BY MOUTH EVERY NIGHT AT BEDTIME, Disp: 90 capsule, Rfl: 1  •  Probiotic Product (PROBIOTIC PO), Take 1 tablet by mouth Daily., Disp: , Rfl:   •  traZODone (DESYREL) 50 MG tablet, Take 0.5-2 tablets by mouth At Night As Needed for Sleep., Disp: 60 tablet, Rfl: 11         ROS  ROS      SOCIAL HX  Social History     Socioeconomic History   • Marital status:      Spouse name: Jay   • Number of children: 3   Tobacco Use   • Smoking status: Never   • Smokeless tobacco: Never   Vaping Use   • Vaping Use: Never used   Substance and Sexual Activity   • Alcohol use: No   • Drug use: No   • Sexual activity: Not Currently     Partners: Male       FAMILY HX  Family History   Problem Relation Age of Onset   • Heart disease Mother    • Hyperlipidemia Mother    • Pancreatic cancer Mother    • Hypertension Mother    • Heart disease Father    • Diabetes Father    • Prostate cancer Father    • Diabetes Sister    • Hypertension Sister    • Heart disease Brother    • Diabetes Brother    • Hypertension Brother    • Gout Brother    • Diabetes Brother    • Hypertension Brother    • Breast cancer Maternal Grandmother    • Stroke Maternal Grandfather    • Breast cancer Paternal Grandmother    • Glaucoma Paternal Grandfather              Donald Cardenas III, MD, [facility]

## 2022-11-02 ENCOUNTER — LAB (OUTPATIENT)
Dept: LAB | Facility: HOSPITAL | Age: 72
End: 2022-11-02

## 2022-11-02 ENCOUNTER — HOSPITAL ENCOUNTER (OUTPATIENT)
Dept: CT IMAGING | Facility: HOSPITAL | Age: 72
Discharge: HOME | End: 2022-11-02

## 2022-11-02 DIAGNOSIS — C83.31 DIFFUSE LARGE B-CELL LYMPHOMA OF LYMPH NODES OF HEAD: ICD-10-CM

## 2022-11-02 LAB
ALBUMIN SERPL-MCNC: 4.4 G/DL (ref 3.5–5.2)
ALBUMIN/GLOB SERPL: 1.5 G/DL
ALP SERPL-CCNC: 56 U/L (ref 39–117)
ALT SERPL W P-5'-P-CCNC: 21 U/L (ref 1–33)
ANION GAP SERPL CALCULATED.3IONS-SCNC: 10 MMOL/L (ref 5–15)
AST SERPL-CCNC: 22 U/L (ref 1–32)
BASOPHILS # BLD AUTO: 0.11 10*3/MM3 (ref 0–0.2)
BASOPHILS NFR BLD AUTO: 1.5 % (ref 0–1.5)
BILIRUB SERPL-MCNC: 0.6 MG/DL (ref 0–1.2)
BUN SERPL-MCNC: 17 MG/DL (ref 8–23)
BUN/CREAT SERPL: 21.8 (ref 7–25)
CALCIUM SPEC-SCNC: 9.2 MG/DL (ref 8.6–10.5)
CHLORIDE SERPL-SCNC: 106 MMOL/L (ref 98–107)
CO2 SERPL-SCNC: 24 MMOL/L (ref 22–29)
CREAT BLDA-MCNC: 0.7 MG/DL (ref 0.6–1.3)
CREAT SERPL-MCNC: 0.78 MG/DL (ref 0.57–1)
DEPRECATED RDW RBC AUTO: 50.9 FL (ref 37–54)
EGFRCR SERPLBLD CKD-EPI 2021: 80.3 ML/MIN/1.73
EOSINOPHIL # BLD AUTO: 0.33 10*3/MM3 (ref 0–0.4)
EOSINOPHIL NFR BLD AUTO: 4.4 % (ref 0.3–6.2)
ERYTHROCYTE [DISTWIDTH] IN BLOOD BY AUTOMATED COUNT: 14.6 % (ref 12.3–15.4)
GLOBULIN UR ELPH-MCNC: 3 GM/DL
GLUCOSE SERPL-MCNC: 88 MG/DL (ref 65–99)
HCT VFR BLD AUTO: 45.4 % (ref 34–46.6)
HGB BLD-MCNC: 15 G/DL (ref 12–15.9)
IMM GRANULOCYTES # BLD AUTO: 0.03 10*3/MM3 (ref 0–0.05)
IMM GRANULOCYTES NFR BLD AUTO: 0.4 % (ref 0–0.5)
LYMPHOCYTES # BLD AUTO: 1.79 10*3/MM3 (ref 0.7–3.1)
LYMPHOCYTES NFR BLD AUTO: 23.8 % (ref 19.6–45.3)
MCH RBC QN AUTO: 31.6 PG (ref 26.6–33)
MCHC RBC AUTO-ENTMCNC: 33 G/DL (ref 31.5–35.7)
MCV RBC AUTO: 95.6 FL (ref 79–97)
MONOCYTES # BLD AUTO: 0.64 10*3/MM3 (ref 0.1–0.9)
MONOCYTES NFR BLD AUTO: 8.5 % (ref 5–12)
NEUTROPHILS NFR BLD AUTO: 4.62 10*3/MM3 (ref 1.7–7)
NEUTROPHILS NFR BLD AUTO: 61.4 % (ref 42.7–76)
NRBC BLD AUTO-RTO: 0 /100 WBC (ref 0–0.2)
PLATELET # BLD AUTO: 461 10*3/MM3 (ref 140–450)
PMV BLD AUTO: 9.9 FL (ref 6–12)
POTASSIUM SERPL-SCNC: 3.9 MMOL/L (ref 3.5–5.2)
PROT SERPL-MCNC: 7.4 G/DL (ref 6–8.5)
RBC # BLD AUTO: 4.75 10*6/MM3 (ref 3.77–5.28)
SODIUM SERPL-SCNC: 140 MMOL/L (ref 136–145)
WBC NRBC COR # BLD: 7.52 10*3/MM3 (ref 3.4–10.8)

## 2022-11-02 PROCEDURE — 25010000002 IOPAMIDOL 61 % SOLUTION: Performed by: INTERNAL MEDICINE

## 2022-11-02 PROCEDURE — 82565 ASSAY OF CREATININE: CPT

## 2022-11-02 PROCEDURE — 36415 COLL VENOUS BLD VENIPUNCTURE: CPT

## 2022-11-02 PROCEDURE — 85025 COMPLETE CBC W/AUTO DIFF WBC: CPT

## 2022-11-02 PROCEDURE — 80053 COMPREHEN METABOLIC PANEL: CPT

## 2022-11-02 PROCEDURE — 74177 CT ABD & PELVIS W/CONTRAST: CPT

## 2022-11-02 PROCEDURE — 71260 CT THORAX DX C+: CPT

## 2022-11-02 RX ADMIN — IOPAMIDOL 98 ML: 612 INJECTION, SOLUTION INTRAVENOUS at 10:42

## 2022-11-07 NOTE — PROGRESS NOTES
DATE OF VISIT: 11/8/2022    REASON FOR VISIT: Followup for mandible diffuse large B-cell lymphoma     PROBLEM LIST:  1. Mandible diffuse large B-cell lymphoma, stage IIE:  A.  Presented with gum pain and swelling  B.  CT facial bones done on February 26, 2019 revealed 2.3 cm lucency in the anterior mandible  C.  Status post biopsy done on March 7, 2019 consistent with diffuse large B-cell lymphoma  D.  Bone marrow biopsy done on March 25, 2019 no evidence of lymphoma involvement.  E.  Whole body PET scan done on March 28, 2019 revealed hypermetabolic activity in the right curtis-mandible as well as jugular cervical chains hypermetabolic lymphadenopathy level 2 and level 3.  F. Started chemotherapy with R-CHOP April 2019,status post 6 cycles, completed July 22, 2019.  2.  Osteoarthritis  3.  Active bladder  4.  Heartburn    HISTORY OF PRESENT ILLNESS: The patient is a very pleasant 72 y.o. 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  Staging workup revealed stage IIE disease.  Patient was started on definitive chemotherapy with R CHOP April 8, 2019.  The patient completed cycle #6 June 22, 2019.  The patient is here today for scheduled follow-up visit.    SUBJECTIVE: The patient is here today by herself. She has been doing fairly well since her last visit. She has had no significant changes in health history. She has had no cough, shortness of breath, fevers, chills, or night sweats. She has retired from work and is staying home caring for her .     Past History:  Medical History: has a past medical history of Age-related osteoporosis without current pathological fracture (03/07/2022), Arthritis, Colon polyp (12/05/2013), Colon, diverticulosis, Degenerative arthritis of spine, Diffuse large B cell lymphoma (HCC) (2019), Nonalcoholic hepatosteatosis, OAB (overactive bladder), Osteopenia, and Wears glasses.   Surgical History: has a past surgical  "history that includes Total abdominal hysterectomy; Bone marrow biopsy; Bone biopsy; Colonoscopy (2013); and Venous Access Device (Port) (N/A, 4/4/2019).   Family History: family history includes Breast cancer in her maternal grandmother and paternal grandmother; Diabetes in her brother, brother, father, and sister; Glaucoma in her paternal grandfather; Gout in her brother; Heart disease in her brother, father, and mother; Hyperlipidemia in her mother; Hypertension in her brother, brother, mother, and sister; Pancreatic cancer in her mother; Prostate cancer in her father; Stroke in her maternal grandfather.   Social History: reports that she has never smoked. She has never used smokeless tobacco. She reports that she does not drink alcohol and does not use drugs.    (Not in a hospital admission)     Allergies: Patient has no known allergies.    Review of Systems   Constitutional: Positive for fatigue.   All other systems reviewed and are negative.      PHYSICAL EXAMINATION:   /57   Pulse 84   Temp 96.8 °F (36 °C) (Temporal)   Resp 16   Ht 160 cm (62.99\")   Wt 77.6 kg (171 lb)   SpO2 98%   BMI 30.30 kg/m²    Pain Score    11/08/22 1055   PainSc: 0-No pain        ECOG Performance Status: 0 - Asymptomatic  General Appearance:  alert, cooperative, no apparent distress and appears stated age   Lungs:   Clear to auscultation bilaterally; respirations regular, even, and unlabored bilaterally   Heart:  Regular rate and rhythm, no murmurs appreciated   Abdomen:   Soft, non-tender, non-distended and no organomegaly     Hospital Outpatient Visit on 11/02/2022   Component Date Value Ref Range Status   • Creatinine 11/02/2022 0.70  0.60 - 1.30 mg/dL Final    Serial Number: [id number]  [id number]   Lab on 11/02/2022   Component Date Value Ref Range Status   • Glucose 11/02/2022 88  65 - 99 mg/dL Final   • BUN 11/02/2022 17  8 - 23 mg/dL Final   • Creatinine 11/02/2022 0.78  0.57 - 1.00 mg/dL Final   • Sodium " 11/02/2022 140  136 - 145 mmol/L Final   • Potassium 11/02/2022 3.9  3.5 - 5.2 mmol/L Final   • Chloride 11/02/2022 106  98 - 107 mmol/L Final   • CO2 11/02/2022 24.0  22.0 - 29.0 mmol/L Final   • Calcium 11/02/2022 9.2  8.6 - 10.5 mg/dL Final   • Total Protein 11/02/2022 7.4  6.0 - 8.5 g/dL Final   • Albumin 11/02/2022 4.40  3.50 - 5.20 g/dL Final   • ALT (SGPT) 11/02/2022 21  1 - 33 U/L Final   • AST (SGOT) 11/02/2022 22  1 - 32 U/L Final   • Alkaline Phosphatase 11/02/2022 56  39 - 117 U/L Final   • Total Bilirubin 11/02/2022 0.6  0.0 - 1.2 mg/dL Final   • Globulin 11/02/2022 3.0  gm/dL Final    Calculated Result   • A/G Ratio 11/02/2022 1.5  g/dL Final   • BUN/Creatinine Ratio 11/02/2022 21.8  7.0 - 25.0 Final   • Anion Gap 11/02/2022 10.0  5.0 - 15.0 mmol/L Final   • eGFR 11/02/2022 80.3  >60.0 mL/min/1.73 Final    National Kidney Foundation and American Society of Nephrology (ASN) Task Force recommended calculation based on the Chronic Kidney Disease Epidemiology Collaboration (CKD-EPI) equation refit without adjustment for race.   • WBC 11/02/2022 7.52  3.40 - 10.80 10*3/mm3 Final   • RBC 11/02/2022 4.75  3.77 - 5.28 10*6/mm3 Final   • Hemoglobin 11/02/2022 15.0  12.0 - 15.9 g/dL Final   • Hematocrit 11/02/2022 45.4  34.0 - 46.6 % Final   • MCV 11/02/2022 95.6  79.0 - 97.0 fL Final   • MCH 11/02/2022 31.6  26.6 - 33.0 pg Final   • MCHC 11/02/2022 33.0  31.5 - 35.7 g/dL Final   • RDW 11/02/2022 14.6  12.3 - 15.4 % Final   • RDW-SD 11/02/2022 50.9  37.0 - 54.0 fl Final   • MPV 11/02/2022 9.9  6.0 - 12.0 fL Final   • Platelets 11/02/2022 461 (H)  140 - 450 10*3/mm3 Final   • Neutrophil % 11/02/2022 61.4  42.7 - 76.0 % Final   • Lymphocyte % 11/02/2022 23.8  19.6 - 45.3 % Final   • Monocyte % 11/02/2022 8.5  5.0 - 12.0 % Final   • Eosinophil % 11/02/2022 4.4  0.3 - 6.2 % Final   • Basophil % 11/02/2022 1.5  0.0 - 1.5 % Final   • Immature Grans % 11/02/2022 0.4  0.0 - 0.5 % Final   • Neutrophils, Absolute  11/02/2022 4.62  1.70 - 7.00 10*3/mm3 Final   • Lymphocytes, Absolute 11/02/2022 1.79  0.70 - 3.10 10*3/mm3 Final   • Monocytes, Absolute 11/02/2022 0.64  0.10 - 0.90 10*3/mm3 Final   • Eosinophils, Absolute 11/02/2022 0.33  0.00 - 0.40 10*3/mm3 Final   • Basophils, Absolute 11/02/2022 0.11  0.00 - 0.20 10*3/mm3 Final   • Immature Grans, Absolute 11/02/2022 0.03  0.00 - 0.05 10*3/mm3 Final   • nRBC 11/02/2022 0.0  0.0 - 0.2 /100 WBC Final        CT Chest With Contrast Diagnostic, CT Abdomen Pelvis With Contrast    Result Date: 11/2/2022  Narrative: DATE OF EXAM: 11/2/2022 10:26 AM  PROCEDURE: CT CHEST W CONTRAST DIAGNOSTIC-, CT ABDOMEN PELVIS W CONTRAST-  INDICATIONS: f/u scans; C83.31-Diffuse large b-cell lymphoma, lymph nodes of head, face, and neck  COMPARISON: Outside CT 4/29/2021  TECHNIQUE: Routine transaxial slices were obtained through the chest, abdomen and pelvis after the intravenous administration of 98 mL of Isovue 300. Reconstructed coronal and sagittal images were also obtained. Automated exposure control and iterative construction methods were used.  The radiation dose reduction device was turned on for each scan per the ALARA (As Low as Reasonably Achievable) protocol.  FINDINGS: Chest: There is no pathologic axillary adenopathy or other worrisome body wall soft tissue finding in the chest. There is no pleural or pericardial effusion. Evaluation of the osseous structures demonstrates multilevel thoracic spondylosis, otherwise without evidence of fracture or aggressive osseous lesion. There is no pathologic mediastinal or hilar lymphadenopathy. Mildly atherosclerotic, nonaneurysmal thoracic aorta. Evaluation of the lung fields demonstrates no evidence of suspicious pulmonary nodularity. Some minimal faint peripheral peribronchial groundglass opacity is seen in the right lower lobe.  Abdomen and pelvis: The body wall soft tissues demonstrate no acute or suspicious findings. Evaluation of the osseous  structures demonstrates lumbar spondylosis, without evidence of acute fracture or aggressive osseous lesion. The liver, spleen, pancreas and bilateral adrenal glands demonstrate no evidence of acute or new suspicious findings. The gallbladder is normal. The kidneys demonstrate symmetric nephrogram and contrast excretion without evidence of hydronephrosis or contour deforming mass. Small and large bowel loops are nondilated. There is no suspicious focal bowel wall thickening. There is no free fluid or pneumoperitoneum. Atherosclerotic, nonaneurysmal abdominal aorta. There is no bulky retroperitoneal lymphadenopathy. The pelvic viscera demonstrate no acute findings. Stable tiny sclerotic finding along the proximal left femur.      Impression: No specific findings concerning for progressive or recurrent lymphomatous disease in the chest, abdomen and pelvis.  Minimal, likely incidental peribronchial groundglass opacity seen peripherally in the right lower lobe, likely some mild and potentially subclinical infectious/inflammatory change. No acute findings are otherwise noted.  This report was finalized on 11/2/2022 12:05 PM by Ulices Morley.        ASSESSMENT: The patient is a very pleasant 72 y.o. female  with mandible diffuse large B-cell lymphoma.    PLAN:  1.  Diffuse large B-cell lymphoma:  A.  I reviewed the CAT scan results from 11/2/2022 with the patient. I reassured her that her scans show no evidence of new or progressive disease. There was noted to be some peribronchial grounglass opacity in the right lower lobe that appears consistent with subclinical infection and is stable from previous imaging done May 2022.   B. I reviewed the lab results from 11/2/2022 with the patient. I reassured her that her WBC and hemoglobin are within normal limits. She has mild thrombocytosis with platelet count of 461,000.  Her creatinine, calcium, and liver enzymes are all within normal limits.   C. We will continue watchful  waiting with plan to repeat her imaging studies in 1 year with repeat labs that will be ordered for prior to return.    2.  Heartburn:  A.  She will continue omeprazole 20 mg daily for acid reflux.     3.  Osteoporosis:  A.  The patient will continue calcium with vitamin D as well as Fosamax weekly.    FOLLOW UP: 6 months with labs and scans.     Migdalia Blanca, APRN  11/8/2022

## 2022-11-08 ENCOUNTER — OFFICE VISIT (OUTPATIENT)
Dept: ONCOLOGY | Facility: CLINIC | Age: 72
End: 2022-11-08

## 2022-11-08 VITALS
OXYGEN SATURATION: 98 % | WEIGHT: 171 LBS | HEIGHT: 63 IN | DIASTOLIC BLOOD PRESSURE: 57 MMHG | HEART RATE: 84 BPM | RESPIRATION RATE: 16 BRPM | SYSTOLIC BLOOD PRESSURE: 113 MMHG | BODY MASS INDEX: 30.3 KG/M2 | TEMPERATURE: 96.8 F

## 2022-11-08 DIAGNOSIS — C83.31 DIFFUSE LARGE B-CELL LYMPHOMA OF LYMPH NODES OF HEAD: Primary | ICD-10-CM

## 2022-11-08 PROCEDURE — 99214 OFFICE O/P EST MOD 30 MIN: CPT | Performed by: NURSE PRACTITIONER

## 2022-12-12 ENCOUNTER — OFFICE VISIT (OUTPATIENT)
Dept: FAMILY MEDICINE CLINIC | Facility: CLINIC | Age: 72
End: 2022-12-12

## 2022-12-12 VITALS
WEIGHT: 170.2 LBS | HEIGHT: 63 IN | OXYGEN SATURATION: 96 % | BODY MASS INDEX: 30.16 KG/M2 | SYSTOLIC BLOOD PRESSURE: 124 MMHG | DIASTOLIC BLOOD PRESSURE: 80 MMHG | HEART RATE: 83 BPM

## 2022-12-12 DIAGNOSIS — F51.01 PRIMARY INSOMNIA: ICD-10-CM

## 2022-12-12 DIAGNOSIS — Z00.00 MEDICARE ANNUAL WELLNESS VISIT, SUBSEQUENT: Primary | ICD-10-CM

## 2022-12-12 DIAGNOSIS — Z23 IMMUNIZATION DUE: ICD-10-CM

## 2022-12-12 DIAGNOSIS — B07.0 PLANTAR WART OF RIGHT FOOT: ICD-10-CM

## 2022-12-12 DIAGNOSIS — M81.0 AGE-RELATED OSTEOPOROSIS WITHOUT CURRENT PATHOLOGICAL FRACTURE: ICD-10-CM

## 2022-12-12 DIAGNOSIS — I35.0 NONRHEUMATIC AORTIC VALVE STENOSIS: ICD-10-CM

## 2022-12-12 DIAGNOSIS — K21.9 GASTROESOPHAGEAL REFLUX DISEASE, UNSPECIFIED WHETHER ESOPHAGITIS PRESENT: ICD-10-CM

## 2022-12-12 PROCEDURE — 1170F FXNL STATUS ASSESSED: CPT | Performed by: FAMILY MEDICINE

## 2022-12-12 PROCEDURE — 99397 PER PM REEVAL EST PAT 65+ YR: CPT | Performed by: FAMILY MEDICINE

## 2022-12-12 PROCEDURE — 1159F MED LIST DOCD IN RCRD: CPT | Performed by: FAMILY MEDICINE

## 2022-12-12 PROCEDURE — 90677 PCV20 VACCINE IM: CPT | Performed by: FAMILY MEDICINE

## 2022-12-12 PROCEDURE — 1125F AMNT PAIN NOTED PAIN PRSNT: CPT | Performed by: FAMILY MEDICINE

## 2022-12-12 PROCEDURE — G0439 PPPS, SUBSEQ VISIT: HCPCS | Performed by: FAMILY MEDICINE

## 2022-12-12 PROCEDURE — G0009 ADMIN PNEUMOCOCCAL VACCINE: HCPCS | Performed by: FAMILY MEDICINE

## 2022-12-12 PROCEDURE — 96160 PT-FOCUSED HLTH RISK ASSMT: CPT | Performed by: FAMILY MEDICINE

## 2022-12-12 RX ORDER — TRAZODONE HYDROCHLORIDE 50 MG/1
50 TABLET ORAL NIGHTLY PRN
Qty: 90 TABLET | Refills: 3 | Status: SHIPPED | OUTPATIENT
Start: 2022-12-12

## 2022-12-12 RX ORDER — ALENDRONATE SODIUM 70 MG/1
70 TABLET ORAL
Qty: 12 TABLET | Refills: 4 | Status: SHIPPED | OUTPATIENT
Start: 2022-12-12

## 2022-12-12 NOTE — PATIENT INSTRUCTIONS
Advance Care Planning and Advance Directives     You make decisions on a daily basis - decisions about where you want to live, your career, your home, your life. Perhaps one of the most important decisions you face is your choice for future medical care. Take time to talk with your family and your healthcare team and start planning today.  Advance Care Planning is a process that can help you:  Understand possible future healthcare decisions in light of your own experiences  Reflect on those decision in light of your goals and values  Discuss your decisions with those closest to you and the healthcare professionals that care for you  Make a plan by creating a document that reflects your wishes    Surrogate Decision Maker  In the event of a medical emergency, which has left you unable to communicate or to make your own decisions, you would need someone to make decisions for you.  It is important to discuss your preferences for medical treatment with this person while you are in good health.     Qualities of a surrogate decision maker:  Willing to take on this role and responsibility  Knows what you want for future medical care  Willing to follow your wishes even if they don't agree with them  Able to make difficult medical decisions under stressful circumstances    Advance Directives  These are legal documents you can create that will guide your healthcare team and decision maker(s) when needed. These documents can be stored in the electronic medical record.    Living Will - a legal document to guide your care if you have a terminal condition or a serious illness and are unable to communicate. States vary by statute in document names/types, but most forms may include one or more of the following:        -  Directions regarding life-prolonging treatments        -  Directions regarding artificially provided nutrition/hydration        -  Choosing a healthcare decision maker        -  Direction regarding organ/tissue  donation    Durable Power of  for Healthcare - this document names an -in-fact to make medical decisions for you, but it may also allow this person to make personal and financial decisions for you. Please seek the advice of an  if you need this type of document.    **Advance Directives are not required and no one may discriminate against you if you do not sign one.    Medical Orders  Many states allow specific forms/orders signed by your physician to record your wishes for medical treatment in your current state of health. This form, signed in personal communication with your physician, addresses resuscitation and other medical interventions that you may or may not want.      For more information or to schedule a time with a [facility] Advance Care Planning Facilitator contact: [facility] or call [phone] and someone will contact you directly.  You are due for adacel Tdap vaccination. (provides protection against tetanus, diptheria and whooping cough) Please  get the immunization at your local pharmacy at your earliest convenience. This immunization will next be due in 10 years. Please click on the link for more information about this vaccine.    [facility] Tdap Vaccine Information    You are due for Shingrix vaccination series ( the newest shingles vaccine).  It is a two shot series spaced 2-6 months apart. Please get this vaccine series started at your earliest convenience at your local pharmacy to help avoid shingles outbreak. It is more effective than the old Zostavax vaccine and is recommended even if you have had the Zostavax vaccine in the past.  Once the Shingrix series is completed, it does not need to be repeated.   For more information, please look at the website below:  [facility] Shingrix Vaccine Information      Medicare Wellness  Personal Prevention Plan of Service     Date of Office Visit:    Encounter Provider:  Janette Fontana MD  Place of Service:  [facility]  Patient Name: Lanette Simpson  :  1950    As part of the Medicare Wellness portion of your visit today, we are providing you with this personalized preventive plan of services (PPPS). This plan is based upon recommendations of the [place] Preventive Services Task Force (USPSTF) and the Advisory Committee on Immunization Practices (ACIP).    This lists the preventive care services that should be considered, and provides dates of when you are due. Items listed as completed are up-to-date and do not require any further intervention.    Health Maintenance   Topic Date Due    TDAP/TD VACCINES (1 - Tdap) Never done    ZOSTER VACCINE (1 of 2) Never done    HEPATITIS C SCREENING  Never done    ANNUAL WELLNESS VISIT  2022    COVID-19 Vaccine (5 - Booster for Moderna series) 2022    MAMMOGRAM  2023    COLORECTAL CANCER SCREENING  2023    DXA SCAN  2024    INFLUENZA VACCINE  Completed    Pneumococcal Vaccine 65+  Completed       Orders Placed This Encounter   Procedures    Pneumococcal Conjugate Vaccine 20-[facility] All       Return in about 1 year (around 2023) for MWV and fasting labs.

## 2022-12-12 NOTE — PROGRESS NOTES
The ABCs of the Annual Wellness Visit  Subsequent Medicare Wellness Visit    Chief Complaint   Patient presents with   • Medicare Wellness-subsequent   • Annual Exam      Subjective    History of Present Illness:  Lanette Simpson is a 72 y.o. female who presents for a Subsequent Medicare Wellness Visit.    Answers for HPI/ROS submitted by the patient on 12/12/2022  What is the primary reason for your visit?: Physical    Morning toast and egg. Lunch peanut butter sandwich. Evening meals vegetables and sometimes meat. Loves nuts.     Caregiver for . Considering treadmill.     She continues to have swelling in left lower leg swelling, rarely right ankle swelling. She wears support hose daily.     Bottom of right foot has a spot with soreness started last week.     The following portions of the patient's history were reviewed and   updated as appropriate: allergies, current medications, past family history, past medical history, past social history, past surgical history and problem list.    Compared to one year ago, the patient feels her physical   health is better. In ways stronger and other ways not resting. Nap in the day.     Compared to one year ago, the patient feels her mental   health is better. She gets sad from times. Not depression. Mental stronger. Brain less foggy.     Recent Hospitalizations:  She was not admitted to the hospital during the last year.       Current Medical Providers:  Patient Care Team:  Janette Fontana MD as PCP - General (Family Medicine)  Joan Zuluaga MD as Consulting Physician (Hematology and Oncology)  Donald Cardenas III, MD as Cardiologist (Cardiology)    Outpatient Medications Prior to Visit   Medication Sig Dispense Refill   • Calcium Carb-Cholecalciferol (CALCIUM+D3 PO) Take 1 tablet by mouth Daily.     • furosemide (LASIX) 20 MG tablet Take 1 tablet by mouth As Needed (fluid retention). For fluid retention 10 tablet 0   • Multiple Vitamins-Minerals  "(MULTIVITAMIN ADULT PO) Take 1 tablet by mouth Daily.     • omeprazole (priLOSEC) 20 MG capsule TAKE ONE CAPSULE BY MOUTH EVERY NIGHT AT BEDTIME 90 capsule 1   • Probiotic Product (PROBIOTIC PO) Take 1 tablet by mouth Daily.     • alendronate (Fosamax) 70 MG tablet Take 1 tablet by mouth Every 7 (Seven) Days. 4 tablet 11   • traZODone (DESYREL) 50 MG tablet Take 0.5-2 tablets by mouth At Night As Needed for Sleep. 60 tablet 11     No facility-administered medications prior to visit.       No opioid medication identified on active medication list. I have reviewed chart for other potential  high risk medication/s and harmful drug interactions in the elderly.          Aspirin is not on active medication list.  Aspirin use is not indicated based on review of current medical condition/s. Risk of harm outweighs potential benefits.  .    Patient Active Problem List   Diagnosis   • Ocular histoplasmosis   • GERD (gastroesophageal reflux disease)   • Varicose vein of leg   • Urinary frequency   • Diffuse large B-cell lymphoma of lymph nodes of head (HCC)   • Overactive bladder   • Primary insomnia   • Age-related osteoporosis without current pathological fracture   • Nonrheumatic aortic valve stenosis     Advance Care Planning  Advance Directive is not on file.  ACP discussion was held with the patient during this visit. Patient does not have an advance directive, declines further assistance. she will complete Working on it.           Objective    Vitals:    12/12/22 1136   BP: 124/80   Pulse: 83   SpO2: 96%   Weight: 77.2 kg (170 lb 3.2 oz)   Height: 160 cm (62.99\")   PainSc:   7     Estimated body mass index is 30.16 kg/m² as calculated from the following:    Height as of this encounter: 160 cm (62.99\").    Weight as of this encounter: 77.2 kg (170 lb 3.2 oz).    BMI is >= 30 and <35. (Class 1 Obesity). The following options were offered after discussion;: exercise counseling/recommendations      Does the patient have " evidence of cognitive impairment? No    Physical Exam  Constitutional:       General: She is not in acute distress.     Appearance: She is obese.   HENT:      Right Ear: Tympanic membrane and ear canal normal.      Left Ear: Tympanic membrane and ear canal normal.   Eyes:      General:         Right eye: No discharge.         Left eye: No discharge.      Conjunctiva/sclera: Conjunctivae normal.   Neck:      Thyroid: No thyromegaly.   Cardiovascular:      Rate and Rhythm: Normal rate and regular rhythm.      Heart sounds: Murmur heard.    Systolic murmur is present with a grade of 3/6.  Pulmonary:      Effort: Pulmonary effort is normal.      Breath sounds: Normal breath sounds.   Abdominal:      Palpations: Abdomen is soft. There is no hepatomegaly.      Tenderness: There is no abdominal tenderness.   Musculoskeletal:      Cervical back: Neck supple.        Feet:    Lymphadenopathy:      Head:      Right side of head: No submandibular, preauricular or posterior auricular adenopathy.      Left side of head: No submandibular, preauricular or posterior auricular adenopathy.      Cervical: No cervical adenopathy.   Skin:     General: Skin is warm.   Neurological:      Mental Status: She is alert and oriented to person, place, and time.   Psychiatric:         Mood and Affect: Mood normal.         Behavior: Behavior normal.         Thought Content: Thought content normal.         Judgment: Judgment normal.                 HEALTH RISK ASSESSMENT    Smoking Status:  Social History     Tobacco Use   Smoking Status Never   Smokeless Tobacco Never     Alcohol Consumption:  Social History     Substance and Sexual Activity   Alcohol Use Never     Fall Risk Screen:    [facility], and patient is at LOW risk for falls.Assessment completed on:12/12/2022   [facility] Fall Risk Clinician Key Questions   Have you fallen in the past year?: No  Do you feel unsteady with walking?: No  Are you worried about  falling?: No    Stay Idependant Patient Questions   Have you been advised to use a cane or a walker to get around safely?: No  Do you steady yourself by holding onto furniture when walking at home?: Yes  Do you need to push with your hands to stand up from a chair?: No  Do you have trouble stepping up onto a curb?: No  Do you have to rush to the toilet?: No  Have you lost some feeling in your feet?: Yes  Do you take medicine that sometimes makes you feel light headed or more tired than usual?: No  Do you take medicine to help you sleep or improve your mood?: No  Do you often feel sad or depressed?: No  Patient Fall Risk Assessment Score : 2        Depression Screening:  PHQ-2/PHQ-9 Depression Screening 12/12/2022   Retired PHQ-9 Total Score -   Retired Total Score -   Little Interest or Pleasure in Doing Things 0-->not at all   Feeling Down, Depressed or Hopeless 0-->not at all   PHQ-9: Brief Depression Severity Measure Score 0       Health Habits and Functional and Cognitive Screening:  Functional & Cognitive Status 12/12/2022   Do you have difficulty preparing food and eating? No   Do you have difficulty bathing yourself, getting dressed or grooming yourself? No   Do you have difficulty using the toilet? No   Do you have difficulty moving around from place to place? No   Do you have trouble with steps or getting out of a bed or a chair? No   Current Diet Well Balanced Diet   Dental Exam Up to date   Eye Exam Up to date        Eye Exam Comment -   Exercise (times per week) 5 times per week   Current Exercises Include Walking   Current Exercise Activities Include -   Do you need help using the phone?  No   Are you deaf or do you have serious difficulty hearing?  Yes   Do you need help with transportation? No   Do you need help shopping? No   Do you need help preparing meals?  No   Do you need help with housework?  No   Do you need help with laundry? No   Do you need help taking your medications? No   Do you need  help managing money? No   Do you ever drive or ride in a car without wearing a seat belt? No   Have you felt unusual stress, anger or loneliness in the last month? No   Who do you live with? Spouse   If you need help, do you have trouble finding someone available to you? No   Have you been bothered in the last four weeks by sexual problems? No   Do you have difficulty concentrating, remembering or making decisions? Yes       Age-appropriate Screening Schedule:  Refer to the list below for future screening recommendations based on patient's age, sex and/or medical conditions. Orders for these recommended tests are listed in the plan section. The patient has been provided with a written plan.    Health Maintenance   Topic Date Due   • TDAP/TD VACCINES (1 - Tdap) Never done   • ZOSTER VACCINE (1 of 2) Never done   • MAMMOGRAM  06/13/2023   • DXA SCAN  03/07/2024   • INFLUENZA VACCINE  Completed            Office Visit with Migdalia Blanca APRN (11/08/2022)    Immunization History   Administered Date(s) Administered   • COVID-19 (MODERNA) 1st, 2nd, 3rd Dose Only 01/08/2021, 02/05/2021, 10/24/2021   • COVID-19 (MODERNA) BIVALENT BOOSTER 6+YRS 10/11/2022   • COVID-19 (MODERNA) BOOSTER 05/10/2022   • Fluad Quad 65+ 10/26/2021   • Fluzone High Dose =>65 Years ([employer] ONLY) 09/25/2020   • Fluzone High-Dose 65+yrs 10/05/2022   • Pneumococcal Conjugate 20-Valent (PCV20) 12/12/2022       Assessment & Plan   CMS Preventative Services Quick Reference  Risk Factors Identified During Encounter  Depression/Dysphoria: Discussed if periods of sadness increased to follow-up for care.  Immunizations Discussed/Encouraged: Prevnar 20 (Pneumococcal 20-valent conjugate)  Inadequate Social Support, Isolation, Loneliness, Lack of Transportation, Financial Difficulties, or Caregiver Stress: She is primary caregiver for her  with multiple healthcare needs.  Time she experiences sadness but no clinical depression.  The above  risks/problems have been discussed with the patient.  Follow up actions/plans if indicated are seen below in the Assessment/Plan Section.  Pertinent information has been shared with the patient in the After Visit Summary.    Diagnoses and all orders for this visit:    1. Medicare annual wellness visit, subsequent (Primary)  Counseling/anticipatory guidance: Nutrition, physical activity,mental health,  immunizations, screenings  Patient has labs routinely through oncology office and no additional lab work needed today.  Breast cancer screening mammogram up-to-date.  Colon cancer screening 2013 up-to-date.  2. Immunization due  -     Pneumococcal Conjugate Vaccine 20-Valent All    3. Primary insomnia  -     traZODone (DESYREL) 50 MG tablet; Take 1 tablet by mouth At Night As Needed for Sleep.  Dispense: 90 tablet; Refill: 3  Stable.  Continue trazodone.  4. Age-related osteoporosis without current pathological fracture  -     alendronate (Fosamax) 70 MG tablet; Take 1 tablet by mouth Every 7 (Seven) Days.  Dispense: 12 tablet; Refill: 4  Tolerating Fosamax.  Plan for next bone density in 2024.  Continue calcium and vitamin D supplements.  5. Gastroesophageal reflux disease, unspecified whether esophagitis present  PPI therapy.  6. Nonrheumatic aortic valve stenosis  She has been evaluated by cardiology with no further interventions needed.  7. Plantar wart of right foot  New.  Recommend treat with over-the-counter wart treatment and follow-up if not improved.      Follow Up:   Return in about 1 year (around 12/12/2023) for MWV and fasting labs.     An After Visit Summary and PPPS were made available to the patient.                   Electronically signed by Janette Fontana MD, 12/12/22, 12:42 PM EST.

## 2023-01-31 NOTE — ADDENDUM NOTE
Addended by: NIKI MALLORY on: 7/29/2022 09:02 PM     Modules accepted: Orders     Lab: [id number] Lab: [id number]

## 2023-04-17 RX ORDER — OMEPRAZOLE 20 MG/1
20 CAPSULE, DELAYED RELEASE ORAL
Qty: 90 CAPSULE | Refills: 1 | Status: SHIPPED | OUTPATIENT
Start: 2023-04-17

## 2023-04-17 NOTE — TELEPHONE ENCOUNTER
Caller: Lanette Simpson    Relationship: Self    Best call back number: [phone]    Requested Prescriptions:   Requested Prescriptions     Pending Prescriptions Disp Refills   • omeprazole (priLOSEC) 20 MG capsule 90 capsule 1     Sig: Take 1 capsule by mouth every night at bedtime.        Pharmacy where request should be sent: [facility] [id number] [address]  [facility] [phone] [facility] [phone]      Last office visit with prescribing clinician: 11/8/2022   Last telemedicine visit with prescribing clinician: Visit date not found   Next office visit with prescribing clinician: Visit date not found     Does the patient have less than a 3 day supply:  [] Yes  [x] No    Would you like a call back once the refill request has been completed: [x] Yes [] No    If the office needs to give you a call back, can they leave a voicemail: [x] Yes [] No

## 2023-05-19 ENCOUNTER — OFFICE VISIT (OUTPATIENT)
Dept: FAMILY MEDICINE CLINIC | Facility: CLINIC | Age: 73
End: 2023-05-19
Payer: MEDICARE

## 2023-05-19 VITALS
WEIGHT: 175 LBS | DIASTOLIC BLOOD PRESSURE: 88 MMHG | OXYGEN SATURATION: 98 % | HEART RATE: 98 BPM | SYSTOLIC BLOOD PRESSURE: 124 MMHG | HEIGHT: 63 IN | BODY MASS INDEX: 31.01 KG/M2

## 2023-05-19 DIAGNOSIS — R30.0 DYSURIA: ICD-10-CM

## 2023-05-19 DIAGNOSIS — J01.40 ACUTE NON-RECURRENT PANSINUSITIS: Primary | ICD-10-CM

## 2023-05-19 LAB
BILIRUB BLD-MCNC: NEGATIVE MG/DL
CLARITY, POC: CLEAR
COLOR UR: YELLOW
EXPIRATION DATE: ABNORMAL
EXPIRATION DATE: NORMAL
FLUAV AG UPPER RESP QL IA.RAPID: NOT DETECTED
FLUBV AG UPPER RESP QL IA.RAPID: NOT DETECTED
GLUCOSE UR STRIP-MCNC: NEGATIVE MG/DL
INTERNAL CONTROL: NORMAL
KETONES UR QL: NEGATIVE
LEUKOCYTE EST, POC: NEGATIVE
Lab: ABNORMAL
Lab: NORMAL
NITRITE UR-MCNC: NEGATIVE MG/ML
PH UR: 6 [PH] (ref 5–8)
PROT UR STRIP-MCNC: ABNORMAL MG/DL
RBC # UR STRIP: ABNORMAL /UL
SARS-COV-2 AG UPPER RESP QL IA.RAPID: NOT DETECTED
SP GR UR: 1.01 (ref 1–1.03)
UROBILINOGEN UR QL: NORMAL

## 2023-05-19 PROCEDURE — 1160F RVW MEDS BY RX/DR IN RCRD: CPT | Performed by: FAMILY MEDICINE

## 2023-05-19 PROCEDURE — 87428 SARSCOV & INF VIR A&B AG IA: CPT | Performed by: FAMILY MEDICINE

## 2023-05-19 PROCEDURE — 1159F MED LIST DOCD IN RCRD: CPT | Performed by: FAMILY MEDICINE

## 2023-05-19 PROCEDURE — 81003 URINALYSIS AUTO W/O SCOPE: CPT | Performed by: FAMILY MEDICINE

## 2023-05-19 PROCEDURE — 99213 OFFICE O/P EST LOW 20 MIN: CPT | Performed by: FAMILY MEDICINE

## 2023-05-19 RX ORDER — AMOXICILLIN AND CLAVULANATE POTASSIUM 875; 125 MG/1; MG/1
1 TABLET, FILM COATED ORAL 2 TIMES DAILY
Qty: 14 TABLET | Refills: 0 | Status: SHIPPED | OUTPATIENT
Start: 2023-05-19 | End: 2023-05-26

## 2023-05-19 NOTE — PROGRESS NOTES
"Chief Complaint  Cough (X4-5 days), Headache (X4-5 days), Sinusitis (X4-5 days), and Urinary Tract Infection (Burning at time with urination)    Subjective        Lanette Simpson presents to [facility]  Cough  This is a new problem. The current episode started in the past 7 days. Associated symptoms include ear pain, headaches and a sore throat. Pertinent negatives include no fever, shortness of breath or wheezing.   Headache  Sinusitis  This is a new problem. The current episode started in the past 7 days. Associated symptoms include coughing, ear pain, headaches, neck pain, sinus pressure and a sore throat. Pertinent negatives include no shortness of breath.     Headache, facial pressure, earache, eye pain, neck pain. Waking up in morning coughs everything out. Over couple days she has stinging pain with urination intermittently. She has baseline frequent urination. She has used flonase, tylenol for symptoms.     She lost her  last month.           Review of Systems   Constitutional: Positive for fatigue. Negative for fever.   HENT: Positive for ear pain, sinus pressure and sore throat.    Respiratory: Positive for cough. Negative for shortness of breath and wheezing.    Gastrointestinal: Negative for diarrhea, nausea and vomiting.   Musculoskeletal: Positive for neck pain.   Neurological: Positive for headache.     Objective   Vital Signs:  /88   Pulse 98   Ht 160 cm (62.99\")   Wt 79.4 kg (175 lb)   SpO2 98%   BMI 31.01 kg/m²   Estimated body mass index is 31.01 kg/m² as calculated from the following:    Height as of this encounter: 160 cm (62.99\").    Weight as of this encounter: 79.4 kg (175 lb).             Physical Exam  Vitals reviewed.   Constitutional:       General: She is not in acute distress.     Appearance: She is ill-appearing. She is not toxic-appearing or diaphoretic.   HENT:      Right Ear: Tympanic membrane and ear canal normal.      Left Ear: " Tympanic membrane and ear canal normal.      Nose:      Right Turbinates: Swollen ( erythmea).      Left Turbinates: Swollen ( erythema).      Right Sinus: Maxillary sinus tenderness and frontal sinus tenderness present.      Left Sinus: Frontal sinus tenderness present. No maxillary sinus tenderness.      Mouth/Throat:      Pharynx: Oropharyngeal exudate ( clear PND) and posterior oropharyngeal erythema present. No pharyngeal swelling or uvula swelling.      Tonsils: No tonsillar abscesses.   Cardiovascular:      Rate and Rhythm: Normal rate and regular rhythm.   Pulmonary:      Effort: Pulmonary effort is normal.      Breath sounds: Normal breath sounds.   Lymphadenopathy:      Head:      Right side of head: No submandibular, tonsillar, preauricular or posterior auricular adenopathy.      Left side of head: No submandibular, tonsillar, preauricular or posterior auricular adenopathy.      Cervical: No cervical adenopathy.   Neurological:      Mental Status: She is alert.        Result Review :          Results for orders placed or performed in visit on 05/19/23   POCT SARS-CoV-2 Antigen ARSENIO + Flu    Specimen: Swab   Result Value Ref Range    SARS Antigen Not Detected Not Detected, Presumptive Negative    Influenza A Antigen ARSENIO Not Detected Not Detected    Influenza B Antigen ARSENIO Not Detected Not Detected    Internal Control Passed Passed    Lot Number [id number]     Expiration Date 03/23/24    POCT urinalysis dipstick, automated    Specimen: Urine   Result Value Ref Range    Color Yellow Yellow, Straw, Dark Yellow, Kelly    Clarity, UA Clear Clear    Specific Gravity  1.015 1.005 - 1.030    pH, Urine 6.0 5.0 - 8.0    Leukocytes Negative Negative    Nitrite, UA Negative Negative    Protein, POC Trace (A) Negative mg/dL    Glucose, UA Negative Negative mg/dL    Ketones, UA Negative Negative    Urobilinogen, UA Normal Normal, 0.2 E.U./dL    Bilirubin Negative Negative    Blood, UA Trace (A) Negative    Lot Number  [id number]     Expiration Date 03/25/24                 Assessment and Plan   Diagnoses and all orders for this visit:    1. Acute non-recurrent pansinusitis (Primary)  -     POCT SARS-CoV-2 Antigen ARSENIO + Flu  -     amoxicillin-clavulanate (Augmentin) 875-125 MG per tablet; Take 1 tablet by mouth 2 (Two) Times a Day for 7 days.  Dispense: 14 tablet; Refill: 0    2. Dysuria  -     POCT urinalysis dipstick, automated    Antibiotics for sinus infection.  Recommend rest and increase hydration.  No signs of UTI.         Follow Up   Return if symptoms worsen or fail to improve.  Patient was given instructions and counseling regarding her condition or for health maintenance advice. Please see specific information pulled into the AVS if appropriate.     Electronically signed by Janette Fontana MD, 05/19/23, 1:24 PM EDT.

## 2023-07-26 DIAGNOSIS — I35.0 NONRHEUMATIC AORTIC VALVE STENOSIS: Primary | ICD-10-CM

## 2023-10-11 RX ORDER — OMEPRAZOLE 20 MG/1
20 CAPSULE, DELAYED RELEASE ORAL
Qty: 90 CAPSULE | Refills: 1 | Status: SHIPPED | OUTPATIENT
Start: 2023-10-11

## 2023-11-06 ENCOUNTER — HOSPITAL ENCOUNTER (OUTPATIENT)
Dept: CT IMAGING | Facility: HOSPITAL | Age: 73
Discharge: HOME | End: 2023-11-06
Admitting: NURSE PRACTITIONER
Payer: MEDICARE

## 2023-11-06 ENCOUNTER — LAB (OUTPATIENT)
Dept: LAB | Facility: HOSPITAL | Age: 73
End: 2023-11-06
Payer: MEDICARE

## 2023-11-06 DIAGNOSIS — C83.31 DIFFUSE LARGE B-CELL LYMPHOMA OF LYMPH NODES OF HEAD: ICD-10-CM

## 2023-11-06 LAB
ALBUMIN SERPL-MCNC: 4.3 G/DL (ref 3.5–5.2)
ALBUMIN/GLOB SERPL: 1.7 G/DL
ALP SERPL-CCNC: 65 U/L (ref 39–117)
ALT SERPL W P-5'-P-CCNC: 26 U/L (ref 1–33)
ANION GAP SERPL CALCULATED.3IONS-SCNC: 10 MMOL/L (ref 5–15)
AST SERPL-CCNC: 38 U/L (ref 1–32)
BASOPHILS # BLD AUTO: 0.12 10*3/MM3 (ref 0–0.2)
BASOPHILS NFR BLD AUTO: 1.6 % (ref 0–1.5)
BILIRUB SERPL-MCNC: 0.8 MG/DL (ref 0–1.2)
BUN SERPL-MCNC: 12 MG/DL (ref 8–23)
BUN/CREAT SERPL: 15 (ref 7–25)
CALCIUM SPEC-SCNC: 9.2 MG/DL (ref 8.6–10.5)
CHLORIDE SERPL-SCNC: 106 MMOL/L (ref 98–107)
CO2 SERPL-SCNC: 25 MMOL/L (ref 22–29)
CREAT BLDA-MCNC: 0.8 MG/DL (ref 0.6–1.3)
CREAT SERPL-MCNC: 0.8 MG/DL (ref 0.57–1)
DEPRECATED RDW RBC AUTO: 54.1 FL (ref 37–54)
EGFRCR SERPLBLD CKD-EPI 2021: 77.4 ML/MIN/1.73
EOSINOPHIL # BLD AUTO: 0.3 10*3/MM3 (ref 0–0.4)
EOSINOPHIL NFR BLD AUTO: 3.9 % (ref 0.3–6.2)
ERYTHROCYTE [DISTWIDTH] IN BLOOD BY AUTOMATED COUNT: 15.5 % (ref 12.3–15.4)
GLOBULIN UR ELPH-MCNC: 2.6 GM/DL
GLUCOSE SERPL-MCNC: 81 MG/DL (ref 65–99)
HCT VFR BLD AUTO: 50.7 % (ref 34–46.6)
HGB BLD-MCNC: 16.6 G/DL (ref 12–15.9)
IMM GRANULOCYTES # BLD AUTO: 0.02 10*3/MM3 (ref 0–0.05)
IMM GRANULOCYTES NFR BLD AUTO: 0.3 % (ref 0–0.5)
LYMPHOCYTES # BLD AUTO: 1.71 10*3/MM3 (ref 0.7–3.1)
LYMPHOCYTES NFR BLD AUTO: 22.2 % (ref 19.6–45.3)
MCH RBC QN AUTO: 31 PG (ref 26.6–33)
MCHC RBC AUTO-ENTMCNC: 32.7 G/DL (ref 31.5–35.7)
MCV RBC AUTO: 94.8 FL (ref 79–97)
MONOCYTES # BLD AUTO: 0.58 10*3/MM3 (ref 0.1–0.9)
MONOCYTES NFR BLD AUTO: 7.5 % (ref 5–12)
NEUTROPHILS NFR BLD AUTO: 4.97 10*3/MM3 (ref 1.7–7)
NEUTROPHILS NFR BLD AUTO: 64.5 % (ref 42.7–76)
NRBC BLD AUTO-RTO: 0 /100 WBC (ref 0–0.2)
PLATELET # BLD AUTO: 489 10*3/MM3 (ref 140–450)
PMV BLD AUTO: 9.9 FL (ref 6–12)
POTASSIUM SERPL-SCNC: 3.8 MMOL/L (ref 3.5–5.2)
PROT SERPL-MCNC: 6.9 G/DL (ref 6–8.5)
RBC # BLD AUTO: 5.35 10*6/MM3 (ref 3.77–5.28)
SODIUM SERPL-SCNC: 141 MMOL/L (ref 136–145)
WBC NRBC COR # BLD: 7.7 10*3/MM3 (ref 3.4–10.8)

## 2023-11-06 PROCEDURE — 25510000001 IOPAMIDOL 61 % SOLUTION: Performed by: NURSE PRACTITIONER

## 2023-11-06 PROCEDURE — 82565 ASSAY OF CREATININE: CPT

## 2023-11-06 PROCEDURE — 36415 COLL VENOUS BLD VENIPUNCTURE: CPT

## 2023-11-06 PROCEDURE — 74177 CT ABD & PELVIS W/CONTRAST: CPT

## 2023-11-06 PROCEDURE — 80053 COMPREHEN METABOLIC PANEL: CPT

## 2023-11-06 PROCEDURE — 71260 CT THORAX DX C+: CPT

## 2023-11-06 PROCEDURE — 85025 COMPLETE CBC W/AUTO DIFF WBC: CPT

## 2023-11-06 RX ADMIN — IOPAMIDOL 85 ML: 612 INJECTION, SOLUTION INTRAVENOUS at 11:58

## 2023-11-07 ENCOUNTER — OFFICE VISIT (OUTPATIENT)
Dept: ONCOLOGY | Facility: CLINIC | Age: 73
End: 2023-11-07
Payer: MEDICARE

## 2023-11-07 VITALS
WEIGHT: 175 LBS | RESPIRATION RATE: 18 BRPM | OXYGEN SATURATION: 97 % | DIASTOLIC BLOOD PRESSURE: 63 MMHG | BODY MASS INDEX: 31.01 KG/M2 | HEIGHT: 63 IN | HEART RATE: 84 BPM | TEMPERATURE: 96.9 F | SYSTOLIC BLOOD PRESSURE: 140 MMHG

## 2023-11-07 DIAGNOSIS — C83.31 DIFFUSE LARGE B-CELL LYMPHOMA OF LYMPH NODES OF HEAD: Primary | ICD-10-CM

## 2023-11-07 PROCEDURE — 1126F AMNT PAIN NOTED NONE PRSNT: CPT | Performed by: INTERNAL MEDICINE

## 2023-11-07 PROCEDURE — 99214 OFFICE O/P EST MOD 30 MIN: CPT | Performed by: INTERNAL MEDICINE

## 2023-11-07 NOTE — PROGRESS NOTES
DATE OF VISIT: 11/7/2023    REASON FOR VISIT: Followup for mandible diffuse large B-cell lymphoma     PROBLEM LIST:  1. Mandible diffuse large B-cell lymphoma, stage IIE:  A.  Presented with gum pain and swelling  B.  CT facial bones done on February 26, 2019 revealed 2.3 cm lucency in the anterior mandible  C.  Status post biopsy done on March 7, 2019 consistent with diffuse large B-cell lymphoma  D.  Bone marrow biopsy done on March 25, 2019 no evidence of lymphoma involvement.  E.  Whole body PET scan done on March 28, 2019 revealed hypermetabolic activity in the right curtis-mandible as well as jugular cervical chains hypermetabolic lymphadenopathy level 2 and level 3.  F. Started chemotherapy with R-CHOP April 2019,status post 6 cycles, completed July 22, 2019.  2.  Osteoarthritis  3.  Active bladder  4.  Heartburn    HISTORY OF PRESENT ILLNESS: The patient is a very pleasant 73 y.o. 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  Staging workup revealed stage IIE disease.  Patient was started on definitive chemotherapy with R CHOP April 8, 2019.  The patient completed cycle #6 June 22, 2019.  The patient is here today for scheduled follow-up visit.    SUBJECTIVE: The patient is here today by herself.  She is doing fairly well.  Denies any fever chills or night sweats.  She lost her  earlier on this year to kidney and heart failure.  She is grieving appropriately.    Past History:  Medical History: has a past medical history of Age-related osteoporosis without current pathological fracture (03/07/2022), Arthritis, Colon polyp (12/05/2013), Colon, diverticulosis, Degenerative arthritis of spine, Diffuse large B cell lymphoma (2019), Heart murmur (7-28-22), Nonalcoholic hepatosteatosis, OAB (overactive bladder), Osteopenia, and Wears glasses.   Surgical History: has a past surgical history that includes Total abdominal hysterectomy; Bone marrow  "biopsy; Bone biopsy; Colonoscopy (2013); and Venous Access Device (Port) (N/A, 4/4/2019).   Family History: family history includes Breast cancer in her maternal grandmother and paternal grandmother; Diabetes in her brother, brother, father, and sister; Glaucoma in her paternal grandfather; Gout in her brother; Heart disease in her brother, father, and mother; Hyperlipidemia in her mother; Hypertension in her brother, brother, mother, and sister; Pancreatic cancer in her mother; Prostate cancer in her father; Stroke in her maternal grandfather.   Social History: reports that she has never smoked. She has never used smokeless tobacco. She reports that she does not drink alcohol and does not use drugs.    (Not in a hospital admission)     Allergies: Patient has no known allergies.    Review of Systems   Constitutional:  Positive for fatigue.   Cardiovascular: Negative.    Gastrointestinal: Negative.    Musculoskeletal:  Positive for arthralgias.        Bilateral knee pain.       PHYSICAL EXAMINATION:   Ht 160 cm (62.99\")   BMI 31.01 kg/m²    There were no vitals filed for this visit.       ECOG Performance Status: 1 - Symptomatic but completely ambulatory  General Appearance:  alert, cooperative, no apparent distress and appears stated age   Lungs:   Clear to auscultation bilaterally; respirations regular, even, and unlabored bilaterally   Heart:  Regular rate and rhythm, no murmurs appreciated   Abdomen:   Soft, non-tender, non-distended, and no organomegaly     Hospital Outpatient Visit on 11/06/2023   Component Date Value Ref Range Status    Creatinine 11/06/2023 0.80  0.60 - 1.30 mg/dL Final    Serial Number: [id number]  [id number]   Lab on 11/06/2023   Component Date Value Ref Range Status    Glucose 11/06/2023 81  65 - 99 mg/dL Final    BUN 11/06/2023 12  8 - 23 mg/dL Final    Creatinine 11/06/2023 0.80  0.57 - 1.00 mg/dL Final    Sodium 11/06/2023 141  136 - 145 mmol/L Final    Potassium 11/06/2023 3.8  3.5 - " 5.2 mmol/L Final    Chloride 11/06/2023 106  98 - 107 mmol/L Final    CO2 11/06/2023 25.0  22.0 - 29.0 mmol/L Final    Calcium 11/06/2023 9.2  8.6 - 10.5 mg/dL Final    Total Protein 11/06/2023 6.9  6.0 - 8.5 g/dL Final    Albumin 11/06/2023 4.3  3.5 - 5.2 g/dL Final    ALT (SGPT) 11/06/2023 26  1 - 33 U/L Final    AST (SGOT) 11/06/2023 38 (H)  1 - 32 U/L Final    Alkaline Phosphatase 11/06/2023 65  39 - 117 U/L Final    Total Bilirubin 11/06/2023 0.8  0.0 - 1.2 mg/dL Final    Globulin 11/06/2023 2.6  gm/dL Final    Calculated Result    A/G Ratio 11/06/2023 1.7  g/dL Final    BUN/Creatinine Ratio 11/06/2023 15.0  7.0 - 25.0 Final    Anion Gap 11/06/2023 10.0  5.0 - 15.0 mmol/L Final    eGFR 11/06/2023 77.4  >60.0 mL/min/1.73 Final    WBC 11/06/2023 7.70  3.40 - 10.80 10*3/mm3 Final    RBC 11/06/2023 5.35 (H)  3.77 - 5.28 10*6/mm3 Final    Hemoglobin 11/06/2023 16.6 (H)  12.0 - 15.9 g/dL Final    Hematocrit 11/06/2023 50.7 (H)  34.0 - 46.6 % Final    MCV 11/06/2023 94.8  79.0 - 97.0 fL Final    MCH 11/06/2023 31.0  26.6 - 33.0 pg Final    MCHC 11/06/2023 32.7  31.5 - 35.7 g/dL Final    RDW 11/06/2023 15.5 (H)  12.3 - 15.4 % Final    RDW-SD 11/06/2023 54.1 (H)  37.0 - 54.0 fl Final    MPV 11/06/2023 9.9  6.0 - 12.0 fL Final    Platelets 11/06/2023 489 (H)  140 - 450 10*3/mm3 Final    Neutrophil % 11/06/2023 64.5  42.7 - 76.0 % Final    Lymphocyte % 11/06/2023 22.2  19.6 - 45.3 % Final    Monocyte % 11/06/2023 7.5  5.0 - 12.0 % Final    Eosinophil % 11/06/2023 3.9  0.3 - 6.2 % Final    Basophil % 11/06/2023 1.6 (H)  0.0 - 1.5 % Final    Immature Grans % 11/06/2023 0.3  0.0 - 0.5 % Final    Neutrophils, Absolute 11/06/2023 4.97  1.70 - 7.00 10*3/mm3 Final    Lymphocytes, Absolute 11/06/2023 1.71  0.70 - 3.10 10*3/mm3 Final    Monocytes, Absolute 11/06/2023 0.58  0.10 - 0.90 10*3/mm3 Final    Eosinophils, Absolute 11/06/2023 0.30  0.00 - 0.40 10*3/mm3 Final    Basophils, Absolute 11/06/2023 0.12  0.00 - 0.20 10*3/mm3  Final    Immature Grans, Absolute 11/06/2023 0.02  0.00 - 0.05 10*3/mm3 Final    nRBC 11/06/2023 0.0  0.0 - 0.2 /100 WBC Final        CT Chest With Contrast Diagnostic    Result Date: 11/6/2023  Narrative: CT ABDOMEN PELVIS W CONTRAST, CT CHEST W CONTRAST DIAGNOSTIC Date of Exam: 11/6/2023 11:27 AM EST Indication: restaging diffuse large b cell lymphoma. Comparison: CT chest, abdomen and pelvis dated 11/2/2022 Technique: Axial CT images were obtained of the abdomen and pelvis following the uneventful intravenous administration of 85 mL Isovue-300. Reconstructed coronal and sagittal images were also obtained. Automated exposure control and iterative construction methods were used. Findings: Hilum and Mediastinum: No pathologically enlarged lymph nodes.  Normal heart size.   No pericardial effusion.  Unremarkable thoracic aorta and pulmonary arteries. Lung Parenchyma and Pleura: No focal consolidations. There is interval development of two nonspecific pulmonary nodules measuring 4 mm in the left lower lobe adjacent to each other (image 43 series 4). The rest of the lungs are unremarkable.  No endobronchial lesions.  No significant pleural effusions. Liver:Liver is normal in size and CT density. No focal lesions. Multiple calcified granulomas There is a tiny hiatal hernia. Biliary/Gallbladder: The gallbladder is without evidence of radiopaque stones. The biliary tree is nondilated. Spleen:Spleen is normal in size and CT density. Multiple calcified granulomas Pancreas: Pancreas shows homogeneous density. There is no evidence of pancreatic mass or peripancreatic fluid. Kidneys: Kidneys are normal in size. There are no stones or hydronephrosis. Adrenals: Adrenal glands are unremarkable. Retroperitoneal/Lymph Nodes/Vasculature: No retroperitoneal adenopathy is identified by size criteria. Gastrointestinal/Mesentery: The bowel loops are non-dilated without definite wall thickening or mass. The appendix appears within normal  limits. No evidence of obstruction. No free air. Bladder: The bladder is unremarkable Hysterectomy changes   Bony Structures:  Visualized bony structures are consistent with the patient's age. Stable small sclerotic bone lesion left proximal femur may represent an enostosis. Multilevel disc bulges and lumbar spondylosis.     Impression: Impression: 1. Interval development of two 4 mm nonspecific pulmonary nodules in the left lower lobe. Close follow-up recommended 2. No evidence of pathologic lymphadenopathy by size criteria in the chest, abdomen or pelvis 3. Additional stable nonemergent findings as above Electronically Signed: Maxwell Mackay MD  11/6/2023 1:10 PM EST  Workstation ID: [id number]    CT Abdomen Pelvis With Contrast    Result Date: 11/6/2023  Narrative: CT ABDOMEN PELVIS W CONTRAST, CT CHEST W CONTRAST DIAGNOSTIC Date of Exam: 11/6/2023 11:27 AM EST Indication: restaging diffuse large b cell lymphoma. Comparison: CT chest, abdomen and pelvis dated 11/2/2022 Technique: Axial CT images were obtained of the abdomen and pelvis following the uneventful intravenous administration of 85 mL Isovue-300. Reconstructed coronal and sagittal images were also obtained. Automated exposure control and iterative construction methods were used. Findings: Hilum and Mediastinum: No pathologically enlarged lymph nodes.  Normal heart size.   No pericardial effusion.  Unremarkable thoracic aorta and pulmonary arteries. Lung Parenchyma and Pleura: No focal consolidations. There is interval development of two nonspecific pulmonary nodules measuring 4 mm in the left lower lobe adjacent to each other (image 43 series 4). The rest of the lungs are unremarkable.  No endobronchial lesions.  No significant pleural effusions. Liver:Liver is normal in size and CT density. No focal lesions. Multiple calcified granulomas There is a tiny hiatal hernia. Biliary/Gallbladder: The gallbladder is without evidence of radiopaque stones. The  biliary tree is nondilated. Spleen:Spleen is normal in size and CT density. Multiple calcified granulomas Pancreas: Pancreas shows homogeneous density. There is no evidence of pancreatic mass or peripancreatic fluid. Kidneys: Kidneys are normal in size. There are no stones or hydronephrosis. Adrenals: Adrenal glands are unremarkable. Retroperitoneal/Lymph Nodes/Vasculature: No retroperitoneal adenopathy is identified by size criteria. Gastrointestinal/Mesentery: The bowel loops are non-dilated without definite wall thickening or mass. The appendix appears within normal limits. No evidence of obstruction. No free air. Bladder: The bladder is unremarkable Hysterectomy changes   Bony Structures:  Visualized bony structures are consistent with the patient's age. Stable small sclerotic bone lesion left proximal femur may represent an enostosis. Multilevel disc bulges and lumbar spondylosis.     Impression: Impression: 1. Interval development of two 4 mm nonspecific pulmonary nodules in the left lower lobe. Close follow-up recommended 2. No evidence of pathologic lymphadenopathy by size criteria in the chest, abdomen or pelvis 3. Additional stable nonemergent findings as above Electronically Signed: Maxwell Mackay MD  11/6/2023 1:10 PM EST  Workstation ID: [id number]       ASSESSMENT: The patient is a very pleasant 73 y.o. female  with mandible diffuse large B-cell lymphoma.    PLAN:  1.  Diffuse large B-cell lymphoma:  A.  I did go over the scan results with the patient from November 6, 2023.  I reassured the patient she does not have any evidence of relapsed lymphoma.  She will need to have 1 year follow-up study which be due November 2024.  B.  I did go over the blood results with the patient she did have high hemoglobin hematocrit.  Normal kidney function and electrolytes.    2.  Heartburn:  A.  She will continue omeprazole 20 mg daily for acid reflux.     3.  Osteoporosis:  A.  The patient will continue calcium with  vitamin D as well as Fosamax weekly.    4.  Nonspecific 4 mm left lower lobe lung nodules:  A.  I will do 6-month follow-up CT chest without contrast.    5.  Polycythemia:  A.  I am suspecting this is induced by dehydration.  B.  I will repeat her labs on return.    FOLLOW UP: 6 months with labs and CT chest without contrast.     Joan Zuluaga MD  11/7/2023

## 2024-04-08 RX ORDER — OMEPRAZOLE 20 MG/1
20 CAPSULE, DELAYED RELEASE ORAL
Qty: 90 CAPSULE | Refills: 1 | Status: SHIPPED | OUTPATIENT
Start: 2024-04-08

## 2024-05-03 ENCOUNTER — LAB (OUTPATIENT)
Dept: LAB | Facility: HOSPITAL | Age: 74
End: 2024-05-03
Payer: MEDICARE

## 2024-05-03 ENCOUNTER — HOSPITAL ENCOUNTER (OUTPATIENT)
Dept: CT IMAGING | Facility: HOSPITAL | Age: 74
Discharge: HOME | End: 2024-05-03
Payer: MEDICARE

## 2024-05-03 DIAGNOSIS — C83.31 DIFFUSE LARGE B-CELL LYMPHOMA OF LYMPH NODES OF HEAD: ICD-10-CM

## 2024-05-03 LAB
ALBUMIN SERPL-MCNC: 4.2 G/DL (ref 3.5–5.2)
ALBUMIN/GLOB SERPL: 1.4 G/DL
ALP SERPL-CCNC: 73 U/L (ref 39–117)
ALT SERPL W P-5'-P-CCNC: 24 U/L (ref 1–33)
ANION GAP SERPL CALCULATED.3IONS-SCNC: 9 MMOL/L (ref 5–15)
AST SERPL-CCNC: 33 U/L (ref 1–32)
BASOPHILS # BLD AUTO: 0.15 10*3/MM3 (ref 0–0.2)
BASOPHILS NFR BLD AUTO: 1.9 % (ref 0–1.5)
BILIRUB SERPL-MCNC: 0.8 MG/DL (ref 0–1.2)
BUN SERPL-MCNC: 16 MG/DL (ref 8–23)
BUN/CREAT SERPL: 18.2 (ref 7–25)
CALCIUM SPEC-SCNC: 9.5 MG/DL (ref 8.6–10.5)
CHLORIDE SERPL-SCNC: 104 MMOL/L (ref 98–107)
CO2 SERPL-SCNC: 26 MMOL/L (ref 22–29)
CREAT SERPL-MCNC: 0.88 MG/DL (ref 0.57–1)
DEPRECATED RDW RBC AUTO: 49.6 FL (ref 37–54)
EGFRCR SERPLBLD CKD-EPI 2021: 69.1 ML/MIN/1.73
EOSINOPHIL # BLD AUTO: 0.33 10*3/MM3 (ref 0–0.4)
EOSINOPHIL NFR BLD AUTO: 4.1 % (ref 0.3–6.2)
ERYTHROCYTE [DISTWIDTH] IN BLOOD BY AUTOMATED COUNT: 16.7 % (ref 12.3–15.4)
GLOBULIN UR ELPH-MCNC: 3.1 GM/DL
GLUCOSE SERPL-MCNC: 93 MG/DL (ref 65–99)
HCT VFR BLD AUTO: 54.8 % (ref 34–46.6)
HGB BLD-MCNC: 17.9 G/DL (ref 12–15.9)
IMM GRANULOCYTES # BLD AUTO: 0.02 10*3/MM3 (ref 0–0.05)
IMM GRANULOCYTES NFR BLD AUTO: 0.3 % (ref 0–0.5)
LYMPHOCYTES # BLD AUTO: 1.82 10*3/MM3 (ref 0.7–3.1)
LYMPHOCYTES NFR BLD AUTO: 22.8 % (ref 19.6–45.3)
MCH RBC QN AUTO: 28.7 PG (ref 26.6–33)
MCHC RBC AUTO-ENTMCNC: 32.7 G/DL (ref 31.5–35.7)
MCV RBC AUTO: 88 FL (ref 79–97)
MONOCYTES # BLD AUTO: 0.61 10*3/MM3 (ref 0.1–0.9)
MONOCYTES NFR BLD AUTO: 7.6 % (ref 5–12)
NEUTROPHILS NFR BLD AUTO: 5.05 10*3/MM3 (ref 1.7–7)
NEUTROPHILS NFR BLD AUTO: 63.3 % (ref 42.7–76)
NRBC BLD AUTO-RTO: 0 /100 WBC (ref 0–0.2)
PLATELET # BLD AUTO: 460 10*3/MM3 (ref 140–450)
PMV BLD AUTO: 10 FL (ref 6–12)
POTASSIUM SERPL-SCNC: 4.4 MMOL/L (ref 3.5–5.2)
PROT SERPL-MCNC: 7.3 G/DL (ref 6–8.5)
RBC # BLD AUTO: 6.23 10*6/MM3 (ref 3.77–5.28)
SODIUM SERPL-SCNC: 139 MMOL/L (ref 136–145)
WBC NRBC COR # BLD AUTO: 7.98 10*3/MM3 (ref 3.4–10.8)

## 2024-05-03 PROCEDURE — 71250 CT THORAX DX C-: CPT

## 2024-05-03 PROCEDURE — 36415 COLL VENOUS BLD VENIPUNCTURE: CPT

## 2024-05-03 PROCEDURE — 80053 COMPREHEN METABOLIC PANEL: CPT

## 2024-05-03 PROCEDURE — 85025 COMPLETE CBC W/AUTO DIFF WBC: CPT

## 2024-05-13 ENCOUNTER — OFFICE VISIT (OUTPATIENT)
Dept: ONCOLOGY | Facility: CLINIC | Age: 74
End: 2024-05-13
Payer: MEDICARE

## 2024-05-13 VITALS
WEIGHT: 182 LBS | BODY MASS INDEX: 32.25 KG/M2 | OXYGEN SATURATION: 98 % | DIASTOLIC BLOOD PRESSURE: 65 MMHG | HEART RATE: 88 BPM | TEMPERATURE: 96.8 F | RESPIRATION RATE: 18 BRPM | HEIGHT: 63 IN | SYSTOLIC BLOOD PRESSURE: 142 MMHG

## 2024-05-13 DIAGNOSIS — D45 POLYCYTHEMIA VERA: ICD-10-CM

## 2024-05-13 DIAGNOSIS — C83.31 DIFFUSE LARGE B-CELL LYMPHOMA OF LYMPH NODES OF HEAD: Primary | ICD-10-CM

## 2024-05-13 PROCEDURE — 1160F RVW MEDS BY RX/DR IN RCRD: CPT | Performed by: NURSE PRACTITIONER

## 2024-05-13 PROCEDURE — 1159F MED LIST DOCD IN RCRD: CPT | Performed by: NURSE PRACTITIONER

## 2024-05-13 PROCEDURE — 1126F AMNT PAIN NOTED NONE PRSNT: CPT | Performed by: NURSE PRACTITIONER

## 2024-05-13 PROCEDURE — 99214 OFFICE O/P EST MOD 30 MIN: CPT | Performed by: NURSE PRACTITIONER

## 2024-05-13 RX ORDER — CETIRIZINE HYDROCHLORIDE 5 MG/1
1 TABLET ORAL DAILY
COMMUNITY
Start: 2024-04-25 | End: 2024-05-25

## 2024-05-13 RX ORDER — ONDANSETRON HYDROCHLORIDE 8 MG/1
8 TABLET, FILM COATED ORAL 3 TIMES DAILY PRN
Qty: 30 TABLET | Refills: 5 | Status: SHIPPED | OUTPATIENT
Start: 2024-05-13

## 2024-05-13 RX ORDER — FUROSEMIDE 20 MG/1
TABLET ORAL
COMMUNITY
Start: 2024-05-09

## 2024-05-13 RX ORDER — SODIUM CHLORIDE 9 MG/ML
250 INJECTION, SOLUTION INTRAVENOUS ONCE
OUTPATIENT
Start: 2024-05-21

## 2024-05-13 RX ORDER — ASPIRIN 81 MG/1
81 TABLET ORAL DAILY
COMMUNITY
Start: 2023-12-13

## 2024-05-13 NOTE — PROGRESS NOTES
DATE OF VISIT: 5/13/2024    REASON FOR VISIT: Followup for mandible diffuse large B-cell lymphoma     PROBLEM LIST:  1. Mandible diffuse large B-cell lymphoma, stage IIE:  A.  Presented with gum pain and swelling  B.  CT facial bones done on February 26, 2019 revealed 2.3 cm lucency in the anterior mandible  C.  Status post biopsy done on March 7, 2019 consistent with diffuse large B-cell lymphoma  D.  Bone marrow biopsy done on March 25, 2019 no evidence of lymphoma involvement.  E.  Whole body PET scan done on March 28, 2019 revealed hypermetabolic activity in the right curtis-mandible as well as jugular cervical chains hypermetabolic lymphadenopathy level 2 and level 3.  F. Started chemotherapy with R-CHOP April 2019,status post 6 cycles, completed July 22, 2019.  2.  Osteoarthritis  3.  Active bladder  4.  Heartburn    HISTORY OF PRESENT ILLNESS: The patient is a very pleasant 74 y.o. 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  Staging workup revealed stage IIE disease.  Patient was started on definitive chemotherapy with R CHOP April 8, 2019.  The patient completed cycle #6 June 22, 2019.  The patient is here today for scheduled follow-up visit.    SUBJECTIVE: The patient is here today by herself. She has been doing fairly well. She has had some stress related to raising her granddaughter as well as still coping with the loss of her  a year ago. She is staying active. She denies fevers, chills, night sweats, or unexplained weight loss. She has concerns about her blood work as well as some results from labs done at . She was started on baby aspirin daily.     Past History:  Medical History: has a past medical history of Age-related osteoporosis without current pathological fracture (03/07/2022), Arthritis, Colon polyp (12/05/2013), Colon, diverticulosis, Degenerative arthritis of spine, Diffuse large B cell lymphoma (2019), Heart murmur  "(7-28-22), Nonalcoholic hepatosteatosis, OAB (overactive bladder), Osteopenia, and Wears glasses.   Surgical History: has a past surgical history that includes Total abdominal hysterectomy; Bone marrow biopsy; Bone biopsy; Colonoscopy (2013); and Venous Access Device (Port) (N/A, 4/4/2019).   Family History: family history includes Breast cancer in her maternal grandmother and paternal grandmother; Diabetes in her brother, brother, father, and sister; Glaucoma in her paternal grandfather; Gout in her brother; Heart disease in her brother, father, and mother; Hyperlipidemia in her mother; Hypertension in her brother, brother, mother, and sister; Pancreatic cancer in her mother; Prostate cancer in her father; Stroke in her maternal grandfather.   Social History: reports that she has never smoked. She has never used smokeless tobacco. She reports that she does not drink alcohol and does not use drugs.    (Not in a hospital admission)     Allergies: Patient has no known allergies.    Review of Systems   Constitutional:  Positive for fatigue.   Respiratory:          Snoring       PHYSICAL EXAMINATION:   /65   Pulse 88   Temp 96.8 °F (36 °C)   Resp 18   Ht 160 cm (62.99\")   Wt 82.6 kg (182 lb)   SpO2 98%   BMI 32.25 kg/m²    Pain Score    05/13/24 1027   PainSc: 0-No pain          ECOG Performance Status: 0 - Asymptomatic  General Appearance:  alert, cooperative, no apparent distress and appears stated age   Lungs:   Clear to auscultation bilaterally; respirations regular, even, and unlabored bilaterally   Heart:  Regular rate and rhythm, no murmurs appreciated   Abdomen:   Soft, non-tender, non-distended, and no organomegaly     Lab on 05/03/2024   Component Date Value Ref Range Status    Glucose 05/03/2024 93  65 - 99 mg/dL Final    BUN 05/03/2024 16  8 - 23 mg/dL Final    Creatinine 05/03/2024 0.88  0.57 - 1.00 mg/dL Final    Sodium 05/03/2024 139  136 - 145 mmol/L Final    Potassium 05/03/2024 4.4  3.5 - " 5.2 mmol/L Final    Chloride 05/03/2024 104  98 - 107 mmol/L Final    CO2 05/03/2024 26.0  22.0 - 29.0 mmol/L Final    Calcium 05/03/2024 9.5  8.6 - 10.5 mg/dL Final    Total Protein 05/03/2024 7.3  6.0 - 8.5 g/dL Final    Albumin 05/03/2024 4.2  3.5 - 5.2 g/dL Final    ALT (SGPT) 05/03/2024 24  1 - 33 U/L Final    AST (SGOT) 05/03/2024 33 (H)  1 - 32 U/L Final    Alkaline Phosphatase 05/03/2024 73  39 - 117 U/L Final    Total Bilirubin 05/03/2024 0.8  0.0 - 1.2 mg/dL Final    Globulin 05/03/2024 3.1  gm/dL Final    Calculated Result    A/G Ratio 05/03/2024 1.4  g/dL Final    BUN/Creatinine Ratio 05/03/2024 18.2  7.0 - 25.0 Final    Anion Gap 05/03/2024 9.0  5.0 - 15.0 mmol/L Final    eGFR 05/03/2024 69.1  >60.0 mL/min/1.73 Final    WBC 05/03/2024 7.98  3.40 - 10.80 10*3/mm3 Final    RBC 05/03/2024 6.23 (H)  3.77 - 5.28 10*6/mm3 Final    Hemoglobin 05/03/2024 17.9 (H)  12.0 - 15.9 g/dL Final    Hematocrit 05/03/2024 54.8 (H)  34.0 - 46.6 % Final    MCV 05/03/2024 88.0  79.0 - 97.0 fL Final    MCH 05/03/2024 28.7  26.6 - 33.0 pg Final    MCHC 05/03/2024 32.7  31.5 - 35.7 g/dL Final    RDW 05/03/2024 16.7 (H)  12.3 - 15.4 % Final    RDW-SD 05/03/2024 49.6  37.0 - 54.0 fl Final    MPV 05/03/2024 10.0  6.0 - 12.0 fL Final    Platelets 05/03/2024 460 (H)  140 - 450 10*3/mm3 Final    Neutrophil % 05/03/2024 63.3  42.7 - 76.0 % Final    Lymphocyte % 05/03/2024 22.8  19.6 - 45.3 % Final    Monocyte % 05/03/2024 7.6  5.0 - 12.0 % Final    Eosinophil % 05/03/2024 4.1  0.3 - 6.2 % Final    Basophil % 05/03/2024 1.9 (H)  0.0 - 1.5 % Final    Immature Grans % 05/03/2024 0.3  0.0 - 0.5 % Final    Neutrophils, Absolute 05/03/2024 5.05  1.70 - 7.00 10*3/mm3 Final    Lymphocytes, Absolute 05/03/2024 1.82  0.70 - 3.10 10*3/mm3 Final    Monocytes, Absolute 05/03/2024 0.61  0.10 - 0.90 10*3/mm3 Final    Eosinophils, Absolute 05/03/2024 0.33  0.00 - 0.40 10*3/mm3 Final    Basophils, Absolute 05/03/2024 0.15  0.00 - 0.20 10*3/mm3 Final     Immature Grans, Absolute 05/03/2024 0.02  0.00 - 0.05 10*3/mm3 Final    nRBC 05/03/2024 0.0  0.0 - 0.2 /100 WBC Final        CT Chest Without Contrast Diagnostic    Result Date: 5/6/2024  Narrative: CT CHEST WO CONTRAST DIAGNOSTIC Date of Exam: 5/3/2024 10:49 AM EDT Indication: f/u scans. Comparison: 11/6/2023 Technique: Axial CT images were obtained of the chest without contrast administration.  Reconstructed coronal and sagittal images were also obtained. Automated exposure control and iterative construction methods were used. Findings: Prior CT scan report noted to 4 mm nonspecific left lower lobe pulm nodules. No evidence of lymphoma. On today's exam, the previously noted 2 adjacent faint nodular densities in the left lower lobe are no longer present, apparently very small foci of inflammation. The lungs now appear clear of active disease. There is no pleural effusion. Small hernia or  eventration of the posterior left hemidiaphragm is stable. Images of the mediastinum show no evidence of significant mediastinal, hilar, axillary or lower cervical lymphadenopathy. There is little if any coronary artery calcification. Mitral annular and aortic valve calcification are noted. There appears to be a  stable 15 mm left lower pole thyroid nodule, stable from the prior exam as well as 3/20/2019 PET/CT scan. Included images of the upper abdomen show multiple granulomatous calcifications in the liver and spleen, and fatty liver change. Included portions of the pancreas gallbladder, adrenal glands, and kidneys appear within normal limits. No upper abdominal adenopathy is seen. Bony structures appear to be intact.     Impression: Impression: 1. Previously noted small nodular densities in the left lower lobe have resolved. No new chest disease. 2. Subtle 15 mm left lower pole thyroid nodule, which appears stable back to at least 3/28/2019 PET/CT,  scan. Electronically Signed: Archie East MD  5/6/2024 5:09 PM EDT   Workstation ID: [id number]       ASSESSMENT: The patient is a very pleasant 74 y.o. female  with mandible diffuse large B-cell lymphoma.    PLAN:  1.  Diffuse large B-cell lymphoma:  A.  I reviewed the CT chest results from 5/3/2024 with the patient. The previously noted nodular density in the left lower lobe have resolved with no evidence of relapsed lymphoma. She has a stable left sided thyroid nodule.   B. We will plan repeat her imaging studies in 6 months, with CT chest, abdomen, and pelvis, ordered prior to return. If her scans are stable, we will do annual follow up.     2.  Heartburn:  A.  She will continue omeprazole 20 mg daily for acid reflux.     3.  Osteoporosis:  A.  The patient will continue calcium with vitamin D as well as Fosamax weekly.    4.  Nonspecific 4 mm left lower lobe lung nodules:  A. I reassured the patient that these areas have resolved on most recent CT chest.     5.  Primary polycythemia, JAK2 positive:  A.  I reviewed the lab results from today with the patient. Her hemoglobin is up to 17.9 with hematocrit of 54.8 with normal WBC and stable thrombocytosis with platelet count of 460,000.   B. The patient had work up for polycythemia don through  that revealed a JAK2 mutation.   C. We will start her on therapeutic phlebotomy every 4 weeks with removal of 400 ml of blood to target hematocrit less than 45%.  D. We will go ahead and refer her to sleep medicine for sleep study as the patient snores and does have some daytime fatigue. This may contribute to her polycythemia if she has underlying sleep apnea, however we reviewed that this is primarily driven by JAK2 mutation. .   E. She will continue aspirin 81 mg daily.   F. We will start her on Hydrea 500 mg daily to target hematocrit less than 45%. We will see her back in 6 weeks with repeat CBC. Her age is a risk factor for thromboembolic events.       FOLLOW UP: 6 weeks with repeat labs.      Migdalia Blanca, APRN  5/13/2024

## 2024-05-14 ENCOUNTER — SPECIALTY PHARMACY (OUTPATIENT)
Dept: ONCOLOGY | Facility: HOSPITAL | Age: 74
End: 2024-05-14
Payer: MEDICARE

## 2024-05-14 ENCOUNTER — TELEPHONE (OUTPATIENT)
Dept: ONCOLOGY | Facility: CLINIC | Age: 74
End: 2024-05-14
Payer: MEDICARE

## 2024-05-14 DIAGNOSIS — C83.31 DIFFUSE LARGE B-CELL LYMPHOMA OF LYMPH NODES OF HEAD: Primary | ICD-10-CM

## 2024-05-14 RX ORDER — HYDROXYUREA 500 MG/1
500 CAPSULE ORAL DAILY
Qty: 28 CAPSULE | Refills: 5 | Status: SHIPPED | OUTPATIENT
Start: 2024-05-20

## 2024-05-14 NOTE — TELEPHONE ENCOUNTER
Caller: Chevy Simpson    Relationship: Self    Best call back number: [phone]    What is the best time to reach you: ANY    Who are you requesting to speak with (clinical staff, provider,  specific staff member): CLINICAL       What was the call regarding: CHEVY IS CALLING STATED THAT SHE THOUGHT SHE WAS SUPPOSE TO START TAKING Hydrea 500 mg , SHE WENT TO THE PHARMACY BUT THEY DID NOT HAVE THAT MEDICATION CALLED IN       PLEASE ADVISE

## 2024-05-14 NOTE — TELEPHONE ENCOUNTER
Called patient back and let her know that Hydrea comes from our specialty pharmacy so we are going to set up a visit with our pharmacist here at the hospital and they will help set up her medication to be delivered to her home or  at the hospital pharmacy. Patient v/u and appreciation.

## 2024-05-14 NOTE — PROGRESS NOTES
Specialty Pharmacy Note - Double Check    Drug: hydroxyurea (Hydrea)  Strength: 500mg  Directions: Take 1 tablet by mouth daily  QTY: 28  RF:5    Released to pharmacy: [facility] [place]    Completed independent double check on medication order/RX.

## 2024-05-14 NOTE — PROGRESS NOTES
Oral Chemotherapy - New Referral    Received a referral from Dr. Zuluaga    Treatment Plan: Hydrea (hydroxyurea)  Start date of treatment planned for: As soon as oral specialty medication is available.  Indication: Primary polycythemia, JAK2 positive  Relevant past treatments:   -starting her on therapeutic phlebotomy every 4 weeks with removal of 400 ml of blood to target hematocrit less than 45%.  -continue aspirin 81mg daily  Is the therapy appropriate based on treatment guidelines and FDA labeling?: yes  Therapeutic Goals: Continue treatment until progression or intolerable toxicity  Patient can self-administer oral medications: Yes    Drug-Drug Interactions: The current medication list was reviewed and there are no relevant drug-drug interactions with the specialty medication.  Medication Allergies: The patient has NKDA  Review of Labs/Dose Adjustments: The patient's most recent labs were reviewed and all are WNL to start treatment at this dose.       A prescription was released to [facility] for   Drug: Hydrea (hydroxyurea)  Strength: 500 mg  Directions: Take 1 capsule by mouth daily  Quantity: 28  Refills: 5    Pharmacy education is not yet scheduled., CCA and consent will be signed at that time.    Shani Grace PharmD, BCOP  Clinical Oncology Pharmacy Specialist  Phone: [phone]      5/14/2024  10:04 EDT

## 2024-05-21 ENCOUNTER — HOSPITAL ENCOUNTER (OUTPATIENT)
Dept: ONCOLOGY | Facility: HOSPITAL | Age: 74
Discharge: HOME | End: 2024-05-21
Payer: MEDICARE

## 2024-05-21 ENCOUNTER — SPECIALTY PHARMACY (OUTPATIENT)
Dept: ONCOLOGY | Facility: HOSPITAL | Age: 74
End: 2024-05-21
Payer: MEDICARE

## 2024-05-21 VITALS
WEIGHT: 185 LBS | HEART RATE: 90 BPM | RESPIRATION RATE: 18 BRPM | TEMPERATURE: 97.8 F | BODY MASS INDEX: 34.04 KG/M2 | SYSTOLIC BLOOD PRESSURE: 142 MMHG | DIASTOLIC BLOOD PRESSURE: 67 MMHG | HEIGHT: 62 IN

## 2024-05-21 DIAGNOSIS — D45 POLYCYTHEMIA VERA: ICD-10-CM

## 2024-05-21 DIAGNOSIS — C83.31 DIFFUSE LARGE B-CELL LYMPHOMA OF LYMPH NODES OF HEAD: Primary | ICD-10-CM

## 2024-05-21 LAB
HCT VFR BLD AUTO: 50.4 % (ref 34–46.6)
HGB BLD-MCNC: 16.8 G/DL (ref 12–15.9)

## 2024-05-21 PROCEDURE — 99195 PHLEBOTOMY: CPT

## 2024-05-21 PROCEDURE — 85018 HEMOGLOBIN: CPT | Performed by: NURSE PRACTITIONER

## 2024-05-21 PROCEDURE — 85014 HEMATOCRIT: CPT | Performed by: NURSE PRACTITIONER

## 2024-05-21 RX ORDER — SODIUM CHLORIDE 9 MG/ML
250 INJECTION, SOLUTION INTRAVENOUS ONCE
OUTPATIENT
Start: 2024-06-21

## 2024-05-21 RX ORDER — SODIUM CHLORIDE 9 MG/ML
250 INJECTION, SOLUTION INTRAVENOUS ONCE
Status: DISCONTINUED | OUTPATIENT
Start: 2024-05-21 | End: 2024-05-22 | Stop reason: HOSPADM

## 2024-05-21 NOTE — PROGRESS NOTES
Specialty Pharmacy Patient Management Program  Oncology Initial Assessment       Lanette Simpson is a 74 y.o. female with polycythemia vera seen by an Oncology provider and enrolled in the Oncology Patient Management program offered by [facility].  An initial outreach was conducted, including assessment of therapy appropriateness and specialty medication education for hydroxyurea. The patient was introduced to services offered by [facility], including: regular assessments, refill coordination, curbside pick-up or mail order delivery options, prior authorization maintenance, and financial assistance programs as applicable. The patient was also provided with contact information for the pharmacy team.     Regimen: Hydroxyurea 500 mg capsule PO daily    Start date of oral specialty medication:  5/22/24    Relevant Past Medical History, Comorbidities, and Vaccines  Relevant medical history and concomitant health conditions were discussed with the patient. The patient's chart has been reviewed for relevant past medical history and comorbid health conditions and updated as necessary.  Vaccines are coordinated by the patient's oncologist and primary care provider.  Past Medical History:   Diagnosis Date    Age-related osteoporosis without current pathological fracture 03/07/2022    Arthritis     Colon polyp 12/05/2013    benign    Colon, diverticulosis     Degenerative arthritis of spine     Diffuse large B cell lymphoma 2019    Heart murmur 7-28-22    Nonalcoholic hepatosteatosis     OAB (overactive bladder)     Osteopenia     Wears glasses      Social History     Socioeconomic History    Marital status:      Spouse name: Jay    Number of children: 3   Tobacco Use    Smoking status: Never    Smokeless tobacco: Never   Vaping Use    Vaping status: Never Used   Substance and Sexual Activity    Alcohol use: Never    Drug use: Never    Sexual activity: Not  Currently     Partners: Male       Allergies  Known allergies and reactions were discussed with the patient. The patient's chart has been reviewed for allergy information and updated as necessary.   No Known Allergies     Current Medication List  This medication list has been reviewed with the patient and evaluated for any interactions or necessary modifications/recommendations, and updated to include all prescription medications, OTC medications, and supplements the patient is currently taking.  This list reflects what is contained in the patient's profile, which has also been marked as reviewed to communicate to other providers it is the most up to date version of the patient's current medication therapy.   Prior to Admission medications    Medication Sig Start Date End Date Taking? Authorizing Provider   alendronate (Fosamax) 70 MG tablet Take 1 tablet by mouth Every 7 (Seven) Days. 12/12/22   Janette Fontana MD   aspirin 81 MG EC tablet Take 1 tablet by mouth Daily. 12/13/23   Austin Zayas MD   Calcium Carb-Cholecalciferol (CALCIUM+D3 PO) Take 1 tablet by mouth Daily.    Austin Zayas MD   Calcium Citrate-Vitamin D 250-2.5 MG-MCG per tablet Take  by mouth.    Austin Zayas MD   cetirizine (zyrTEC) 5 MG tablet Take 1 tablet by mouth Daily. 4/25/24 5/25/24  Austin Zayas MD   furosemide (LASIX) 20 MG tablet  5/9/24   Austin Zayas MD   hydroxyurea (HYDREA) 500 MG capsule Take 1 capsule by mouth Daily. 5/20/24   Migdalia Blanca APRN   Multiple Vitamins-Minerals (MULTIVITAMIN ADULT PO) Take 1 tablet by mouth Daily.    Austin Zayas MD   multivitamin with minerals (EYE VITAMINS & MINERALS PO)  6/12/23   Austin Zayas MD   omeprazole (priLOSEC) 20 MG capsule TAKE ONE CAPSULE BY MOUTH EVERY NIGHT AT BEDTIME 4/8/24   Migdalia Blanca APRN   ondansetron (ZOFRAN) 8 MG tablet Take 1 tablet by mouth 3 (Three) Times a Day As Needed for Nausea or Vomiting. 5/13/24    Migdalia Blanca APRN   traZODone (DESYREL) 50 MG tablet Take 1 tablet by mouth At Night As Needed for Sleep. 12/12/22   Janette Fontana MD   Probiotic Product (PROBIOTIC PO) Take 1 tablet by mouth Daily.  5/13/24  ProviderAustin MD       Drug Interactions  Reviewed concomitant medications, allergies, labs, comorbidities/medical history, quality of life, and immunization history.   Drug-drug interactions noted and discussed during education: no significant drug interactions noted. . Reminded the patient to let us know before making any changes or starting any new prescription or OTC medications so we can first assess drug interactions.  Drug-food interactions: None    Recommended Medications Assessment  Bone Health (such as calcium/vitamin D, bisphosphonate, RANKL inhibitor) - Not Indicated   VTE prophylaxis - Not Indicated   Prophylactic antimicrobials - Not Indicated     Relevant Laboratory Values  Lab Results   Component Value Date    GLUCOSE 93 05/03/2024    CALCIUM 9.5 05/03/2024     05/03/2024    K 4.4 05/03/2024    CO2 26.0 05/03/2024     05/03/2024    BUN 16 05/03/2024    CREATININE 0.88 05/03/2024    EGFRIFAFRI 85 12/27/2021    EGFRIFNONA 71 12/27/2021    BCR 18.2 05/03/2024    ANIONGAP 9.0 05/03/2024     Lab Results   Component Value Date    WBC 7.98 05/03/2024    RBC 6.23 (H) 05/03/2024    HGB 17.9 (H) 05/03/2024    HCT 54.8 (H) 05/03/2024    MCV 88.0 05/03/2024    MCH 28.7 05/03/2024    MCHC 32.7 05/03/2024    RDW 16.7 (H) 05/03/2024    RDWSD 49.6 05/03/2024    MPV 10.0 05/03/2024     (H) 05/03/2024    NEUTRORELPCT 63.3 05/03/2024    LYMPHORELPCT 22.8 05/03/2024    MONORELPCT 7.6 05/03/2024    EOSRELPCT 4.1 05/03/2024    BASORELPCT 1.9 (H) 05/03/2024    AUTOIGPER 0.3 05/03/2024    NEUTROABS 5.05 05/03/2024    LYMPHSABS 1.82 05/03/2024    MONOSABS 0.61 05/03/2024    EOSABS 0.33 05/03/2024    BASOSABS 0.15 05/03/2024    AUTOIGNUM 0.02 05/03/2024    NRBC 0.0 05/03/2024       The  above labs have been reviewed. No dose adjustments are needed for the oral specialty medication(s) based on the labs.    Initial Education Provided for Specialty Medication  The patient has been provided with the following education. All questions and concerns have been addressed prior to the patient receiving the medication, and the patient has verbalized understanding of the education and any materials provided.  Additional patient education shall be provided and documented upon request by the patient, provider or payer.      Provided patient with:   Chemo calendar to help improve adherence., Education sheets about the medication, 24-hour clinic phone number and my contact information and instructions to call should additional questions arise.     Medication Education Sheets Provided: (select all that apply)  Oral Specialty Medication: Hydroxyurea    Other Education Sheets Provided: (select all that apply)  Adherence and Symptom Tracker Sheet and ANANT Information    TOPICS COMMENTS   Storage and Handling of Oral Specialty Medication Store in the original container, in a dry location out of direct sunlight, and out of reach of children or pets. and Store at room temperature.  Discussed safe handling and what to do with any unused medication.   Administration of Oral Specialty Medication Take with or without food at the same time(s) each day. and Do not open or dissolve capsules, unless otherwise instructed by the pharmacist.   Adherence to Oral Specialty Regimen and Handling Missed Doses Patient is likely to have good treatment adherence; reinforced the importance of adherence. Reviewed how to address missed doses and encouraged the patient to let us know of any missed doses.   Anemia: role of RBC, cause, s/s, ways to manage, role of transfusion Reviewed the role of RBC and the use of transfusions if hemoglobin decreases too much.  Patient to notify us if she experiences shortness of breath, dizziness, or  palpitations.  Also let patient know that she could feel more tired than usual and to try to stay active, but rest if she needs to.    Thrombocytopenia: role of platelet, cause, s/s, ways to prevent bleeding, things to avoid, when to seek help Reviewed the role of platelets in blood clotting and when to call clinic (bloody nose that bleeds for 5 minutes despite pressure, a cut that won't stop bleeding despite pressure, gums that bleed excessively with brushing or flossing). Recommended using an electric razor, soft bristle toothbrush, and blowing your nose gently.    Neutropenia: role of WBC, cause, infection precautions, s/s of infection, when to call MD Reviewed the role of WBC, good infection prevention practices, and when to call the clinic (temperature 100.4F, sore throat, burning urination, etc).   Nausea/Vomiting: cause, use of antiemetics, dietary changes, when to call MD Emetic risk: Minimal  Premeds: None  Scheduled meds: None  PRN home meds: Ondansetron 8 mg PO TID PRN N/V  Pharmacy home meds sent to: [facility] [address]    Instructed the patient to take a dose of the PRN medication at the first onset of nausea and if it's not working to call us for additional medications.     Survivorship: distress, distress assessment, secondary malignancies, early/late effects, follow-up, social issues, social support Discussed the rare, but possible risk of secondary malignancies months to years after treatment, most commonly acute myeloid leukemia.   Miscellaneous Financial Issues: None, copay $0 at [facility]  Lab Draws: Weekly until next F/U with Dr. Zuluaga   Infertility and Sexuality:  causes, fertility preservation options, sexuality changes, ways to manage, importance of birth control Oral Oncology Therapy: Reviewed safe sex practices and the importance of minimizing exposure to body fluids while on oral oncology therapy., The patient is not of childbearing potential.   Home Care: how to manage bodily fluids  Counseled on management of soiled linens and proper flush technique.  Discussed how to manage all the side effects at home and advised when to contact the MD office     Adherence and Self-Administration  Barriers to Patient Adherence and/or Self-Administration: None  Methods for Supporting Patient Adherence and/or Self-Administration: dosing calendar  Expected duration of therapy: Until disease progression or intolerable toxicity    Goals of Therapy  Patient Goals of Therapy:   Consistently take medications as prescribed  Patient will adhere to medication regimen  Patient will report any medication side effects to healthcare provider  Clinical Goals:    Goals Addressed Today        Specialty Pharmacy General Goal      Clinical goal/therapeutic target: disease control, per the recent oncology clinic notes and labs. Target hematocrit less than 45%.             Support patient understanding of medication regimen  Ensure patient knows the pharmacy contact information  Schedule regular follow-up to monitor the treatment serious adverse events  Schedule regular follow-up to confirm medication adherence  Schedule regular follow-up to monitor disease progression or stability    Quality of Life Assessment   The patient's current (pre-therapy) quality of life was discussed with the patient. The QOL segment of this outreach has been reviewed and updated.   Quality of Life Score: 9/10    Reassessment Plan & Follow-Up  Pharmacist to perform regular reassessments no more than (6) months from the previous assessment.  Welcome information and patient satisfaction survey to be sent by retail team with patient's initial fill.  Care Coordinator to set up future refill outreaches, coordinate prescription delivery, and escalate clinical questions to pharmacist.     Additional Plans, Therapy Recommendations or Therapy Problems to Be Addressed: Malik walked the patient to the pharmacy after education to  the hydroxyurea.      Attestation  I attest the patient was actively involved in and has agreed to the above plan of care. If the prescribed therapy is at any point deemed not appropriate based on the current or future assessments, a consultation will be initiated with the patient's specialty care provider to determine the best course of action. The revised plan of therapy will be documented along with any required assessments and/or additional patient education provided.     Michelle Davenport PharmD, [facility]  Oncology Clinical Pharmacist   Phone: [phone]    5/21/2024  13:55 EDT

## 2024-05-28 ENCOUNTER — LAB (OUTPATIENT)
Dept: LAB | Facility: HOSPITAL | Age: 74
End: 2024-05-28
Payer: MEDICARE

## 2024-05-28 DIAGNOSIS — C83.31 DIFFUSE LARGE B-CELL LYMPHOMA OF LYMPH NODES OF HEAD: ICD-10-CM

## 2024-05-28 LAB
ALBUMIN SERPL-MCNC: 4.2 G/DL (ref 3.5–5.2)
ALBUMIN/GLOB SERPL: 1.4 G/DL
ALP SERPL-CCNC: 68 U/L (ref 39–117)
ALT SERPL W P-5'-P-CCNC: 21 U/L (ref 1–33)
ANION GAP SERPL CALCULATED.3IONS-SCNC: 9 MMOL/L (ref 5–15)
AST SERPL-CCNC: 32 U/L (ref 1–32)
BASOPHILS # BLD AUTO: 0.09 10*3/MM3 (ref 0–0.2)
BASOPHILS NFR BLD AUTO: 1.1 % (ref 0–1.5)
BILIRUB SERPL-MCNC: 0.7 MG/DL (ref 0–1.2)
BUN SERPL-MCNC: 15 MG/DL (ref 8–23)
BUN/CREAT SERPL: 19 (ref 7–25)
CALCIUM SPEC-SCNC: 9.4 MG/DL (ref 8.6–10.5)
CHLORIDE SERPL-SCNC: 102 MMOL/L (ref 98–107)
CO2 SERPL-SCNC: 27 MMOL/L (ref 22–29)
CREAT SERPL-MCNC: 0.79 MG/DL (ref 0.57–1)
DEPRECATED RDW RBC AUTO: 48.5 FL (ref 37–54)
EGFRCR SERPLBLD CKD-EPI 2021: 78.1 ML/MIN/1.73
EOSINOPHIL # BLD AUTO: 0.26 10*3/MM3 (ref 0–0.4)
EOSINOPHIL NFR BLD AUTO: 3.3 % (ref 0.3–6.2)
ERYTHROCYTE [DISTWIDTH] IN BLOOD BY AUTOMATED COUNT: 15.5 % (ref 12.3–15.4)
GLOBULIN UR ELPH-MCNC: 3 GM/DL
GLUCOSE SERPL-MCNC: 110 MG/DL (ref 65–99)
HCT VFR BLD AUTO: 48.4 % (ref 34–46.6)
HGB BLD-MCNC: 16.4 G/DL (ref 12–15.9)
IMM GRANULOCYTES # BLD AUTO: 0.01 10*3/MM3 (ref 0–0.05)
IMM GRANULOCYTES NFR BLD AUTO: 0.1 % (ref 0–0.5)
LYMPHOCYTES # BLD AUTO: 1.93 10*3/MM3 (ref 0.7–3.1)
LYMPHOCYTES NFR BLD AUTO: 24.3 % (ref 19.6–45.3)
MCH RBC QN AUTO: 29.1 PG (ref 26.6–33)
MCHC RBC AUTO-ENTMCNC: 33.9 G/DL (ref 31.5–35.7)
MCV RBC AUTO: 86 FL (ref 79–97)
MONOCYTES # BLD AUTO: 0.61 10*3/MM3 (ref 0.1–0.9)
MONOCYTES NFR BLD AUTO: 7.7 % (ref 5–12)
NEUTROPHILS NFR BLD AUTO: 5.04 10*3/MM3 (ref 1.7–7)
NEUTROPHILS NFR BLD AUTO: 63.5 % (ref 42.7–76)
PLATELET # BLD AUTO: 492 10*3/MM3 (ref 140–450)
PMV BLD AUTO: 9.6 FL (ref 6–12)
POTASSIUM SERPL-SCNC: 4.4 MMOL/L (ref 3.5–5.2)
PROT SERPL-MCNC: 7.2 G/DL (ref 6–8.5)
RBC # BLD AUTO: 5.63 10*6/MM3 (ref 3.77–5.28)
SODIUM SERPL-SCNC: 138 MMOL/L (ref 136–145)
WBC NRBC COR # BLD AUTO: 7.94 10*3/MM3 (ref 3.4–10.8)

## 2024-05-28 PROCEDURE — 36415 COLL VENOUS BLD VENIPUNCTURE: CPT

## 2024-05-28 PROCEDURE — 85025 COMPLETE CBC W/AUTO DIFF WBC: CPT

## 2024-05-28 PROCEDURE — 80053 COMPREHEN METABOLIC PANEL: CPT

## 2024-06-04 ENCOUNTER — LAB (OUTPATIENT)
Dept: LAB | Facility: HOSPITAL | Age: 74
End: 2024-06-04
Payer: MEDICARE

## 2024-06-04 DIAGNOSIS — C83.31 DIFFUSE LARGE B-CELL LYMPHOMA OF LYMPH NODES OF HEAD: ICD-10-CM

## 2024-06-04 LAB
ALBUMIN SERPL-MCNC: 3.9 G/DL (ref 3.5–5.2)
ALBUMIN/GLOB SERPL: 1.4 G/DL
ALP SERPL-CCNC: 61 U/L (ref 39–117)
ALT SERPL W P-5'-P-CCNC: 17 U/L (ref 1–33)
ANION GAP SERPL CALCULATED.3IONS-SCNC: 9 MMOL/L (ref 5–15)
AST SERPL-CCNC: 28 U/L (ref 1–32)
BASOPHILS # BLD AUTO: 0.08 10*3/MM3 (ref 0–0.2)
BASOPHILS NFR BLD AUTO: 1.1 % (ref 0–1.5)
BILIRUB SERPL-MCNC: 0.6 MG/DL (ref 0–1.2)
BUN SERPL-MCNC: 13 MG/DL (ref 8–23)
BUN/CREAT SERPL: 16 (ref 7–25)
CALCIUM SPEC-SCNC: 8.9 MG/DL (ref 8.6–10.5)
CHLORIDE SERPL-SCNC: 106 MMOL/L (ref 98–107)
CO2 SERPL-SCNC: 27 MMOL/L (ref 22–29)
CREAT SERPL-MCNC: 0.81 MG/DL (ref 0.57–1)
DEPRECATED RDW RBC AUTO: 49.8 FL (ref 37–54)
EGFRCR SERPLBLD CKD-EPI 2021: 75.8 ML/MIN/1.73
EOSINOPHIL # BLD AUTO: 0.24 10*3/MM3 (ref 0–0.4)
EOSINOPHIL NFR BLD AUTO: 3.3 % (ref 0.3–6.2)
ERYTHROCYTE [DISTWIDTH] IN BLOOD BY AUTOMATED COUNT: 16 % (ref 12.3–15.4)
GLOBULIN UR ELPH-MCNC: 2.8 GM/DL
GLUCOSE SERPL-MCNC: 124 MG/DL (ref 65–99)
HCT VFR BLD AUTO: 47.1 % (ref 34–46.6)
HGB BLD-MCNC: 16 G/DL (ref 12–15.9)
IMM GRANULOCYTES # BLD AUTO: 0.01 10*3/MM3 (ref 0–0.05)
IMM GRANULOCYTES NFR BLD AUTO: 0.1 % (ref 0–0.5)
LYMPHOCYTES # BLD AUTO: 1.65 10*3/MM3 (ref 0.7–3.1)
LYMPHOCYTES NFR BLD AUTO: 22.9 % (ref 19.6–45.3)
MCH RBC QN AUTO: 29.3 PG (ref 26.6–33)
MCHC RBC AUTO-ENTMCNC: 34 G/DL (ref 31.5–35.7)
MCV RBC AUTO: 86.1 FL (ref 79–97)
MONOCYTES # BLD AUTO: 0.5 10*3/MM3 (ref 0.1–0.9)
MONOCYTES NFR BLD AUTO: 6.9 % (ref 5–12)
NEUTROPHILS NFR BLD AUTO: 4.74 10*3/MM3 (ref 1.7–7)
NEUTROPHILS NFR BLD AUTO: 65.7 % (ref 42.7–76)
PLATELET # BLD AUTO: 396 10*3/MM3 (ref 140–450)
PMV BLD AUTO: 9.2 FL (ref 6–12)
POTASSIUM SERPL-SCNC: 4.3 MMOL/L (ref 3.5–5.2)
PROT SERPL-MCNC: 6.7 G/DL (ref 6–8.5)
RBC # BLD AUTO: 5.47 10*6/MM3 (ref 3.77–5.28)
SODIUM SERPL-SCNC: 142 MMOL/L (ref 136–145)
WBC NRBC COR # BLD AUTO: 7.22 10*3/MM3 (ref 3.4–10.8)

## 2024-06-04 PROCEDURE — 36415 COLL VENOUS BLD VENIPUNCTURE: CPT

## 2024-06-04 PROCEDURE — 85025 COMPLETE CBC W/AUTO DIFF WBC: CPT

## 2024-06-04 PROCEDURE — 80053 COMPREHEN METABOLIC PANEL: CPT

## 2024-06-11 ENCOUNTER — LAB (OUTPATIENT)
Dept: LAB | Facility: HOSPITAL | Age: 74
End: 2024-06-11
Payer: MEDICARE

## 2024-06-11 DIAGNOSIS — D45 POLYCYTHEMIA VERA: ICD-10-CM

## 2024-06-11 DIAGNOSIS — C83.31 DIFFUSE LARGE B-CELL LYMPHOMA OF LYMPH NODES OF HEAD: ICD-10-CM

## 2024-06-11 LAB
ALBUMIN SERPL-MCNC: 3.9 G/DL (ref 3.5–5.2)
ALBUMIN/GLOB SERPL: 1.2 G/DL
ALP SERPL-CCNC: 70 U/L (ref 39–117)
ALT SERPL W P-5'-P-CCNC: 22 U/L (ref 1–33)
ANION GAP SERPL CALCULATED.3IONS-SCNC: 11 MMOL/L (ref 5–15)
AST SERPL-CCNC: 35 U/L (ref 1–32)
BASOPHILS # BLD AUTO: 0.11 10*3/MM3 (ref 0–0.2)
BASOPHILS NFR BLD AUTO: 1.6 % (ref 0–1.5)
BILIRUB SERPL-MCNC: 0.9 MG/DL (ref 0–1.2)
BUN SERPL-MCNC: 15 MG/DL (ref 8–23)
BUN/CREAT SERPL: 18.1 (ref 7–25)
CALCIUM SPEC-SCNC: 9 MG/DL (ref 8.6–10.5)
CHLORIDE SERPL-SCNC: 102 MMOL/L (ref 98–107)
CO2 SERPL-SCNC: 26 MMOL/L (ref 22–29)
CREAT SERPL-MCNC: 0.83 MG/DL (ref 0.57–1)
DEPRECATED RDW RBC AUTO: 51.4 FL (ref 37–54)
EGFRCR SERPLBLD CKD-EPI 2021: 73.6 ML/MIN/1.73
EOSINOPHIL # BLD AUTO: 0.33 10*3/MM3 (ref 0–0.4)
EOSINOPHIL NFR BLD AUTO: 4.8 % (ref 0.3–6.2)
ERYTHROCYTE [DISTWIDTH] IN BLOOD BY AUTOMATED COUNT: 17.2 % (ref 12.3–15.4)
GLOBULIN UR ELPH-MCNC: 3.2 GM/DL
GLUCOSE SERPL-MCNC: 103 MG/DL (ref 65–99)
HCT VFR BLD AUTO: 48.5 % (ref 34–46.6)
HGB BLD-MCNC: 16.2 G/DL (ref 12–15.9)
IMM GRANULOCYTES # BLD AUTO: 0.01 10*3/MM3 (ref 0–0.05)
IMM GRANULOCYTES NFR BLD AUTO: 0.1 % (ref 0–0.5)
LYMPHOCYTES # BLD AUTO: 1.61 10*3/MM3 (ref 0.7–3.1)
LYMPHOCYTES NFR BLD AUTO: 23.5 % (ref 19.6–45.3)
MCH RBC QN AUTO: 29.1 PG (ref 26.6–33)
MCHC RBC AUTO-ENTMCNC: 33.4 G/DL (ref 31.5–35.7)
MCV RBC AUTO: 87.2 FL (ref 79–97)
MONOCYTES # BLD AUTO: 0.63 10*3/MM3 (ref 0.1–0.9)
MONOCYTES NFR BLD AUTO: 9.2 % (ref 5–12)
NEUTROPHILS NFR BLD AUTO: 4.16 10*3/MM3 (ref 1.7–7)
NEUTROPHILS NFR BLD AUTO: 60.8 % (ref 42.7–76)
PLATELET # BLD AUTO: 339 10*3/MM3 (ref 140–450)
PMV BLD AUTO: 9.6 FL (ref 6–12)
POTASSIUM SERPL-SCNC: 4.1 MMOL/L (ref 3.5–5.2)
PROT SERPL-MCNC: 7.1 G/DL (ref 6–8.5)
RBC # BLD AUTO: 5.56 10*6/MM3 (ref 3.77–5.28)
SODIUM SERPL-SCNC: 139 MMOL/L (ref 136–145)
WBC NRBC COR # BLD AUTO: 6.85 10*3/MM3 (ref 3.4–10.8)

## 2024-06-11 PROCEDURE — 36415 COLL VENOUS BLD VENIPUNCTURE: CPT

## 2024-06-11 PROCEDURE — 85025 COMPLETE CBC W/AUTO DIFF WBC: CPT

## 2024-06-11 PROCEDURE — 80053 COMPREHEN METABOLIC PANEL: CPT

## 2024-06-13 ENCOUNTER — SPECIALTY PHARMACY (OUTPATIENT)
Dept: ONCOLOGY | Facility: HOSPITAL | Age: 74
End: 2024-06-13
Payer: MEDICARE

## 2024-06-13 NOTE — PROGRESS NOTES
Specialty Pharmacy Refill Coordination Note     Lanette is a 74 y.o. female contacted today regarding refills of  hydroxyurea 500mg PO QD specialty medication(s).    Reviewed and verified with patient: yes    Specialty medication(s) and dose(s) confirmed: yes    Refill Questions      Flowsheet Row Most Recent Value   Changes to allergies? No   Changes to medications? No   New conditions or infections since last clinic visit No   Unplanned office visit, urgent care, ED, or hospital admission in the last 4 weeks  No   How does patient/caregiver feel medication is working? Good   Financial problems or insurance changes  No   Since the previous refill, were any specialty medication doses or scheduled injections missed or delayed?  No   Does this patient require a clinical escalation to a pharmacist? No            Delivery Questions      Flowsheet Row Most Recent Value   Delivery method  at Pharmacy   Delivery address verified with patient/caregiver? Yes   Number of medications in delivery 1   Medication(s) being filled and delivered Hydroxyurea (Antineoplastics Misc.)   Doses left of specialty medications 6 doses left   Copay verified? Yes   Copay amount 0$   Copay form of payment No copayment ($0)                   Follow-up: 28 day(s)     Brian Borden, Pharmacy Technician  Specialty Pharmacy Technician

## 2024-06-18 ENCOUNTER — SPECIALTY PHARMACY (OUTPATIENT)
Facility: HOSPITAL | Age: 74
End: 2024-06-18
Payer: MEDICARE

## 2024-06-18 ENCOUNTER — OFFICE VISIT (OUTPATIENT)
Dept: ONCOLOGY | Facility: CLINIC | Age: 74
End: 2024-06-18
Payer: MEDICARE

## 2024-06-18 ENCOUNTER — LAB (OUTPATIENT)
Dept: LAB | Facility: HOSPITAL | Age: 74
End: 2024-06-18
Payer: MEDICARE

## 2024-06-18 ENCOUNTER — HOSPITAL ENCOUNTER (OUTPATIENT)
Dept: ONCOLOGY | Facility: HOSPITAL | Age: 74
Discharge: HOME | End: 2024-06-18
Payer: MEDICARE

## 2024-06-18 VITALS — DIASTOLIC BLOOD PRESSURE: 62 MMHG | HEART RATE: 87 BPM | SYSTOLIC BLOOD PRESSURE: 135 MMHG

## 2024-06-18 VITALS
WEIGHT: 177 LBS | HEART RATE: 91 BPM | TEMPERATURE: 97.8 F | SYSTOLIC BLOOD PRESSURE: 121 MMHG | HEIGHT: 62 IN | DIASTOLIC BLOOD PRESSURE: 80 MMHG | OXYGEN SATURATION: 93 % | BODY MASS INDEX: 32.57 KG/M2 | RESPIRATION RATE: 18 BRPM

## 2024-06-18 DIAGNOSIS — D45 POLYCYTHEMIA VERA: ICD-10-CM

## 2024-06-18 DIAGNOSIS — C83.31 DIFFUSE LARGE B-CELL LYMPHOMA OF LYMPH NODES OF HEAD: ICD-10-CM

## 2024-06-18 DIAGNOSIS — C83.31 DIFFUSE LARGE B-CELL LYMPHOMA OF LYMPH NODES OF HEAD: Primary | ICD-10-CM

## 2024-06-18 LAB
ALBUMIN SERPL-MCNC: 4.2 G/DL (ref 3.5–5.2)
ALBUMIN/GLOB SERPL: 1.4 G/DL
ALP SERPL-CCNC: 70 U/L (ref 39–117)
ALT SERPL W P-5'-P-CCNC: 20 U/L (ref 1–33)
ANION GAP SERPL CALCULATED.3IONS-SCNC: 9 MMOL/L (ref 5–15)
AST SERPL-CCNC: 28 U/L (ref 1–32)
BASOPHILS # BLD AUTO: 0.1 10*3/MM3 (ref 0–0.2)
BASOPHILS NFR BLD AUTO: 1.5 % (ref 0–1.5)
BILIRUB SERPL-MCNC: 0.7 MG/DL (ref 0–1.2)
BUN SERPL-MCNC: 15 MG/DL (ref 8–23)
BUN/CREAT SERPL: 16.7 (ref 7–25)
CALCIUM SPEC-SCNC: 9.5 MG/DL (ref 8.6–10.5)
CHLORIDE SERPL-SCNC: 101 MMOL/L (ref 98–107)
CO2 SERPL-SCNC: 29 MMOL/L (ref 22–29)
CREAT SERPL-MCNC: 0.9 MG/DL (ref 0.57–1)
DEPRECATED RDW RBC AUTO: 53.6 FL (ref 37–54)
EGFRCR SERPLBLD CKD-EPI 2021: 66.8 ML/MIN/1.73
EOSINOPHIL # BLD AUTO: 0.17 10*3/MM3 (ref 0–0.4)
EOSINOPHIL NFR BLD AUTO: 2.6 % (ref 0.3–6.2)
ERYTHROCYTE [DISTWIDTH] IN BLOOD BY AUTOMATED COUNT: 18 % (ref 12.3–15.4)
FERRITIN SERPL-MCNC: 53.34 NG/ML (ref 13–150)
GLOBULIN UR ELPH-MCNC: 3 GM/DL
GLUCOSE SERPL-MCNC: 103 MG/DL (ref 65–99)
HCT VFR BLD AUTO: 48.6 % (ref 34–46.6)
HGB BLD-MCNC: 16.5 G/DL (ref 12–15.9)
IMM GRANULOCYTES # BLD AUTO: 0.01 10*3/MM3 (ref 0–0.05)
IMM GRANULOCYTES NFR BLD AUTO: 0.2 % (ref 0–0.5)
IRON 24H UR-MRATE: 219 MCG/DL (ref 37–145)
IRON SATN MFR SERPL: 45 % (ref 20–50)
LYMPHOCYTES # BLD AUTO: 1.82 10*3/MM3 (ref 0.7–3.1)
LYMPHOCYTES NFR BLD AUTO: 27.8 % (ref 19.6–45.3)
MCH RBC QN AUTO: 29.5 PG (ref 26.6–33)
MCHC RBC AUTO-ENTMCNC: 34 G/DL (ref 31.5–35.7)
MCV RBC AUTO: 86.9 FL (ref 79–97)
MONOCYTES # BLD AUTO: 0.61 10*3/MM3 (ref 0.1–0.9)
MONOCYTES NFR BLD AUTO: 9.3 % (ref 5–12)
NEUTROPHILS NFR BLD AUTO: 3.84 10*3/MM3 (ref 1.7–7)
NEUTROPHILS NFR BLD AUTO: 58.6 % (ref 42.7–76)
PLATELET # BLD AUTO: 337 10*3/MM3 (ref 140–450)
PMV BLD AUTO: 9.8 FL (ref 6–12)
POTASSIUM SERPL-SCNC: 4.1 MMOL/L (ref 3.5–5.2)
PROT SERPL-MCNC: 7.2 G/DL (ref 6–8.5)
RBC # BLD AUTO: 5.59 10*6/MM3 (ref 3.77–5.28)
SODIUM SERPL-SCNC: 139 MMOL/L (ref 136–145)
TIBC SERPL-MCNC: 484 MCG/DL (ref 298–536)
TRANSFERRIN SERPL-MCNC: 325 MG/DL (ref 200–360)
WBC NRBC COR # BLD AUTO: 6.55 10*3/MM3 (ref 3.4–10.8)

## 2024-06-18 PROCEDURE — 99214 OFFICE O/P EST MOD 30 MIN: CPT | Performed by: INTERNAL MEDICINE

## 2024-06-18 PROCEDURE — 84466 ASSAY OF TRANSFERRIN: CPT

## 2024-06-18 PROCEDURE — 96360 HYDRATION IV INFUSION INIT: CPT

## 2024-06-18 PROCEDURE — 99195 PHLEBOTOMY: CPT

## 2024-06-18 PROCEDURE — 83540 ASSAY OF IRON: CPT

## 2024-06-18 PROCEDURE — 80053 COMPREHEN METABOLIC PANEL: CPT

## 2024-06-18 PROCEDURE — 85025 COMPLETE CBC W/AUTO DIFF WBC: CPT

## 2024-06-18 PROCEDURE — 36415 COLL VENOUS BLD VENIPUNCTURE: CPT

## 2024-06-18 PROCEDURE — 1126F AMNT PAIN NOTED NONE PRSNT: CPT | Performed by: INTERNAL MEDICINE

## 2024-06-18 PROCEDURE — 25810000003 SODIUM CHLORIDE 0.9 % SOLUTION: Performed by: NURSE PRACTITIONER

## 2024-06-18 PROCEDURE — 82728 ASSAY OF FERRITIN: CPT

## 2024-06-18 RX ORDER — SODIUM CHLORIDE 9 MG/ML
250 INJECTION, SOLUTION INTRAVENOUS ONCE
OUTPATIENT
Start: 2024-06-21

## 2024-06-18 RX ORDER — SODIUM CHLORIDE 9 MG/ML
250 INJECTION, SOLUTION INTRAVENOUS ONCE
Status: COMPLETED | OUTPATIENT
Start: 2024-06-18 | End: 2024-06-18

## 2024-06-18 RX ADMIN — SODIUM CHLORIDE 250 ML: 9 INJECTION, SOLUTION INTRAVENOUS at 14:41

## 2024-06-18 NOTE — PROGRESS NOTES
DATE OF VISIT: 6/18/2024    REASON FOR VISIT: Followup for mandible diffuse large B-cell lymphoma     PROBLEM LIST:  1. Mandible diffuse large B-cell lymphoma, stage IIE:  A.  Presented with gum pain and swelling  B.  CT facial bones done on February 26, 2019 revealed 2.3 cm lucency in the anterior mandible  C.  Status post biopsy done on March 7, 2019 consistent with diffuse large B-cell lymphoma  D.  Bone marrow biopsy done on March 25, 2019 no evidence of lymphoma involvement.  E.  Whole body PET scan done on March 28, 2019 revealed hypermetabolic activity in the right curtis-mandible as well as jugular cervical chains hypermetabolic lymphadenopathy level 2 and level 3.  F. Started chemotherapy with R-CHOP April 2019,status post 6 cycles, completed July 22, 2019.  2.  Osteoarthritis  3.  Primary polycythemia vera, JAK2 mutation positive  4.  Heartburn    HISTORY OF PRESENT ILLNESS: The patient is a very pleasant 74 y.o. 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  Staging workup revealed stage IIE disease.  Patient was started on definitive chemotherapy with R CHOP April 8, 2019.  The patient completed cycle #6 June 22, 2019.  The patient is here today for scheduled follow-up visit.    SUBJECTIVE: Jaqueline is here today by herself.  She has been compliant with her Hydrea.  Denies any fever chills or night sweats.    Past History:  Medical History: has a past medical history of Age-related osteoporosis without current pathological fracture (03/07/2022), Arthritis, Colon polyp (12/05/2013), Colon, diverticulosis, Degenerative arthritis of spine, Diffuse large B cell lymphoma (2019), Heart murmur (7-28-22), Nonalcoholic hepatosteatosis, OAB (overactive bladder), Osteopenia, and Wears glasses.   Surgical History: has a past surgical history that includes Total abdominal hysterectomy; Bone marrow biopsy; Bone biopsy; Colonoscopy (2013); and Venous Access  "Device (Port) (N/A, 4/4/2019).   Family History: family history includes Breast cancer in her maternal grandmother and paternal grandmother; Diabetes in her brother, brother, father, and sister; Glaucoma in her paternal grandfather; Gout in her brother; Heart disease in her brother, father, and mother; Hyperlipidemia in her mother; Hypertension in her brother, brother, mother, and sister; Pancreatic cancer in her mother; Prostate cancer in her father; Stroke in her maternal grandfather.   Social History: reports that she has never smoked. She has never used smokeless tobacco. She reports that she does not drink alcohol and does not use drugs.    (Not in a hospital admission)     Allergies: Patient has no known allergies.    Review of Systems   Constitutional:  Positive for fatigue.   Respiratory:          Snoring       PHYSICAL EXAMINATION:   /80   Pulse 91   Temp 97.8 °F (36.6 °C)   Resp 18   Ht 157.5 cm (62.01\")   Wt 80.3 kg (177 lb)   SpO2 93%   BMI 32.37 kg/m²    Pain Score    06/18/24 1255   PainSc: 0-No pain          ECOG Performance Status: 1 - Symptomatic but completely ambulatory  General Appearance:  alert, cooperative, no apparent distress and appears stated age   Lungs:   Clear to auscultation bilaterally; respirations regular, even, and unlabored bilaterally   Heart:  Regular rate and rhythm, no murmurs appreciated   Abdomen:   Soft, non-tender, non-distended, and no organomegaly     Lab on 06/18/2024   Component Date Value Ref Range Status    WBC 06/18/2024 6.55  3.40 - 10.80 10*3/mm3 Final    RBC 06/18/2024 5.59 (H)  3.77 - 5.28 10*6/mm3 Final    Hemoglobin 06/18/2024 16.5 (H)  12.0 - 15.9 g/dL Final    Hematocrit 06/18/2024 48.6 (H)  34.0 - 46.6 % Final    MCV 06/18/2024 86.9  79.0 - 97.0 fL Final    MCH 06/18/2024 29.5  26.6 - 33.0 pg Final    MCHC 06/18/2024 34.0  31.5 - 35.7 g/dL Final    RDW 06/18/2024 18.0 (H)  12.3 - 15.4 % Final    RDW-SD 06/18/2024 53.6  37.0 - 54.0 fl Final    " MPV 06/18/2024 9.8  6.0 - 12.0 fL Final    Platelets 06/18/2024 337  140 - 450 10*3/mm3 Final    Neutrophil % 06/18/2024 58.6  42.7 - 76.0 % Final    Lymphocyte % 06/18/2024 27.8  19.6 - 45.3 % Final    Monocyte % 06/18/2024 9.3  5.0 - 12.0 % Final    Eosinophil % 06/18/2024 2.6  0.3 - 6.2 % Final    Basophil % 06/18/2024 1.5  0.0 - 1.5 % Final    Immature Grans % 06/18/2024 0.2  0.0 - 0.5 % Final    Neutrophils, Absolute 06/18/2024 3.84  1.70 - 7.00 10*3/mm3 Final    Lymphocytes, Absolute 06/18/2024 1.82  0.70 - 3.10 10*3/mm3 Final    Monocytes, Absolute 06/18/2024 0.61  0.10 - 0.90 10*3/mm3 Final    Eosinophils, Absolute 06/18/2024 0.17  0.00 - 0.40 10*3/mm3 Final    Basophils, Absolute 06/18/2024 0.10  0.00 - 0.20 10*3/mm3 Final    Immature Grans, Absolute 06/18/2024 0.01  0.00 - 0.05 10*3/mm3 Final   Lab on 06/11/2024   Component Date Value Ref Range Status    Glucose 06/11/2024 103 (H)  65 - 99 mg/dL Final    BUN 06/11/2024 15  8 - 23 mg/dL Final    Creatinine 06/11/2024 0.83  0.57 - 1.00 mg/dL Final    Sodium 06/11/2024 139  136 - 145 mmol/L Final    Potassium 06/11/2024 4.1  3.5 - 5.2 mmol/L Final    Slight hemolysis detected by analyzer. Result may be falsely elevated.    Chloride 06/11/2024 102  98 - 107 mmol/L Final    CO2 06/11/2024 26.0  22.0 - 29.0 mmol/L Final    Calcium 06/11/2024 9.0  8.6 - 10.5 mg/dL Final    Total Protein 06/11/2024 7.1  6.0 - 8.5 g/dL Final    Albumin 06/11/2024 3.9  3.5 - 5.2 g/dL Final    ALT (SGPT) 06/11/2024 22  1 - 33 U/L Final    AST (SGOT) 06/11/2024 35 (H)  1 - 32 U/L Final    Alkaline Phosphatase 06/11/2024 70  39 - 117 U/L Final    Total Bilirubin 06/11/2024 0.9  0.0 - 1.2 mg/dL Final    Globulin 06/11/2024 3.2  gm/dL Final    Calculated Result    A/G Ratio 06/11/2024 1.2  g/dL Final    BUN/Creatinine Ratio 06/11/2024 18.1  7.0 - 25.0 Final    Anion Gap 06/11/2024 11.0  5.0 - 15.0 mmol/L Final    eGFR 06/11/2024 73.6  >60.0 mL/min/1.73 Final    WBC 06/11/2024 6.85  3.40  - 10.80 10*3/mm3 Final    RBC 06/11/2024 5.56 (H)  3.77 - 5.28 10*6/mm3 Final    Hemoglobin 06/11/2024 16.2 (H)  12.0 - 15.9 g/dL Final    Hematocrit 06/11/2024 48.5 (H)  34.0 - 46.6 % Final    MCV 06/11/2024 87.2  79.0 - 97.0 fL Final    MCH 06/11/2024 29.1  26.6 - 33.0 pg Final    MCHC 06/11/2024 33.4  31.5 - 35.7 g/dL Final    RDW 06/11/2024 17.2 (H)  12.3 - 15.4 % Final    RDW-SD 06/11/2024 51.4  37.0 - 54.0 fl Final    MPV 06/11/2024 9.6  6.0 - 12.0 fL Final    Platelets 06/11/2024 339  140 - 450 10*3/mm3 Final    Neutrophil % 06/11/2024 60.8  42.7 - 76.0 % Final    Lymphocyte % 06/11/2024 23.5  19.6 - 45.3 % Final    Monocyte % 06/11/2024 9.2  5.0 - 12.0 % Final    Eosinophil % 06/11/2024 4.8  0.3 - 6.2 % Final    Basophil % 06/11/2024 1.6 (H)  0.0 - 1.5 % Final    Immature Grans % 06/11/2024 0.1  0.0 - 0.5 % Final    Neutrophils, Absolute 06/11/2024 4.16  1.70 - 7.00 10*3/mm3 Final    Lymphocytes, Absolute 06/11/2024 1.61  0.70 - 3.10 10*3/mm3 Final    Monocytes, Absolute 06/11/2024 0.63  0.10 - 0.90 10*3/mm3 Final    Eosinophils, Absolute 06/11/2024 0.33  0.00 - 0.40 10*3/mm3 Final    Basophils, Absolute 06/11/2024 0.11  0.00 - 0.20 10*3/mm3 Final    Immature Grans, Absolute 06/11/2024 0.01  0.00 - 0.05 10*3/mm3 Final        No results found.      ASSESSMENT: The patient is a very pleasant 74 y.o. female  with mandible diffuse large B-cell lymphoma.    PLAN:  1.  Diffuse large B-cell lymphoma:  A.  I reviewed the CT chest results from 5/3/2024 with the patient. The previously noted nodular density in the left lower lobe have resolved with no evidence of relapsed lymphoma. She has a stable left sided thyroid nodule.   B. We will plan repeat her imaging studies in 6 months, with CT chest, abdomen, and pelvis.  This will be due early November 2024.    2.  Heartburn:  A.  She will continue omeprazole 20 mg daily for acid reflux.     3.  Osteoporosis:  A.  The patient will continue calcium with vitamin D as well  as Fosamax weekly.    4.  Nonspecific 4 mm left lower lobe lung nodules:  A. I reassured the patient that these areas have resolved on most recent CT chest.     5.  Primary polycythemia, JAK2 positive:  A.  I will continue Hydrea 500 mg daily.  B.  I will continue aspirin 81 mg daily.  C.  I will change her phlebotomy to once a month for hematocrit more than 45.  D.  I reassured the patient her hematocrit has improved to 49.  Target is less than 45.    FOLLOW UP: 4 weeks with repeat labs and possibly phlebotomy.      Joan Zuluaga MD  6/18/2024

## 2024-07-09 DIAGNOSIS — C83.31 DIFFUSE LARGE B-CELL LYMPHOMA OF LYMPH NODES OF HEAD: ICD-10-CM

## 2024-07-09 DIAGNOSIS — D45 POLYCYTHEMIA VERA: Primary | ICD-10-CM

## 2024-07-10 ENCOUNTER — SPECIALTY PHARMACY (OUTPATIENT)
Dept: ONCOLOGY | Facility: HOSPITAL | Age: 74
End: 2024-07-10
Payer: MEDICARE

## 2024-07-10 NOTE — PROGRESS NOTES
Specialty Pharmacy Refill Coordination Note     Lanette is a 74 y.o. female contacted today regarding refills of  hydroxyurea 500mg PO QD specialty medication(s).    Reviewed and verified with patient: yes  Specialty medication(s) and dose(s) confirmed: yes    Refill Questions      Flowsheet Row Most Recent Value   Changes to allergies? No   Changes to medications? No   New conditions or infections since last clinic visit No   Unplanned office visit, urgent care, ED, or hospital admission in the last 4 weeks  No   How does patient/caregiver feel medication is working? Good   Financial problems or insurance changes  No   Since the previous refill, were any specialty medication doses or scheduled injections missed or delayed?  No   Does this patient require a clinical escalation to a pharmacist? No            Delivery Questions      Flowsheet Row Most Recent Value   Delivery method FedEx   Delivery address verified with patient/caregiver? Yes   Delivery address Home   Number of medications in delivery 1   Medication(s) being filled and delivered Hydroxyurea (Antineoplastics Misc.)   Doses left of specialty medications 7 doses   Copay verified? Yes   Copay amount 0$   Copay form of payment No copayment ($0)   Ship Date 7/10   Delivery Date 7/11   Signature Required No                   Follow-up: 28 day(s)     Brian Borden, Pharmacy Technician  Specialty Pharmacy Technician

## 2024-07-12 ENCOUNTER — LAB (OUTPATIENT)
Dept: LAB | Facility: HOSPITAL | Age: 74
End: 2024-07-12
Payer: MEDICARE

## 2024-07-12 DIAGNOSIS — C83.31 DIFFUSE LARGE B-CELL LYMPHOMA OF LYMPH NODES OF HEAD: ICD-10-CM

## 2024-07-12 DIAGNOSIS — D45 POLYCYTHEMIA VERA: ICD-10-CM

## 2024-07-12 LAB
ALBUMIN SERPL-MCNC: 4.3 G/DL (ref 3.5–5.2)
ALBUMIN/GLOB SERPL: 1.3 G/DL
ALP SERPL-CCNC: 64 U/L (ref 39–117)
ALT SERPL W P-5'-P-CCNC: 22 U/L (ref 1–33)
ANION GAP SERPL CALCULATED.3IONS-SCNC: 10 MMOL/L (ref 5–15)
AST SERPL-CCNC: 34 U/L (ref 1–32)
BASOPHILS # BLD AUTO: 0.11 10*3/MM3 (ref 0–0.2)
BASOPHILS NFR BLD AUTO: 1.5 % (ref 0–1.5)
BILIRUB SERPL-MCNC: 0.9 MG/DL (ref 0–1.2)
BUN SERPL-MCNC: 14 MG/DL (ref 8–23)
BUN/CREAT SERPL: 16.7 (ref 7–25)
CALCIUM SPEC-SCNC: 9.3 MG/DL (ref 8.6–10.5)
CHLORIDE SERPL-SCNC: 104 MMOL/L (ref 98–107)
CO2 SERPL-SCNC: 26 MMOL/L (ref 22–29)
CREAT SERPL-MCNC: 0.84 MG/DL (ref 0.57–1)
DEPRECATED RDW RBC AUTO: 60.5 FL (ref 37–54)
EGFRCR SERPLBLD CKD-EPI 2021: 72.6 ML/MIN/1.73
EOSINOPHIL # BLD AUTO: 0.18 10*3/MM3 (ref 0–0.4)
EOSINOPHIL NFR BLD AUTO: 2.4 % (ref 0.3–6.2)
ERYTHROCYTE [DISTWIDTH] IN BLOOD BY AUTOMATED COUNT: 19.2 % (ref 12.3–15.4)
GLOBULIN UR ELPH-MCNC: 3.2 GM/DL
GLUCOSE SERPL-MCNC: 107 MG/DL (ref 65–99)
HCT VFR BLD AUTO: 46.6 % (ref 34–46.6)
HGB BLD-MCNC: 15.9 G/DL (ref 12–15.9)
IMM GRANULOCYTES # BLD AUTO: 0.01 10*3/MM3 (ref 0–0.05)
IMM GRANULOCYTES NFR BLD AUTO: 0.1 % (ref 0–0.5)
LYMPHOCYTES # BLD AUTO: 1.79 10*3/MM3 (ref 0.7–3.1)
LYMPHOCYTES NFR BLD AUTO: 24.2 % (ref 19.6–45.3)
MCH RBC QN AUTO: 30.8 PG (ref 26.6–33)
MCHC RBC AUTO-ENTMCNC: 34.1 G/DL (ref 31.5–35.7)
MCV RBC AUTO: 90.1 FL (ref 79–97)
MONOCYTES # BLD AUTO: 0.6 10*3/MM3 (ref 0.1–0.9)
MONOCYTES NFR BLD AUTO: 8.1 % (ref 5–12)
NEUTROPHILS NFR BLD AUTO: 4.71 10*3/MM3 (ref 1.7–7)
NEUTROPHILS NFR BLD AUTO: 63.7 % (ref 42.7–76)
PLATELET # BLD AUTO: 329 10*3/MM3 (ref 140–450)
PMV BLD AUTO: 9.6 FL (ref 6–12)
POTASSIUM SERPL-SCNC: 4.1 MMOL/L (ref 3.5–5.2)
PROT SERPL-MCNC: 7.5 G/DL (ref 6–8.5)
RBC # BLD AUTO: 5.17 10*6/MM3 (ref 3.77–5.28)
SODIUM SERPL-SCNC: 140 MMOL/L (ref 136–145)
WBC NRBC COR # BLD AUTO: 7.4 10*3/MM3 (ref 3.4–10.8)

## 2024-07-12 PROCEDURE — 85025 COMPLETE CBC W/AUTO DIFF WBC: CPT

## 2024-07-12 PROCEDURE — 36415 COLL VENOUS BLD VENIPUNCTURE: CPT

## 2024-07-12 PROCEDURE — 80053 COMPREHEN METABOLIC PANEL: CPT

## 2024-07-15 ENCOUNTER — HOSPITAL ENCOUNTER (OUTPATIENT)
Facility: HOSPITAL | Age: 74
Discharge: HOME | End: 2024-07-15
Payer: MEDICARE

## 2024-07-15 ENCOUNTER — OFFICE VISIT (OUTPATIENT)
Age: 74
End: 2024-07-15
Payer: MEDICARE

## 2024-07-15 VITALS
SYSTOLIC BLOOD PRESSURE: 129 MMHG | BODY MASS INDEX: 33.31 KG/M2 | TEMPERATURE: 97 F | HEIGHT: 62 IN | DIASTOLIC BLOOD PRESSURE: 78 MMHG | HEART RATE: 78 BPM | RESPIRATION RATE: 16 BRPM | WEIGHT: 181 LBS | OXYGEN SATURATION: 99 %

## 2024-07-15 VITALS — DIASTOLIC BLOOD PRESSURE: 71 MMHG | HEART RATE: 74 BPM | SYSTOLIC BLOOD PRESSURE: 122 MMHG

## 2024-07-15 DIAGNOSIS — D45 POLYCYTHEMIA VERA: ICD-10-CM

## 2024-07-15 DIAGNOSIS — C83.31 DIFFUSE LARGE B-CELL LYMPHOMA OF LYMPH NODES OF HEAD: Primary | ICD-10-CM

## 2024-07-15 PROCEDURE — 96360 HYDRATION IV INFUSION INIT: CPT

## 2024-07-15 PROCEDURE — 99214 OFFICE O/P EST MOD 30 MIN: CPT | Performed by: NURSE PRACTITIONER

## 2024-07-15 PROCEDURE — 1159F MED LIST DOCD IN RCRD: CPT | Performed by: NURSE PRACTITIONER

## 2024-07-15 PROCEDURE — 1160F RVW MEDS BY RX/DR IN RCRD: CPT | Performed by: NURSE PRACTITIONER

## 2024-07-15 PROCEDURE — 1126F AMNT PAIN NOTED NONE PRSNT: CPT | Performed by: NURSE PRACTITIONER

## 2024-07-15 PROCEDURE — 99195 PHLEBOTOMY: CPT

## 2024-07-15 PROCEDURE — 25810000003 SODIUM CHLORIDE 0.9 % SOLUTION: Performed by: NURSE PRACTITIONER

## 2024-07-15 RX ORDER — SODIUM CHLORIDE 9 MG/ML
250 INJECTION, SOLUTION INTRAVENOUS ONCE
Status: COMPLETED | OUTPATIENT
Start: 2024-07-15 | End: 2024-07-15

## 2024-07-15 RX ORDER — SODIUM CHLORIDE 9 MG/ML
250 INJECTION, SOLUTION INTRAVENOUS ONCE
OUTPATIENT
Start: 2024-07-21

## 2024-07-15 RX ADMIN — SODIUM CHLORIDE 250 ML: 9 INJECTION, SOLUTION INTRAVENOUS at 12:30

## 2024-07-15 NOTE — PROGRESS NOTES
DATE OF VISIT: 7/15/2024    REASON FOR VISIT: Followup for mandible diffuse large B-cell lymphoma     PROBLEM LIST:  1. Mandible diffuse large B-cell lymphoma, stage IIE:  A.  Presented with gum pain and swelling  B.  CT facial bones done on February 26, 2019 revealed 2.3 cm lucency in the anterior mandible  C.  Status post biopsy done on March 7, 2019 consistent with diffuse large B-cell lymphoma  D.  Bone marrow biopsy done on March 25, 2019 no evidence of lymphoma involvement.  E.  Whole body PET scan done on March 28, 2019 revealed hypermetabolic activity in the right curtis-mandible as well as jugular cervical chains hypermetabolic lymphadenopathy level 2 and level 3.  F. Started chemotherapy with R-CHOP April 2019,status post 6 cycles, completed July 22, 2019.  2.  Osteoarthritis  3.  Primary polycythemia vera, JAK2 mutation positive  4.  Heartburn    HISTORY OF PRESENT ILLNESS: The patient is a very pleasant 74 y.o. female with past medical history significant for mandible diffuse large B-cell lymphoma diagnosed March 2019.  Staging workup revealed stage IIE disease.  Patient was started on definitive chemotherapy with R CHOP April 8, 2019.  The patient completed cycle #6 June 22, 2019.  The patient is here today for scheduled follow-up visit.    SUBJECTIVE: The patient is here today by herself. She has been doing well since her last visit. She is tolerating her Hydrea without side effects. She complains of feeling a little tired, but this is not every day. She is tolerating phlebotomy well.     Past History:  Medical History: has a past medical history of Age-related osteoporosis without current pathological fracture (03/07/2022), Arthritis, Colon polyp (12/05/2013), Colon, diverticulosis, Degenerative arthritis of spine, Diffuse large B cell lymphoma (2019), Heart murmur (7-28-22), Nonalcoholic hepatosteatosis, OAB (overactive bladder), Osteopenia, and Wears glasses.   Surgical History: has a past surgical  "history that includes Total abdominal hysterectomy; Bone marrow biopsy; Bone biopsy; Colonoscopy (2013); and Venous Access Device ([place]) (N/A, 4/4/2019).   Family History: family history includes Breast cancer in her maternal grandmother and paternal grandmother; Diabetes in her brother, brother, father, and sister; Glaucoma in her paternal grandfather; Gout in her brother; Heart disease in her brother, father, and mother; Hyperlipidemia in her mother; Hypertension in her brother, brother, mother, and sister; Pancreatic cancer in her mother; Prostate cancer in her father; Stroke in her maternal grandfather.   Social History: reports that she has never smoked. She has never used smokeless tobacco. She reports that she does not drink alcohol and does not use drugs.    (Not in a hospital admission)     Allergies: Patient has no known allergies.    Review of Systems   Constitutional:  Positive for fatigue.       PHYSICAL EXAMINATION:   /78   Pulse 78   Temp 97 °F (36.1 °C)   Resp 16   Ht 157.5 cm (62.01\")   Wt 82.1 kg (181 lb)   SpO2 99%   BMI 33.10 kg/m²    Pain Score    07/15/24 1115   PainSc: 0-No pain          ECOG Performance Status: 0 - Asymptomatic  General Appearance:  alert, cooperative, no apparent distress and appears stated age   Lungs:   Clear to auscultation bilaterally; respirations regular, even, and unlabored bilaterally   Heart:  Regular rate and rhythm, no murmurs appreciated   Abdomen:   Soft, non-tender, non-distended, and no organomegaly     Lab on 07/12/2024   Component Date Value Ref Range Status    Glucose 07/12/2024 107 (H)  65 - 99 mg/dL Final    BUN 07/12/2024 14  8 - 23 mg/dL Final    Creatinine 07/12/2024 0.84  0.57 - 1.00 mg/dL Final    Sodium 07/12/2024 140  136 - 145 mmol/L Final    Potassium 07/12/2024 4.1  3.5 - 5.2 mmol/L Final    Chloride 07/12/2024 104  98 - 107 mmol/L Final    CO2 07/12/2024 26.0  22.0 - 29.0 mmol/L Final    Calcium 07/12/2024 9.3  8.6 - 10.5 mg/dL " Final    Total Protein 07/12/2024 7.5  6.0 - 8.5 g/dL Final    Albumin 07/12/2024 4.3  3.5 - 5.2 g/dL Final    ALT (SGPT) 07/12/2024 22  1 - 33 U/L Final    AST (SGOT) 07/12/2024 34 (H)  1 - 32 U/L Final    Alkaline Phosphatase 07/12/2024 64  39 - 117 U/L Final    Total Bilirubin 07/12/2024 0.9  0.0 - 1.2 mg/dL Final    Globulin 07/12/2024 3.2  gm/dL Final    Calculated Result    A/G Ratio 07/12/2024 1.3  g/dL Final    BUN/Creatinine Ratio 07/12/2024 16.7  7.0 - 25.0 Final    Anion Gap 07/12/2024 10.0  5.0 - 15.0 mmol/L Final    eGFR 07/12/2024 72.6  >60.0 mL/min/1.73 Final    WBC 07/12/2024 7.40  3.40 - 10.80 10*3/mm3 Final    RBC 07/12/2024 5.17  3.77 - 5.28 10*6/mm3 Final    Hemoglobin 07/12/2024 15.9  12.0 - 15.9 g/dL Final    Hematocrit 07/12/2024 46.6  34.0 - 46.6 % Final    MCV 07/12/2024 90.1  79.0 - 97.0 fL Final    MCH 07/12/2024 30.8  26.6 - 33.0 pg Final    MCHC 07/12/2024 34.1  31.5 - 35.7 g/dL Final    RDW 07/12/2024 19.2 (H)  12.3 - 15.4 % Final    RDW-SD 07/12/2024 60.5 (H)  37.0 - 54.0 fl Final    MPV 07/12/2024 9.6  6.0 - 12.0 fL Final    Platelets 07/12/2024 329  140 - 450 10*3/mm3 Final    Neutrophil % 07/12/2024 63.7  42.7 - 76.0 % Final    Lymphocyte % 07/12/2024 24.2  19.6 - 45.3 % Final    Monocyte % 07/12/2024 8.1  5.0 - 12.0 % Final    Eosinophil % 07/12/2024 2.4  0.3 - 6.2 % Final    Basophil % 07/12/2024 1.5  0.0 - 1.5 % Final    Immature Grans % 07/12/2024 0.1  0.0 - 0.5 % Final    Neutrophils, Absolute 07/12/2024 4.71  1.70 - 7.00 10*3/mm3 Final    Lymphocytes, Absolute 07/12/2024 1.79  0.70 - 3.10 10*3/mm3 Final    Monocytes, Absolute 07/12/2024 0.60  0.10 - 0.90 10*3/mm3 Final    Eosinophils, Absolute 07/12/2024 0.18  0.00 - 0.40 10*3/mm3 Final    Basophils, Absolute 07/12/2024 0.11  0.00 - 0.20 10*3/mm3 Final    Immature Grans, Absolute 07/12/2024 0.01  0.00 - 0.05 10*3/mm3 Final        No results found.      ASSESSMENT: The patient is a very pleasant 74 y.o. female  with mandible  diffuse large B-cell lymphoma.    PLAN:  1.  Diffuse large B-cell lymphoma:  A. We will plan repeat her imaging studies in 6 months, with CT chest, abdomen, and pelvis.  This will be due early November 2024.    2.  Heartburn:  A.  She will continue omeprazole 20 mg daily for acid reflux.     3.  Osteoporosis:  A.  The patient will continue calcium with vitamin D as well as Fosamax weekly.    4.  Nonspecific 4 mm left lower lobe lung nodules:  A. This resolved on  her last CT scan. We will continue to follow this with follow up imaging for her lymphoma.     5.  Primary polycythemia, JAK2 positive:  A.  I will continue Hydrea 500 mg daily.  B.  I will continue aspirin 81 mg daily.  C.  She will continue phlebotomy once a month for hematocrit more than 45. She will have this done today.   D.  I reassured the patient her hematocrit has continued to improve to 46.6. We will continue to target hematocrit less than 45.    FOLLOW UP: 4 weeks with repeat labs and possible phlebotomy.      Migdalia Blanca, APRN  7/15/2024   75

## 2024-07-17 ENCOUNTER — SPECIALTY PHARMACY (OUTPATIENT)
Facility: HOSPITAL | Age: 74
End: 2024-07-17
Payer: MEDICARE

## 2024-08-05 ENCOUNTER — SPECIALTY PHARMACY (OUTPATIENT)
Dept: ONCOLOGY | Facility: HOSPITAL | Age: 74
End: 2024-08-05
Payer: MEDICARE

## 2024-08-05 NOTE — PROGRESS NOTES
Specialty Pharmacy Refill Coordination Note     Lanette is a 74 y.o. female contacted today regarding refills of  hydroxyurea 500mg PO QD specialty medication(s).    Reviewed and verified with patient: yes  Specialty medication(s) and dose(s) confirmed: yes    Refill Questions      Flowsheet Row Most Recent Value   Changes to allergies? No   Changes to medications? No   New conditions or infections since last clinic visit No   Unplanned office visit, urgent care, ED, or hospital admission in the last 4 weeks  No   How does patient/caregiver feel medication is working? Good   Financial problems or insurance changes  No   Since the previous refill, were any specialty medication doses or scheduled injections missed or delayed?  No   Does this patient require a clinical escalation to a pharmacist? No            Delivery Questions      Flowsheet Row Most Recent Value   Delivery method FedEx   Delivery address verified with patient/caregiver? Yes   Delivery address Home   Number of medications in delivery 1   Medication(s) being filled and delivered Hydroxyurea (Antineoplastics Misc.)   Doses left of specialty medications 9 days left   Copay verified? Yes   Copay amount 0$   Copay form of payment No copayment ($0)   Ship Date 8/5   Delivery Date 8/6   Signature Required No                   Follow-up: 30 day(s)     Brian Borden, Pharmacy Technician  Specialty Pharmacy Technician

## 2024-08-19 ENCOUNTER — LAB (OUTPATIENT)
Dept: LAB | Facility: HOSPITAL | Age: 74
End: 2024-08-19
Payer: MEDICARE

## 2024-08-19 DIAGNOSIS — D45 POLYCYTHEMIA VERA: ICD-10-CM

## 2024-08-19 DIAGNOSIS — C83.31 DIFFUSE LARGE B-CELL LYMPHOMA OF LYMPH NODES OF HEAD: ICD-10-CM

## 2024-08-19 LAB
BASOPHILS # BLD AUTO: 0.08 10*3/MM3 (ref 0–0.2)
BASOPHILS NFR BLD AUTO: 1.2 % (ref 0–1.5)
DEPRECATED RDW RBC AUTO: 65.9 FL (ref 37–54)
EOSINOPHIL # BLD AUTO: 0.12 10*3/MM3 (ref 0–0.4)
EOSINOPHIL NFR BLD AUTO: 1.8 % (ref 0.3–6.2)
ERYTHROCYTE [DISTWIDTH] IN BLOOD BY AUTOMATED COUNT: 18.9 % (ref 12.3–15.4)
HCT VFR BLD AUTO: 43.2 % (ref 34–46.6)
HGB BLD-MCNC: 14.9 G/DL (ref 12–15.9)
IMM GRANULOCYTES # BLD AUTO: 0 10*3/MM3 (ref 0–0.05)
IMM GRANULOCYTES NFR BLD AUTO: 0 % (ref 0–0.5)
LYMPHOCYTES # BLD AUTO: 1.76 10*3/MM3 (ref 0.7–3.1)
LYMPHOCYTES NFR BLD AUTO: 26.8 % (ref 19.6–45.3)
MCH RBC QN AUTO: 32.7 PG (ref 26.6–33)
MCHC RBC AUTO-ENTMCNC: 34.5 G/DL (ref 31.5–35.7)
MCV RBC AUTO: 94.7 FL (ref 79–97)
MONOCYTES # BLD AUTO: 0.62 10*3/MM3 (ref 0.1–0.9)
MONOCYTES NFR BLD AUTO: 9.4 % (ref 5–12)
NEUTROPHILS NFR BLD AUTO: 3.99 10*3/MM3 (ref 1.7–7)
NEUTROPHILS NFR BLD AUTO: 60.8 % (ref 42.7–76)
PLATELET # BLD AUTO: 311 10*3/MM3 (ref 140–450)
PMV BLD AUTO: 9.2 FL (ref 6–12)
RBC # BLD AUTO: 4.56 10*6/MM3 (ref 3.77–5.28)
WBC NRBC COR # BLD AUTO: 6.57 10*3/MM3 (ref 3.4–10.8)

## 2024-08-19 PROCEDURE — 85025 COMPLETE CBC W/AUTO DIFF WBC: CPT

## 2024-08-19 PROCEDURE — 36415 COLL VENOUS BLD VENIPUNCTURE: CPT

## 2024-08-20 ENCOUNTER — OFFICE VISIT (OUTPATIENT)
Dept: ONCOLOGY | Facility: CLINIC | Age: 74
End: 2024-08-20
Payer: MEDICARE

## 2024-08-20 VITALS
OXYGEN SATURATION: 99 % | DIASTOLIC BLOOD PRESSURE: 79 MMHG | BODY MASS INDEX: 32.94 KG/M2 | TEMPERATURE: 96.9 F | WEIGHT: 179 LBS | SYSTOLIC BLOOD PRESSURE: 131 MMHG | HEIGHT: 62 IN | HEART RATE: 95 BPM | RESPIRATION RATE: 18 BRPM

## 2024-08-20 DIAGNOSIS — C83.31 DIFFUSE LARGE B-CELL LYMPHOMA OF LYMPH NODES OF HEAD: Primary | ICD-10-CM

## 2024-08-20 PROCEDURE — 1126F AMNT PAIN NOTED NONE PRSNT: CPT | Performed by: INTERNAL MEDICINE

## 2024-08-20 PROCEDURE — 99214 OFFICE O/P EST MOD 30 MIN: CPT | Performed by: INTERNAL MEDICINE

## 2024-08-20 NOTE — PROGRESS NOTES
DATE OF VISIT: 8/20/2024    REASON FOR VISIT: Followup for mandible diffuse large B-cell lymphoma     PROBLEM LIST:  1. Mandible diffuse large B-cell lymphoma, stage IIE:  A.  Presented with gum pain and swelling  B.  CT facial bones done on February 26, 2019 revealed 2.3 cm lucency in the anterior mandible  C.  Status post biopsy done on March 7, 2019 consistent with diffuse large B-cell lymphoma  D.  Bone marrow biopsy done on March 25, 2019 no evidence of lymphoma involvement.  E.  Whole body PET scan done on March 28, 2019 revealed hypermetabolic activity in the right curtis-mandible as well as jugular cervical chains hypermetabolic lymphadenopathy level 2 and level 3.  F. Started chemotherapy with R-CHOP April 2019,status post 6 cycles, completed July 22, 2019.  2.  Osteoarthritis  3.  Primary polycythemia vera, JAK2 mutation positive  4.  Heartburn    HISTORY OF PRESENT ILLNESS: The patient is a very pleasant 74 y.o. female with past medical history significant for mandible diffuse large B-cell lymphoma diagnosed March 2019.  Staging workup revealed stage IIE disease.  Patient was started on definitive chemotherapy with R CHOP April 8, 2019.  The patient completed cycle #6 June 22, 2019.  The patient is here today for scheduled follow-up visit.    SUBJECTIVE: Jaqueline is here today by herself.  She has been compliant with her Hydrea.  She is feeling good.  No headaches.    Past History:  Medical History: has a past medical history of Age-related osteoporosis without current pathological fracture (03/07/2022), Arthritis, Colon polyp (12/05/2013), Colon, diverticulosis, Degenerative arthritis of spine, Diffuse large B cell lymphoma (2019), Heart murmur (7-28-22), Nonalcoholic hepatosteatosis, OAB (overactive bladder), Osteopenia, and Wears glasses.   Surgical History: has a past surgical history that includes Total abdominal hysterectomy; Bone marrow biopsy; Bone biopsy; Colonoscopy (2013); and Venous Access Device  ([place]) (N/A, 4/4/2019).   Family History: family history includes Breast cancer in her maternal grandmother and paternal grandmother; Diabetes in her brother, brother, father, and sister; Glaucoma in her paternal grandfather; Gout in her brother; Heart disease in her brother, father, and mother; Hyperlipidemia in her mother; Hypertension in her brother, brother, mother, and sister; Pancreatic cancer in her mother; Prostate cancer in her father; Stroke in her maternal grandfather.   Social History: reports that she has never smoked. She has never used smokeless tobacco. She reports that she does not drink alcohol and does not use drugs.    (Not in a hospital admission)     Allergies: Patient has no known allergies.    Review of Systems   Constitutional:  Positive for fatigue.       PHYSICAL EXAMINATION:   There were no vitals taken for this visit.   There were no vitals filed for this visit.         ECOG Performance Status: 1 - Symptomatic but completely ambulatory  General Appearance:  alert, cooperative, no apparent distress and appears stated age   Lungs:   Clear to auscultation bilaterally; respirations regular, even, and unlabored bilaterally   Heart:  Regular rate and rhythm, no murmurs appreciated   Abdomen:   Soft, non-tender, non-distended, and no organomegaly     Lab on 08/19/2024   Component Date Value Ref Range Status    WBC 08/19/2024 6.57  3.40 - 10.80 10*3/mm3 Final    RBC 08/19/2024 4.56  3.77 - 5.28 10*6/mm3 Final    Hemoglobin 08/19/2024 14.9  12.0 - 15.9 g/dL Final    Hematocrit 08/19/2024 43.2  34.0 - 46.6 % Final    MCV 08/19/2024 94.7  79.0 - 97.0 fL Final    MCH 08/19/2024 32.7  26.6 - 33.0 pg Final    MCHC 08/19/2024 34.5  31.5 - 35.7 g/dL Final    RDW 08/19/2024 18.9 (H)  12.3 - 15.4 % Final    RDW-SD 08/19/2024 65.9 (H)  37.0 - 54.0 fl Final    MPV 08/19/2024 9.2  6.0 - 12.0 fL Final    Platelets 08/19/2024 311  140 - 450 10*3/mm3 Final    Neutrophil % 08/19/2024 60.8  42.7 - 76.0 % Final     Lymphocyte % 08/19/2024 26.8  19.6 - 45.3 % Final    Monocyte % 08/19/2024 9.4  5.0 - 12.0 % Final    Eosinophil % 08/19/2024 1.8  0.3 - 6.2 % Final    Basophil % 08/19/2024 1.2  0.0 - 1.5 % Final    Immature Grans % 08/19/2024 0.0  0.0 - 0.5 % Final    Neutrophils, Absolute 08/19/2024 3.99  1.70 - 7.00 10*3/mm3 Final    Lymphocytes, Absolute 08/19/2024 1.76  0.70 - 3.10 10*3/mm3 Final    Monocytes, Absolute 08/19/2024 0.62  0.10 - 0.90 10*3/mm3 Final    Eosinophils, Absolute 08/19/2024 0.12  0.00 - 0.40 10*3/mm3 Final    Basophils, Absolute 08/19/2024 0.08  0.00 - 0.20 10*3/mm3 Final    Immature Grans, Absolute 08/19/2024 0.00  0.00 - 0.05 10*3/mm3 Final        No results found.      ASSESSMENT: The patient is a very pleasant 74 y.o. female  with mandible diffuse large B-cell lymphoma.    PLAN:  1.  Diffuse large B-cell lymphoma:  A. We will plan repeat her imaging studies in 6 months, with CT chest, abdomen, and pelvis.  This will be due early November 2024.    2.  Heartburn:  A.  She will continue omeprazole 20 mg daily for acid reflux.     3.  Osteoporosis:  A.  The patient will continue calcium with vitamin D as well as Fosamax weekly.    4.  Nonspecific 4 mm left lower lobe lung nodules:  A. This resolved on  her last CT scan. We will continue to follow this with follow up imaging for her lymphoma.     5.  Primary polycythemia, JAK2 positive:  A.  I will continue Hydrea 500 mg daily.  B.  I will continue aspirin 81 mg daily.  C.  She will continue phlebotomy once a month for hematocrit more than 45. She will have this done today.   D.  I reassured the patient her hematocrit today came back under target with level of 43.  Normal hemoglobin 14.9 normal white cell 6.6 and normal platelets 311.    FOLLOW UP: 8 weeks with repeat labs and possible phlebotomy.      Joan Zuluaga MD  8/20/2024

## 2024-08-23 ENCOUNTER — SPECIALTY PHARMACY (OUTPATIENT)
Facility: HOSPITAL | Age: 74
End: 2024-08-23
Payer: MEDICARE

## 2024-08-28 ENCOUNTER — SPECIALTY PHARMACY (OUTPATIENT)
Dept: ONCOLOGY | Facility: HOSPITAL | Age: 74
End: 2024-08-28
Payer: MEDICARE

## 2024-08-28 NOTE — PROGRESS NOTES
----- Message from Mckayla Chavez sent at 1/6/2022 10:31 AM EST -----  Regarding: canceled NP appt  Patient called to cancel NP appointment that was scheduled for 01/07/2022 with Andre Wood due to snow that is expected. She would like a call back to reschedule.      Specialty Pharmacy Refill Coordination Note     Lanette is a 74 y.o. female contacted today regarding refills of  hydroxyurea 500mg PO QD specialty medication(s).    Reviewed and verified with patient: yes  Specialty medication(s) and dose(s) confirmed: yes    Refill Questions      Flowsheet Row Most Recent Value   Changes to allergies? No   Changes to medications? No   New conditions or infections since last clinic visit No   Unplanned office visit, urgent care, ED, or hospital admission in the last 4 weeks  No   How does patient/caregiver feel medication is working? Good   Financial problems or insurance changes  No   Since the previous refill, were any specialty medication doses or scheduled injections missed or delayed?  No   Does this patient require a clinical escalation to a pharmacist? No            Delivery Questions      Flowsheet Row Most Recent Value   Delivery method UPS   Delivery address verified with patient/caregiver? Yes   Delivery address Home   Number of medications in delivery 1   Medication(s) being filled and delivered Hydroxyurea (Antineoplastics Misc.)   Doses left of specialty medications 14 days of medication left   Copay verified? Yes   Copay amount 0$   Copay form of payment No copayment ($0)   Ship Date 8/28   Delivery Date 8/29   Signature Required No                   Follow-up: 28 day(s)     Brian Borden, Pharmacy Technician  Specialty Pharmacy Technician

## 2024-09-30 ENCOUNTER — SPECIALTY PHARMACY (OUTPATIENT)
Dept: ONCOLOGY | Facility: HOSPITAL | Age: 74
End: 2024-09-30
Payer: MEDICARE

## 2024-09-30 NOTE — PROGRESS NOTES
Specialty Pharmacy Refill Coordination Note     Lanette is a 74 y.o. female contacted today regarding refills of  hydroxyurea 500mg PO QD specialty medication(s).    Reviewed and verified with patient: yes  Specialty medication(s) and dose(s) confirmed: yes    Refill Questions      Flowsheet Row Most Recent Value   Changes to allergies? No   Changes to medications? No   New conditions or infections since last clinic visit No   Unplanned office visit, urgent care, ED, or hospital admission in the last 4 weeks  No   How does patient/caregiver feel medication is working? Good   Financial problems or insurance changes  No   Since the previous refill, were any specialty medication doses or scheduled injections missed or delayed?  No   Does this patient require a clinical escalation to a pharmacist? No            Delivery Questions      Flowsheet Row Most Recent Value   Delivery method UPS   Delivery address verified with patient/caregiver? Yes   Delivery address Home   Number of medications in delivery 1   Medication(s) being filled and delivered Hydroxyurea (Antineoplastics Misc.)   Doses left of specialty medications 10 days left   Copay verified? Yes   Copay amount 0$   Copay form of payment No copayment ($0)   Ship Date 9/30   Delivery Date 10/1   Signature Required No                   Follow-up: 28 day(s)     Brian Borden, Pharmacy Technician  Specialty Pharmacy Technician

## 2024-10-02 ENCOUNTER — TELEPHONE (OUTPATIENT)
Dept: ONCOLOGY | Facility: CLINIC | Age: 74
End: 2024-10-02
Payer: MEDICARE

## 2024-10-02 NOTE — TELEPHONE ENCOUNTER
Caller: Lanette Simpson    Relationship: Self    Best call back number: [phone]        What was the call regarding: PATIENT STATES HER SCHEDULE SEEMS TO CLOSE TOGETHER AS FAR AS HER APPOINTMENTS AND WANTED TO DISCUSS SCHEDULING     PLEASE CALL TO ADVISE

## 2024-10-03 NOTE — TELEPHONE ENCOUNTER
Patient was called back she would like to get rid of her appointments in October, the phleb. and Page. Follow up.  She would instead do them in November around her CT scan appointment.

## 2024-10-03 NOTE — TELEPHONE ENCOUNTER
Caller: Chevy Simpson    Relationship: Self    Best call back number: [phone]    What was the call regarding: CHEVY WAS RETURNING EDUARD'S CALL. SHE SAYS THE NUMBER HE LEFT IS NOT IN SERVICE.

## 2024-10-22 ENCOUNTER — TELEPHONE (OUTPATIENT)
Dept: ONCOLOGY | Facility: CLINIC | Age: 74
End: 2024-10-22
Payer: MEDICARE

## 2024-10-22 NOTE — TELEPHONE ENCOUNTER
RN called patient back to let her know that she cannot donate blood with a polycythemia diagnosis, they will not use her blood. Advised that the [place] [facility] does perform phlebo, but the blood is not used. Patient v/u and appreciation. She just wanted to see if her blood could be used. Will discuss further at next appt.

## 2024-10-22 NOTE — TELEPHONE ENCOUNTER
Caller: Lanette Simpson    Relationship: Self    Best call back number: [phone]     Who are you requesting to speak with (clinical staff, provider,  specific staff member): CLINICAL      What was the call regarding: PATIENT CALLING TO CLARIFY IF OK TO PARTICIPATE IN BLOOD DRIVE DONATION TO [facility]

## 2024-10-23 ENCOUNTER — SPECIALTY PHARMACY (OUTPATIENT)
Dept: ONCOLOGY | Facility: HOSPITAL | Age: 74
End: 2024-10-23
Payer: MEDICARE

## 2024-10-23 DIAGNOSIS — C83.31 DIFFUSE LARGE B-CELL LYMPHOMA OF LYMPH NODES OF HEAD: ICD-10-CM

## 2024-10-23 RX ORDER — HYDROXYUREA 500 MG/1
500 CAPSULE ORAL DAILY
Qty: 28 CAPSULE | Refills: 11 | Status: SHIPPED | OUTPATIENT
Start: 2024-10-23

## 2024-10-23 NOTE — PROGRESS NOTES
Specialty Pharmacy Refill Coordination Note     Lanette is a 74 y.o. female contacted today regarding refills of  hydroxyurea 500mg PO QD specialty medication(s).    Reviewed and verified with patient: yes  Specialty medication(s) and dose(s) confirmed: yes    Refill Questions      Flowsheet Row Most Recent Value   Changes to allergies? No   Changes to medications? No   New conditions or infections since last clinic visit No   Unplanned office visit, urgent care, ED, or hospital admission in the last 4 weeks  No   How does patient/caregiver feel medication is working? Good   Financial problems or insurance changes  No   Since the previous refill, were any specialty medication doses or scheduled injections missed or delayed?  No   Does this patient require a clinical escalation to a pharmacist? No            Delivery Questions      Flowsheet Row Most Recent Value   Delivery method UPS   Delivery address verified with patient/caregiver? Yes   Delivery address Home   Number of medications in delivery 1   Medication(s) being filled and delivered Hydroxyurea (HYDREA)   Doses left of specialty medications 15 days left   Copay verified? Yes   Copay amount 0$   Copay form of payment No copayment ($0)   Ship Date 10/24   Delivery Date 10/25   Signature Required No                   Follow-up: 28 day(s)     Brian Borden, Pharmacy Technician  Specialty Pharmacy Technician

## 2024-10-23 NOTE — PROGRESS NOTES
Re: Refills of Oral Specialty Medication - Hydrea (hydroxyurea)    Drug-Drug Interactions: The current medication list was reviewed and there are no relevant drug-drug interactions with the specialty medication.  Medication Allergies: The patient has NKDA  Review of Labs/Dose Adjustments: NO DOSE CHANGE - I reviewed the most recent note and labs and the patient will continue without any dose changes.  I sent refills as described below.    Drug: Hydrea (hydroxyurea)  Strength: 50 mg  Directions: Take 1 capsule by mouth daily  Quantity: 28  Refills: 11  Pharmacy prescription sent to: [facility]    Jarek HilarioD, BCOP  Clinical Oncology Pharmacy Specialist  Phone: [phone]      10/23/2024  13:06 EDT

## 2024-11-05 ENCOUNTER — SPECIALTY PHARMACY (OUTPATIENT)
Dept: ONCOLOGY | Facility: HOSPITAL | Age: 74
End: 2024-11-05
Payer: MEDICARE

## 2024-11-05 NOTE — PROGRESS NOTES
Specialty Pharmacy Patient Management Program  Oncology 6-Month Clinical Assessment       Lanette Simpson is a 74 y.o. female with PCV seen today to assess adherence and side effects.    Reason for Outreach: Routine medication check-in .    Regimen: Hydroxyurea 500mg PO daily + aspirin 81mg PO daily    Specialty Pharmacy Goal   Goals Addressed Today        Specialty Pharmacy General Goal      Clinical goal/therapeutic target: disease control, per the recent oncology clinic notes and labs. Target hematocrit less than 45%.    Latest Reference Range & Units 05/28/24 10:21 06/04/24 12:02 06/11/24 10:36 06/18/24 07/12/24 09:35 08/19/24 10:55   Hematocrit 34.0 - 46.6 % 48.4 (H) 47.1 (H) 48.5 (H) 48.6 (H) 46.6 43.2   (H): Data is abnormally high    11/5/24: I have reviewed the most recent clinic note and labs. Dr. Zuluaga recommends continuing hydroxyurea at this time. The patient is tolerating the medication and is currently on track for her goal.            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    GERD (gastroesophageal reflux disease) K21.9    Varicose vein of leg I83.90    Urinary frequency R35.0    Diffuse large B-cell lymphoma of lymph nodes of head C83.31    Overactive bladder N32.81    Primary insomnia F51.01    Age-related osteoporosis without current pathological fracture M81.0    Nonrheumatic aortic valve stenosis I35.0    Polycythemia vera D45       Medication Assessment for Specialty Medication(s):  Medication Assessment  Follow Up Clinical Assessment  Linked Medication(s) Assessed: Hydroxyurea (HYDREA)  Therapeutic appropriateness: Appropriate  Medication tolerability: Tolerating with no to minimal ADRs  Medication plan: Continue therapy with normal follow-up  Quality of Life Improvement Scale: 10-Significantly better  Administration: Patient is taking every day at the same  time .   Patient can self administer medications: yes  Medication Follow-Up Plan: Next clinical assessment and Refill coordination  Lab Review: The labs listed below have been reviewed. No dose adjustments are needed for the oral specialty medication(s) based on the labs.    Lab Results   Component Value Date    GLUCOSE 107 (H) 07/12/2024    CALCIUM 9.3 07/12/2024     07/12/2024    K 4.1 07/12/2024    CO2 26.0 07/12/2024     07/12/2024    BUN 14 07/12/2024    CREATININE 0.84 07/12/2024    EGFRIFAFRI 85 12/27/2021    EGFRIFNONA 71 12/27/2021    BCR 16.7 07/12/2024    ANIONGAP 10.0 07/12/2024     Lab Results   Component Value Date    WBC 6.57 08/19/2024    RBC 4.56 08/19/2024    HGB 14.9 08/19/2024    HCT 43.2 08/19/2024    MCV 94.7 08/19/2024    MCH 32.7 08/19/2024    MCHC 34.5 08/19/2024    RDW 18.9 (H) 08/19/2024    RDWSD 65.9 (H) 08/19/2024    MPV 9.2 08/19/2024     08/19/2024    NEUTRORELPCT 60.8 08/19/2024    LYMPHORELPCT 26.8 08/19/2024    MONORELPCT 9.4 08/19/2024    EOSRELPCT 1.8 08/19/2024    BASORELPCT 1.2 08/19/2024    AUTOIGPER 0.0 08/19/2024    NEUTROABS 3.99 08/19/2024    LYMPHSABS 1.76 08/19/2024    MONOSABS 0.62 08/19/2024    EOSABS 0.12 08/19/2024    BASOSABS 0.08 08/19/2024    AUTOIGNUM 0.00 08/19/2024    NRBC 0.0 05/03/2024     Drug-drug interactions  Completed medication reconciliation today to assess for drug interactions. Patient denies starting or stopping any medications.    Assessed medication list for interactions, no significant drug interactions noted.   Advised patient to call the clinic if any new medications are started so we can assess for drug-drug interactions.  Drug-food interactions discussed  Vaccines are coordinated by the patient's oncologist and primary care provider.    Medications   Medicines reviewed by Shani Grace PharmD on 11/5/2024 at  9:52 AM  Prior to Admission medications    Medication Sig Start Date End Date Taking? Authorizing Provider    alendronate (Fosamax) 70 MG tablet Take 1 tablet by mouth Every 7 (Seven) Days. 12/12/22   Janette Fontana MD   aspirin 81 MG EC tablet Take 1 tablet by mouth Daily. 12/13/23   Austin Zayas MD   Calcium Carb-Cholecalciferol (CALCIUM+D3 PO) Take 1 tablet by mouth Daily.    Austin Zayas MD   Calcium Citrate-Vitamin D 250-2.5 MG-MCG per tablet Take  by mouth.    Austin Zayas MD   furosemide (LASIX) 20 MG tablet  5/9/24   Austin Zayas MD   hydroxyurea (HYDREA) 500 MG capsule Take 1 capsule by mouth Daily. 10/23/24   Joan Zuluaga MD   Multiple Vitamins-Minerals (MULTIVITAMIN ADULT PO) Take 1 tablet by mouth Daily.    Austin Zayas MD   multivitamin with minerals (EYE VITAMINS & MINERALS PO)  6/12/23   Austin Zayas MD   omeprazole (priLOSEC) 20 MG capsule TAKE 1 CAPSULE BY MOUTH EVERY NIGHT AT BEDTIME 10/4/24   Saul Lara MD   ondansetron (ZOFRAN) 8 MG tablet Take 1 tablet by mouth 3 (Three) Times a Day As Needed for Nausea or Vomiting. 5/13/24   Migdalia Blanca APRN   traZODone (DESYREL) 50 MG tablet Take 1 tablet by mouth At Night As Needed for Sleep. 12/12/22   Janette Fontana MD   hydroxyurea (HYDREA) 500 MG capsule Take 1 capsule by mouth Daily. 5/20/24 10/23/24  Migdalia Blanca APRN       Allergies  Known allergies and reactions were discussed with the patient. The patient's chart has been reviewed for allergy information and updated as necessary.   No Known Allergies      Allergies reviewed by Shani Grace, PharmD on 11/5/2024 at  9:52 AM    Hospitalizations and Urgent Care Visits Since Last Assessment:  Unplanned hospitalizations or inpatient admissions: 0  ED Visits: 0  Urgent Office Visits: 0    Adherence Assessment:  Adherence Questions  Linked Medication(s) Assessed: Hydroxyurea (HYDREA)  On average, how many doses/injections does the patient miss per month?: 0  What are the identified reasons for non-adherence or missed doses? : no problems  identified  What is the estimated medication adherence level?: %  Based on the patient/caregiver response and refill history, does this patient require an MTP to track adherence improvements?: no    Quality of Life Assessment:  Quality of Life Assessment  Quality of Life Improvement Scale: 10-Significantly better  -- Quality of Life: 10/10    Financial Assessment:  Medication availability/affordability: Patient has had no issues obtaining medication from pharmacy.    Attestation:  I attest the patient was actively involved in and has agreed to the above plan of care. If the prescribed therapy is at any point deemed not appropriate based on the current or future assessments, a consultation will be initiated with the patient's specialty care provider to determine the best course of action. The revised plan of therapy will be documented along with any required assessments and/or additional patient education provided.       All questions addressed and patient had no additional concerns. Patient has pharmacy contact information.    Shani Grace PharmD, [facility]  Clinical Oncology Pharmacy Specialist  Phone: [phone]      11/5/2024  09:53 EST

## 2024-11-11 ENCOUNTER — LAB (OUTPATIENT)
Dept: LAB | Facility: HOSPITAL | Age: 74
End: 2024-11-11
Payer: MEDICARE

## 2024-11-11 ENCOUNTER — HOSPITAL ENCOUNTER (OUTPATIENT)
Dept: CT IMAGING | Facility: HOSPITAL | Age: 74
Discharge: HOME | End: 2024-11-11
Payer: MEDICARE

## 2024-11-11 DIAGNOSIS — D45 POLYCYTHEMIA VERA: ICD-10-CM

## 2024-11-11 DIAGNOSIS — C83.31 DIFFUSE LARGE B-CELL LYMPHOMA OF LYMPH NODES OF HEAD: ICD-10-CM

## 2024-11-11 LAB
ALBUMIN SERPL-MCNC: 4.3 G/DL (ref 3.5–5.2)
ALBUMIN/GLOB SERPL: 1.3 G/DL
ALP SERPL-CCNC: 63 U/L (ref 39–117)
ALT SERPL W P-5'-P-CCNC: 19 U/L (ref 1–33)
ANION GAP SERPL CALCULATED.3IONS-SCNC: 12 MMOL/L (ref 5–15)
AST SERPL-CCNC: 31 U/L (ref 1–32)
BASOPHILS # BLD AUTO: 0.1 10*3/MM3 (ref 0–0.2)
BASOPHILS NFR BLD AUTO: 1.6 % (ref 0–1.5)
BILIRUB SERPL-MCNC: 0.8 MG/DL (ref 0–1.2)
BUN SERPL-MCNC: 14 MG/DL (ref 8–23)
BUN/CREAT SERPL: 16.1 (ref 7–25)
CALCIUM SPEC-SCNC: 9.2 MG/DL (ref 8.6–10.5)
CHLORIDE SERPL-SCNC: 104 MMOL/L (ref 98–107)
CO2 SERPL-SCNC: 24 MMOL/L (ref 22–29)
CREAT SERPL-MCNC: 0.87 MG/DL (ref 0.57–1)
DEPRECATED RDW RBC AUTO: 57.2 FL (ref 37–54)
EGFRCR SERPLBLD CKD-EPI 2021: 69.6 ML/MIN/1.73
EOSINOPHIL # BLD AUTO: 0.18 10*3/MM3 (ref 0–0.4)
EOSINOPHIL NFR BLD AUTO: 2.8 % (ref 0.3–6.2)
ERYTHROCYTE [DISTWIDTH] IN BLOOD BY AUTOMATED COUNT: 15 % (ref 12.3–15.4)
GLOBULIN UR ELPH-MCNC: 3.3 GM/DL
GLUCOSE SERPL-MCNC: 89 MG/DL (ref 65–99)
HCT VFR BLD AUTO: 43.9 % (ref 34–46.6)
HGB BLD-MCNC: 15 G/DL (ref 12–15.9)
IMM GRANULOCYTES # BLD AUTO: 0.01 10*3/MM3 (ref 0–0.05)
IMM GRANULOCYTES NFR BLD AUTO: 0.2 % (ref 0–0.5)
LYMPHOCYTES # BLD AUTO: 1.88 10*3/MM3 (ref 0.7–3.1)
LYMPHOCYTES NFR BLD AUTO: 29.2 % (ref 19.6–45.3)
MCH RBC QN AUTO: 35.6 PG (ref 26.6–33)
MCHC RBC AUTO-ENTMCNC: 34.2 G/DL (ref 31.5–35.7)
MCV RBC AUTO: 104.3 FL (ref 79–97)
MONOCYTES # BLD AUTO: 0.61 10*3/MM3 (ref 0.1–0.9)
MONOCYTES NFR BLD AUTO: 9.5 % (ref 5–12)
NEUTROPHILS NFR BLD AUTO: 3.66 10*3/MM3 (ref 1.7–7)
NEUTROPHILS NFR BLD AUTO: 56.7 % (ref 42.7–76)
NRBC BLD AUTO-RTO: 0 /100 WBC (ref 0–0.2)
PLATELET # BLD AUTO: 353 10*3/MM3 (ref 140–450)
PMV BLD AUTO: 10.1 FL (ref 6–12)
POTASSIUM SERPL-SCNC: 4.3 MMOL/L (ref 3.5–5.2)
PROT SERPL-MCNC: 7.6 G/DL (ref 6–8.5)
RBC # BLD AUTO: 4.21 10*6/MM3 (ref 3.77–5.28)
SODIUM SERPL-SCNC: 140 MMOL/L (ref 136–145)
WBC NRBC COR # BLD AUTO: 6.44 10*3/MM3 (ref 3.4–10.8)

## 2024-11-11 PROCEDURE — 25510000001 IOPAMIDOL 61 % SOLUTION: Performed by: NURSE PRACTITIONER

## 2024-11-11 PROCEDURE — 80053 COMPREHEN METABOLIC PANEL: CPT

## 2024-11-11 PROCEDURE — 71260 CT THORAX DX C+: CPT

## 2024-11-11 PROCEDURE — 85025 COMPLETE CBC W/AUTO DIFF WBC: CPT

## 2024-11-11 PROCEDURE — 74177 CT ABD & PELVIS W/CONTRAST: CPT

## 2024-11-11 PROCEDURE — 36415 COLL VENOUS BLD VENIPUNCTURE: CPT

## 2024-11-11 RX ORDER — IOPAMIDOL 612 MG/ML
100 INJECTION, SOLUTION INTRAVASCULAR
Status: COMPLETED | OUTPATIENT
Start: 2024-11-11 | End: 2024-11-11

## 2024-11-11 RX ADMIN — IOPAMIDOL 100 ML: 612 INJECTION, SOLUTION INTRAVENOUS at 09:45

## 2024-11-19 ENCOUNTER — OFFICE VISIT (OUTPATIENT)
Dept: ONCOLOGY | Facility: CLINIC | Age: 74
End: 2024-11-19
Payer: MEDICARE

## 2024-11-19 ENCOUNTER — APPOINTMENT (OUTPATIENT)
Dept: ONCOLOGY | Facility: HOSPITAL | Age: 74
End: 2024-11-19
Payer: MEDICARE

## 2024-11-19 ENCOUNTER — LAB (OUTPATIENT)
Dept: LAB | Facility: HOSPITAL | Age: 74
End: 2024-11-19
Payer: MEDICARE

## 2024-11-19 ENCOUNTER — SPECIALTY PHARMACY (OUTPATIENT)
Dept: ONCOLOGY | Facility: HOSPITAL | Age: 74
End: 2024-11-19
Payer: MEDICARE

## 2024-11-19 VITALS
OXYGEN SATURATION: 100 % | HEIGHT: 62 IN | WEIGHT: 179.9 LBS | BODY MASS INDEX: 33.1 KG/M2 | TEMPERATURE: 97.1 F | DIASTOLIC BLOOD PRESSURE: 72 MMHG | SYSTOLIC BLOOD PRESSURE: 122 MMHG | RESPIRATION RATE: 18 BRPM | HEART RATE: 86 BPM

## 2024-11-19 DIAGNOSIS — C83.31 DIFFUSE LARGE B-CELL LYMPHOMA OF LYMPH NODES OF HEAD: ICD-10-CM

## 2024-11-19 DIAGNOSIS — D45 POLYCYTHEMIA VERA: ICD-10-CM

## 2024-11-19 DIAGNOSIS — C83.31 DIFFUSE LARGE B-CELL LYMPHOMA OF LYMPH NODES OF HEAD: Primary | ICD-10-CM

## 2024-11-19 LAB
HCT VFR BLD AUTO: 41.9 % (ref 34–46.6)
HGB BLD-MCNC: 14.5 G/DL (ref 12–15.9)

## 2024-11-19 PROCEDURE — 1126F AMNT PAIN NOTED NONE PRSNT: CPT | Performed by: INTERNAL MEDICINE

## 2024-11-19 PROCEDURE — 36415 COLL VENOUS BLD VENIPUNCTURE: CPT

## 2024-11-19 PROCEDURE — 99214 OFFICE O/P EST MOD 30 MIN: CPT | Performed by: INTERNAL MEDICINE

## 2024-11-19 PROCEDURE — 85018 HEMOGLOBIN: CPT

## 2024-11-19 PROCEDURE — 85014 HEMATOCRIT: CPT

## 2024-11-19 NOTE — PROGRESS NOTES
Specialty Pharmacy Refill Coordination Note     Lanette is a 74 y.o. female contacted today regarding refills of  hydroxyurea 500mg PO QD specialty medication(s).    Reviewed and verified with patient: yes  Specialty medication(s) and dose(s) confirmed: yes    Refill Questions      Flowsheet Row Most Recent Value   Changes to allergies? No   Changes to medications? No   New conditions or infections since last clinic visit No   Unplanned office visit, urgent care, ED, or hospital admission in the last 4 weeks  No   How does patient/caregiver feel medication is working? Good   Financial problems or insurance changes  No   Since the previous refill, were any specialty medication doses or scheduled injections missed or delayed?  No   Does this patient require a clinical escalation to a pharmacist? No            Delivery Questions      Flowsheet Row Most Recent Value   Delivery method UPS   Delivery address verified with patient/caregiver? Yes   Delivery address Home   Number of medications in delivery 1   Medication(s) being filled and delivered Hydroxyurea (HYDREA)   Doses left of specialty medications 16 days left   Copay verified? Yes   Copay amount 0$   Copay form of payment No copayment ($0)   Ship Date 11/19   Delivery Date 11/20   Signature Required No                   Follow-up: 28 day(s)     Brian Borden, Pharmacy Technician  Specialty Pharmacy Technician

## 2024-11-19 NOTE — PROGRESS NOTES
DATE OF VISIT: 11/19/2024    REASON FOR VISIT: Followup for mandible diffuse large B-cell lymphoma     PROBLEM LIST:  1. Mandible diffuse large B-cell lymphoma, stage IIE:  A.  Presented with gum pain and swelling  B.  CT facial bones done on February 26, 2019 revealed 2.3 cm lucency in the anterior mandible  C.  Status post biopsy done on March 7, 2019 consistent with diffuse large B-cell lymphoma  D.  Bone marrow biopsy done on March 25, 2019 no evidence of lymphoma involvement.  E.  Whole body PET scan done on March 28, 2019 revealed hypermetabolic activity in the right curtis-mandible as well as jugular cervical chains hypermetabolic lymphadenopathy level 2 and level 3.  F. Started chemotherapy with R-CHOP April 2019,status post 6 cycles, completed July 22, 2019.  2.  Osteoarthritis  3.  Primary polycythemia vera, JAK2 mutation positive  4.  Heartburn    HISTORY OF PRESENT ILLNESS: The patient is a very pleasant 74 y.o. female with past medical history significant for mandible diffuse large B-cell lymphoma diagnosed March 2019.  Staging workup revealed stage IIE disease.  Patient was started on definitive chemotherapy with R CHOP April 8, 2019.  The patient completed cycle #6 June 22, 2019.  The patient is here today for scheduled follow-up visit.    SUBJECTIVE: Jaqueline is here today by herself.  All in all she is doing well but she has been compliant with her Hydrea.  She is anxious about the scan results.    Past History:  Medical History: has a past medical history of Age-related osteoporosis without current pathological fracture (03/07/2022), Arthritis, Colon polyp (12/05/2013), Colon, diverticulosis, Degenerative arthritis of spine, Diffuse large B cell lymphoma (2019), Heart murmur (7-28-22), Nonalcoholic hepatosteatosis, OAB (overactive bladder), Osteopenia, and Wears glasses.   Surgical History: has a past surgical history that includes Total abdominal hysterectomy; Bone marrow biopsy; Bone biopsy;  "Colonoscopy (2013); and Venous Access Device (Port) (N/A, 4/4/2019).   Family History: family history includes Breast cancer in her maternal grandmother and paternal grandmother; Diabetes in her brother, brother, father, and sister; Glaucoma in her paternal grandfather; Gout in her brother; Heart disease in her brother, father, and mother; Hyperlipidemia in her mother; Hypertension in her brother, brother, mother, and sister; Pancreatic cancer in her mother; Prostate cancer in her father; Stroke in her maternal grandfather.   Social History: reports that she has never smoked. She has never used smokeless tobacco. She reports that she does not drink alcohol and does not use drugs.    (Not in a hospital admission)     Allergies: Patient has no known allergies.    Review of Systems   Constitutional:  Positive for fatigue.       PHYSICAL EXAMINATION:   Ht 157.5 cm (62.01\")   BMI 32.73 kg/m²    There were no vitals filed for this visit.         ECOG Performance Status: 1 - Symptomatic but completely ambulatory  General Appearance:  alert, cooperative, no apparent distress and appears stated age   Lungs:   Clear to auscultation bilaterally; respirations regular, even, and unlabored bilaterally   Heart:  Regular rate and rhythm, no murmurs appreciated   Abdomen:   Soft, non-tender, non-distended, and no organomegaly     Lab on 11/19/2024   Component Date Value Ref Range Status    Hematocrit 11/19/2024 41.9  34.0 - 46.6 % Final    Hemoglobin 11/19/2024 14.5  12.0 - 15.9 g/dL Final   Lab on 11/11/2024   Component Date Value Ref Range Status    Glucose 11/11/2024 89  65 - 99 mg/dL Final    BUN 11/11/2024 14  8 - 23 mg/dL Final    Creatinine 11/11/2024 0.87  0.57 - 1.00 mg/dL Final    Sodium 11/11/2024 140  136 - 145 mmol/L Final    Potassium 11/11/2024 4.3  3.5 - 5.2 mmol/L Final    Chloride 11/11/2024 104  98 - 107 mmol/L Final    CO2 11/11/2024 24.0  22.0 - 29.0 mmol/L Final    Calcium 11/11/2024 9.2  8.6 - 10.5 mg/dL " Final    Total Protein 11/11/2024 7.6  6.0 - 8.5 g/dL Final    Albumin 11/11/2024 4.3  3.5 - 5.2 g/dL Final    ALT (SGPT) 11/11/2024 19  1 - 33 U/L Final    AST (SGOT) 11/11/2024 31  1 - 32 U/L Final    Alkaline Phosphatase 11/11/2024 63  39 - 117 U/L Final    Total Bilirubin 11/11/2024 0.8  0.0 - 1.2 mg/dL Final    Globulin 11/11/2024 3.3  gm/dL Final    Calculated Result    A/G Ratio 11/11/2024 1.3  g/dL Final    BUN/Creatinine Ratio 11/11/2024 16.1  7.0 - 25.0 Final    Anion Gap 11/11/2024 12.0  5.0 - 15.0 mmol/L Final    eGFR 11/11/2024 69.6  >60.0 mL/min/1.73 Final    WBC 11/11/2024 6.44  3.40 - 10.80 10*3/mm3 Final    RBC 11/11/2024 4.21  3.77 - 5.28 10*6/mm3 Final    Hemoglobin 11/11/2024 15.0  12.0 - 15.9 g/dL Final    Hematocrit 11/11/2024 43.9  34.0 - 46.6 % Final    MCV 11/11/2024 104.3 (H)  79.0 - 97.0 fL Final    MCH 11/11/2024 35.6 (H)  26.6 - 33.0 pg Final    MCHC 11/11/2024 34.2  31.5 - 35.7 g/dL Final    RDW 11/11/2024 15.0  12.3 - 15.4 % Final    RDW-SD 11/11/2024 57.2 (H)  37.0 - 54.0 fl Final    MPV 11/11/2024 10.1  6.0 - 12.0 fL Final    Platelets 11/11/2024 353  140 - 450 10*3/mm3 Final    Neutrophil % 11/11/2024 56.7  42.7 - 76.0 % Final    Lymphocyte % 11/11/2024 29.2  19.6 - 45.3 % Final    Monocyte % 11/11/2024 9.5  5.0 - 12.0 % Final    Eosinophil % 11/11/2024 2.8  0.3 - 6.2 % Final    Basophil % 11/11/2024 1.6 (H)  0.0 - 1.5 % Final    Immature Grans % 11/11/2024 0.2  0.0 - 0.5 % Final    Neutrophils, Absolute 11/11/2024 3.66  1.70 - 7.00 10*3/mm3 Final    Lymphocytes, Absolute 11/11/2024 1.88  0.70 - 3.10 10*3/mm3 Final    Monocytes, Absolute 11/11/2024 0.61  0.10 - 0.90 10*3/mm3 Final    Eosinophils, Absolute 11/11/2024 0.18  0.00 - 0.40 10*3/mm3 Final    Basophils, Absolute 11/11/2024 0.10  0.00 - 0.20 10*3/mm3 Final    Immature Grans, Absolute 11/11/2024 0.01  0.00 - 0.05 10*3/mm3 Final    nRBC 11/11/2024 0.0  0.0 - 0.2 /100 WBC Final        CT Chest With Contrast Diagnostic    Result  Date: 11/11/2024  Narrative: CT ABDOMEN PELVIS W CONTRAST, CT CHEST W CONTRAST DIAGNOSTIC Date of Exam: 11/11/2024 9:18 AM EST Indication: restaging lymphoma. Comparison: CT chest dated 5/3/2024 and CT abdomen pelvis dated 11/6/2023 Technique: Axial CT images were obtained of the abdomen and pelvis following the uneventful intravenous administration of intravenous contrast. Reconstructed coronal and sagittal images were also obtained. Automated exposure control and iterative construction methods were used. Findings: CT chest: Hilum and Mediastinum: No pathologically enlarged lymph nodes.  Normal heart size.   No pericardial effusion.  Unremarkable thoracic aorta and pulmonary arteries. Stable 15 mm left lower pole thyroid nodule. Lung Parenchyma and Pleura: No focal consolidations.  No suspicious pulmonary nodules. No change in a 2 mm nodule left lung base. No endobronchial lesions.  No significant pleural effusions. Soft tissues: Unremarkable. Osseous structures: No aggressive focal lytic or sclerotic osseous lesions. CT abdomen/pelvis: Stable small hiatal hernia Liver:Liver is normal in size and CT density. No focal lesions. Multiple calcified granulomas Biliary/Gallbladder: The gallbladder is without evidence of radiopaque stones. The biliary tree is nondilated. Spleen:Spleen is normal in size and CT density. Multiple calcified granulomas Pancreas: Pancreas shows homogeneous density. There is no evidence of pancreatic mass or peripancreatic fluid. Kidneys: Kidneys are normal in size. There are no stones or hydronephrosis. Adrenals: Adrenal glands are unremarkable. Retroperitoneal/Lymph Nodes/Vasculature: No retroperitoneal adenopathy is identified by size criteria. Gastrointestinal/Mesentery: The bowel loops are non-dilated without definite wall thickening. Sigmoid and left colon diverticulosis with no diverticulitis. The appendix appears within normal limits. No evidence of obstruction. No free air. Bladder:  Mild diffuse bladder wall thickening. No acute abnormalities in the pelvis   Bony Structures:  Visualized bony structures are consistent with the patient's age. Stable sclerotic bone lesion left proximal femur     Impression: Impression: No evidence of metastatic disease in the chest, abdomen or pelvis Mild diffuse bladder wall thickening may represent cystitis. Please correlate clinically Few additional stable chronic ancillary findings Electronically Signed: Maxwell Mackay MD  11/11/2024 11:23 AM EST  Workstation ID: [id number]    CT Abdomen Pelvis With Contrast    Result Date: 11/11/2024  Narrative: CT ABDOMEN PELVIS W CONTRAST, CT CHEST W CONTRAST DIAGNOSTIC Date of Exam: 11/11/2024 9:18 AM EST Indication: restaging lymphoma. Comparison: CT chest dated 5/3/2024 and CT abdomen pelvis dated 11/6/2023 Technique: Axial CT images were obtained of the abdomen and pelvis following the uneventful intravenous administration of intravenous contrast. Reconstructed coronal and sagittal images were also obtained. Automated exposure control and iterative construction methods were used. Findings: CT chest: Hilum and Mediastinum: No pathologically enlarged lymph nodes.  Normal heart size.   No pericardial effusion.  Unremarkable thoracic aorta and pulmonary arteries. Stable 15 mm left lower pole thyroid nodule. Lung Parenchyma and Pleura: No focal consolidations.  No suspicious pulmonary nodules. No change in a 2 mm nodule left lung base. No endobronchial lesions.  No significant pleural effusions. Soft tissues: Unremarkable. Osseous structures: No aggressive focal lytic or sclerotic osseous lesions. CT abdomen/pelvis: Stable small hiatal hernia Liver:Liver is normal in size and CT density. No focal lesions. Multiple calcified granulomas Biliary/Gallbladder: The gallbladder is without evidence of radiopaque stones. The biliary tree is nondilated. Spleen:Spleen is normal in size and CT density. Multiple calcified granulomas  Pancreas: Pancreas shows homogeneous density. There is no evidence of pancreatic mass or peripancreatic fluid. Kidneys: Kidneys are normal in size. There are no stones or hydronephrosis. Adrenals: Adrenal glands are unremarkable. Retroperitoneal/Lymph Nodes/Vasculature: No retroperitoneal adenopathy is identified by size criteria. Gastrointestinal/Mesentery: The bowel loops are non-dilated without definite wall thickening. Sigmoid and left colon diverticulosis with no diverticulitis. The appendix appears within normal limits. No evidence of obstruction. No free air. Bladder: Mild diffuse bladder wall thickening. No acute abnormalities in the pelvis   Bony Structures:  Visualized bony structures are consistent with the patient's age. Stable sclerotic bone lesion left proximal femur     Impression: Impression: No evidence of metastatic disease in the chest, abdomen or pelvis Mild diffuse bladder wall thickening may represent cystitis. Please correlate clinically Few additional stable chronic ancillary findings Electronically Signed: Maxwell Mackay MD  11/11/2024 11:23 AM EST  Workstation ID: [id number]       ASSESSMENT: The patient is a very pleasant 74 y.o. female  with mandible diffuse large B-cell lymphoma.    PLAN:  1.  Diffuse large B-cell lymphoma:  A.  I did go over the scan and reassured the patient no evidence of relapsed lymphoma in the chest abdomen pelvis.  B.  I will switch her to annual scans at this point for 1 more year and then we will stop surveillance.    2.  Heartburn:  A.  She will continue omeprazole 20 mg daily for acid reflux.     3.  Osteoporosis:  A.  The patient will continue calcium with vitamin D as well as Fosamax weekly.    4.  Nonspecific 4 mm left lower lobe lung nodules:  A. This resolved on  her last CT scan. We will continue to follow this with follow up imaging for her lymphoma.     5.  Primary polycythemia, JAK2 positive:  A.  I will continue Hydrea 500 mg daily.  B.  I will  continue aspirin 81 mg daily.  C.  She will continue phlebotomy as needed for hematocrit more than 45.  Will change this to every other months at this point.  Last phlebotomy was done July 12, 2024.  D.  I reassured the patient her hemoglobin markers are normal today with hematocrit of 42.    FOLLOW UP: 4 months with repeat labs and possible phlebotomy.      Joan Zuluaga MD  11/19/2024

## 2024-11-20 ENCOUNTER — SPECIALTY PHARMACY (OUTPATIENT)
Dept: ONCOLOGY | Facility: HOSPITAL | Age: 74
End: 2024-11-20
Payer: MEDICARE

## 2024-12-13 ENCOUNTER — SPECIALTY PHARMACY (OUTPATIENT)
Dept: ONCOLOGY | Facility: HOSPITAL | Age: 74
End: 2024-12-13
Payer: MEDICARE

## 2024-12-13 NOTE — PROGRESS NOTES
Specialty Pharmacy Refill Coordination Note     Lanette is a 74 y.o. female contacted today regarding refills of  hydroxyurea 500mg PO QD specialty medication(s).    Reviewed and verified with patient: yes  Specialty medication(s) and dose(s) confirmed: yes    Refill Questions      Flowsheet Row Most Recent Value   Changes to allergies? No   Changes to medications? No   New conditions or infections since last clinic visit No   Unplanned office visit, urgent care, ED, or hospital admission in the last 4 weeks  No   How does patient/caregiver feel medication is working? Good   Financial problems or insurance changes  No   Since the previous refill, were any specialty medication doses or scheduled injections missed or delayed?  No   Does this patient require a clinical escalation to a pharmacist? No            Delivery Questions      Flowsheet Row Most Recent Value   Delivery method UPS   Delivery address verified with patient/caregiver? Yes   Delivery address Home   Number of medications in delivery 1   Medication(s) being filled and delivered Hydroxyurea (HYDREA)   Doses left of specialty medications 20 days left   Copay verified? Yes   Copay amount 0$   Copay form of payment No copayment ($0)   Ship Date 12/16   Delivery Date 12/17   Signature Required No                   Follow-up: 28 day(s)     Brian Borden, Pharmacy Technician  Specialty Pharmacy Technician

## 2025-01-14 ENCOUNTER — SPECIALTY PHARMACY (OUTPATIENT)
Dept: ONCOLOGY | Facility: HOSPITAL | Age: 75
End: 2025-01-14
Payer: MEDICARE

## 2025-01-14 NOTE — PROGRESS NOTES
Specialty Pharmacy Refill Coordination Note     Lanette is a 74 y.o. female contacted today regarding refills of  hydroxyurea 500mg PO QD specialty medication(s).    Reviewed and verified with patient: yes  Specialty medication(s) and dose(s) confirmed: yes    Refill Questions      Flowsheet Row Most Recent Value   Changes to allergies? No   Changes to medications? No   New conditions or infections since last clinic visit No   Unplanned office visit, urgent care, ED, or hospital admission in the last 4 weeks  No   How does patient/caregiver feel medication is working? Good   Financial problems or insurance changes  No   Since the previous refill, were any specialty medication doses or scheduled injections missed or delayed?  No   Does this patient require a clinical escalation to a pharmacist? No            Delivery Questions      Flowsheet Row Most Recent Value   Delivery method UPS   Delivery address verified with patient/caregiver? Yes   Delivery address Home   Number of medications in delivery 1   Medication(s) being filled and delivered Hydroxyurea (HYDREA)   Doses left of specialty medications 17 days left   Copay verified? Yes   Copay amount 4.76$ verified w/ patient   Copay form of payment Credit/debit on file   Ship Date 1/14   Delivery Date 1/15   Signature Required No                   Follow-up: 28 day(s)     Brian Borden, Pharmacy Technician  Specialty Pharmacy Technician

## 2025-01-15 ENCOUNTER — HOSPITAL ENCOUNTER (OUTPATIENT)
Dept: ONCOLOGY | Facility: HOSPITAL | Age: 75
Discharge: HOME | End: 2025-01-15
Admitting: INTERNAL MEDICINE
Payer: MEDICARE

## 2025-01-15 VITALS
SYSTOLIC BLOOD PRESSURE: 130 MMHG | WEIGHT: 176 LBS | DIASTOLIC BLOOD PRESSURE: 86 MMHG | HEART RATE: 86 BPM | RESPIRATION RATE: 18 BRPM | TEMPERATURE: 97.4 F | BODY MASS INDEX: 32.39 KG/M2 | HEIGHT: 62 IN

## 2025-01-15 DIAGNOSIS — C83.31 DIFFUSE LARGE B-CELL LYMPHOMA OF LYMPH NODES OF HEAD: ICD-10-CM

## 2025-01-15 DIAGNOSIS — D45 POLYCYTHEMIA VERA: ICD-10-CM

## 2025-01-15 LAB
HCT VFR BLD AUTO: 38 % (ref 34–46.6)
HGB BLD-MCNC: 13.2 G/DL (ref 12–15.9)

## 2025-01-15 PROCEDURE — 85018 HEMOGLOBIN: CPT | Performed by: INTERNAL MEDICINE

## 2025-01-15 PROCEDURE — 85014 HEMATOCRIT: CPT | Performed by: INTERNAL MEDICINE

## 2025-01-15 PROCEDURE — G0463 HOSPITAL OUTPT CLINIC VISIT: HCPCS

## 2025-01-15 PROCEDURE — 36415 COLL VENOUS BLD VENIPUNCTURE: CPT

## 2025-02-13 ENCOUNTER — SPECIALTY PHARMACY (OUTPATIENT)
Dept: ONCOLOGY | Facility: HOSPITAL | Age: 75
End: 2025-02-13
Payer: MEDICARE

## 2025-02-13 NOTE — PROGRESS NOTES
Specialty Pharmacy Patient Management Program  Refill Outreach     Lanette was contacted today regarding refills of their medication(s).    Refill Questions      Flowsheet Row Most Recent Value   Changes to allergies? No   Changes to medications? No   New conditions or infections since last clinic visit No   Unplanned office visit, urgent care, ED, or hospital admission in the last 4 weeks  No   How does patient/caregiver feel medication is working? Good   Financial problems or insurance changes  No   Since the previous refill, were any specialty medication doses or scheduled injections missed or delayed?  No   Does this patient require a clinical escalation to a pharmacist? No            Delivery Questions      Flowsheet Row Most Recent Value   Delivery method UPS   Delivery address verified with patient/caregiver? Yes   Delivery address Home   Number of medications in delivery 1   Medication(s) being filled and delivered Hydroxyurea (HYDREA)   Doses left of specialty medications 14 days left   Copay verified? Yes   Copay amount $4.76   Copay form of payment Credit/debit on file   Ship Date 2/13/25   Delivery Date Selection 02/14/25   Signature Required No                 Follow-up: 30 day(s)    Tangela Hernández, Pharmacy Care Coordinator   2/13/2025  11:23 EST

## 2025-03-14 ENCOUNTER — SPECIALTY PHARMACY (OUTPATIENT)
Dept: ONCOLOGY | Facility: HOSPITAL | Age: 75
End: 2025-03-14
Payer: MEDICARE

## 2025-03-14 NOTE — PROGRESS NOTES
Specialty Pharmacy Patient Management Program  Refill Outreach     Lanette was contacted today regarding refills of their medication(s).    Refill Questions      Flowsheet Row Most Recent Value   Changes to allergies? No   Changes to medications? No   New conditions or infections since last clinic visit No   Unplanned office visit, urgent care, ED, or hospital admission in the last 4 weeks  No   How does patient/caregiver feel medication is working? Good   Financial problems or insurance changes  No   Since the previous refill, were any specialty medication doses or scheduled injections missed or delayed?  No   Does this patient require a clinical escalation to a pharmacist? No            Delivery Questions      Flowsheet Row Most Recent Value   Delivery method UPS   Delivery address verified with patient/caregiver? Yes   Delivery address Home   Other address preferred n/a   Number of medications in delivery 1   Medication(s) being filled and delivered Hydroxyurea (HYDREA)   Doses left of specialty medications 13 days left   Copay verified? Yes   Copay amount 4.76$ verified w/ patient   Copay form of payment Credit/debit on file   Delivery Date Selection 03/18/25   Signature Required No                 Follow-up: 28 day(s)     Brian Borden, Pharmacy Technician  3/14/2025  10:01 EDT

## 2025-03-18 ENCOUNTER — APPOINTMENT (OUTPATIENT)
Dept: ONCOLOGY | Facility: HOSPITAL | Age: 75
End: 2025-03-18
Payer: MEDICARE

## 2025-03-18 ENCOUNTER — LAB (OUTPATIENT)
Dept: LAB | Facility: HOSPITAL | Age: 75
End: 2025-03-18
Payer: MEDICARE

## 2025-03-18 ENCOUNTER — SPECIALTY PHARMACY (OUTPATIENT)
Facility: HOSPITAL | Age: 75
End: 2025-03-18
Payer: MEDICARE

## 2025-03-18 ENCOUNTER — OFFICE VISIT (OUTPATIENT)
Dept: ONCOLOGY | Facility: CLINIC | Age: 75
End: 2025-03-18
Payer: MEDICARE

## 2025-03-18 VITALS
TEMPERATURE: 97.5 F | RESPIRATION RATE: 20 BRPM | SYSTOLIC BLOOD PRESSURE: 144 MMHG | WEIGHT: 178 LBS | OXYGEN SATURATION: 99 % | BODY MASS INDEX: 32.76 KG/M2 | HEIGHT: 62 IN | HEART RATE: 86 BPM | DIASTOLIC BLOOD PRESSURE: 66 MMHG

## 2025-03-18 DIAGNOSIS — D45 POLYCYTHEMIA VERA: ICD-10-CM

## 2025-03-18 DIAGNOSIS — C83.31 DIFFUSE LARGE B-CELL LYMPHOMA OF LYMPH NODES OF HEAD: ICD-10-CM

## 2025-03-18 DIAGNOSIS — C83.31 DIFFUSE LARGE B-CELL LYMPHOMA OF LYMPH NODES OF HEAD: Primary | ICD-10-CM

## 2025-03-18 LAB
BASOPHILS # BLD AUTO: 0.12 10*3/MM3 (ref 0–0.2)
BASOPHILS NFR BLD AUTO: 2 % (ref 0–1.5)
DEPRECATED RDW RBC AUTO: 52.7 FL (ref 37–54)
EOSINOPHIL # BLD AUTO: 0.16 10*3/MM3 (ref 0–0.4)
EOSINOPHIL NFR BLD AUTO: 2.6 % (ref 0.3–6.2)
ERYTHROCYTE [DISTWIDTH] IN BLOOD BY AUTOMATED COUNT: 13.9 % (ref 12.3–15.4)
HCT VFR BLD AUTO: 42.1 % (ref 34–46.6)
HGB BLD-MCNC: 14.6 G/DL (ref 12–15.9)
IMM GRANULOCYTES # BLD AUTO: 0.01 10*3/MM3 (ref 0–0.05)
IMM GRANULOCYTES NFR BLD AUTO: 0.2 % (ref 0–0.5)
LYMPHOCYTES # BLD AUTO: 1.72 10*3/MM3 (ref 0.7–3.1)
LYMPHOCYTES NFR BLD AUTO: 28.3 % (ref 19.6–45.3)
MCH RBC QN AUTO: 35.8 PG (ref 26.6–33)
MCHC RBC AUTO-ENTMCNC: 34.7 G/DL (ref 31.5–35.7)
MCV RBC AUTO: 103.2 FL (ref 79–97)
MONOCYTES # BLD AUTO: 0.58 10*3/MM3 (ref 0.1–0.9)
MONOCYTES NFR BLD AUTO: 9.5 % (ref 5–12)
NEUTROPHILS NFR BLD AUTO: 3.49 10*3/MM3 (ref 1.7–7)
NEUTROPHILS NFR BLD AUTO: 57.4 % (ref 42.7–76)
PLATELET # BLD AUTO: 323 10*3/MM3 (ref 140–450)
PMV BLD AUTO: 9.2 FL (ref 6–12)
RBC # BLD AUTO: 4.08 10*6/MM3 (ref 3.77–5.28)
WBC NRBC COR # BLD AUTO: 6.08 10*3/MM3 (ref 3.4–10.8)

## 2025-03-18 PROCEDURE — 99214 OFFICE O/P EST MOD 30 MIN: CPT | Performed by: NURSE PRACTITIONER

## 2025-03-18 PROCEDURE — 1160F RVW MEDS BY RX/DR IN RCRD: CPT | Performed by: NURSE PRACTITIONER

## 2025-03-18 PROCEDURE — 1126F AMNT PAIN NOTED NONE PRSNT: CPT | Performed by: NURSE PRACTITIONER

## 2025-03-18 PROCEDURE — 1159F MED LIST DOCD IN RCRD: CPT | Performed by: NURSE PRACTITIONER

## 2025-03-18 PROCEDURE — 85025 COMPLETE CBC W/AUTO DIFF WBC: CPT

## 2025-03-18 PROCEDURE — 36415 COLL VENOUS BLD VENIPUNCTURE: CPT

## 2025-03-18 NOTE — PROGRESS NOTES
DATE OF VISIT: 3/18/2025    REASON FOR VISIT: Followup for mandible diffuse large B-cell lymphoma     PROBLEM LIST:  1. Mandible diffuse large B-cell lymphoma, stage IIE:  A.  Presented with gum pain and swelling  B.  CT facial bones done on February 26, 2019 revealed 2.3 cm lucency in the anterior mandible  C.  Status post biopsy done on March 7, 2019 consistent with diffuse large B-cell lymphoma  D.  Bone marrow biopsy done on March 25, 2019 no evidence of lymphoma involvement.  E.  Whole body PET scan done on March 28, 2019 revealed hypermetabolic activity in the right curtis-mandible as well as jugular cervical chains hypermetabolic lymphadenopathy level 2 and level 3.  F. Started chemotherapy with R-CHOP April 2019,status post 6 cycles, completed July 22, 2019.  2.  Osteoarthritis  3.  Primary polycythemia vera, JAK2 mutation positive  4.  Heartburn    HISTORY OF PRESENT ILLNESS: The patient is a very pleasant 74 y.o. female with past medical history significant for mandible diffuse large B-cell lymphoma diagnosed March 2019.  Staging workup revealed stage IIE disease.  Patient was started on definitive chemotherapy with R CHOP April 8, 2019.  The patient completed cycle #6 June 22, 2019.  The patient is here today for scheduled follow-up visit.    SUBJECTIVE: The patient is here today by herself. She is doing fairly well. She has been compliant with use of Hydrea. She notes some mild nausea after taking her Hydrea at times, but no vomiting. She complains of some fatigue. She has not needed phlebotomy since last summer. She denies facial flushing or headaches.     Past History:  Medical History: has a past medical history of Age-related osteoporosis without current pathological fracture (03/07/2022), Arthritis, Colon polyp (12/05/2013), Colon, diverticulosis, Degenerative arthritis of spine, Diffuse large B cell lymphoma (2019), Heart murmur (7-28-22), Nonalcoholic hepatosteatosis, OAB (overactive bladder),  "Osteopenia, and Wears glasses.   Surgical History: has a past surgical history that includes Total abdominal hysterectomy; Bone marrow biopsy; Bone biopsy; Colonoscopy (2013); and Venous Access Device (Port) (N/A, 4/4/2019).   Family History: family history includes Breast cancer in her maternal grandmother and paternal grandmother; Diabetes in her brother, brother, father, and sister; Glaucoma in her paternal grandfather; Gout in her brother; Heart disease in her brother, father, and mother; Hyperlipidemia in her mother; Hypertension in her brother, brother, mother, and sister; Pancreatic cancer in her mother; Prostate cancer in her father; Stroke in her maternal grandfather.   Social History: reports that she has never smoked. She has never used smokeless tobacco. She reports that she does not drink alcohol and does not use drugs.    (Not in a hospital admission)     Allergies: Patient has no known allergies.    Review of Systems   Constitutional:  Positive for fatigue.   Gastrointestinal:  Positive for nausea.   Musculoskeletal:  Positive for arthralgias.       PHYSICAL EXAMINATION:   /66 Comment: LUE  Pulse 86   Temp 97.5 °F (36.4 °C) (Temporal)   Resp 20   Ht 157.5 cm (62\")   Wt 80.7 kg (178 lb)   SpO2 99% Comment: RA  BMI 32.56 kg/m²    Pain Score    03/18/25 1052   PainSc: 0-No pain            ECOG Performance Status: 0 - Asymptomatic  General Appearance:  alert, cooperative, no apparent distress and appears stated age   Lungs:   Clear to auscultation bilaterally; respirations regular, even, and unlabored bilaterally   Heart:  Regular rate and rhythm, no murmurs appreciated   Abdomen:   Soft, non-tender, non-distended, and no organomegaly     Lab on 03/18/2025   Component Date Value Ref Range Status    WBC 03/18/2025 6.08  3.40 - 10.80 10*3/mm3 Final    RBC 03/18/2025 4.08  3.77 - 5.28 10*6/mm3 Final    Hemoglobin 03/18/2025 14.6  12.0 - 15.9 g/dL Final    Hematocrit 03/18/2025 42.1  34.0 - 46.6 " % Final    MCV 03/18/2025 103.2 (H)  79.0 - 97.0 fL Final    MCH 03/18/2025 35.8 (H)  26.6 - 33.0 pg Final    MCHC 03/18/2025 34.7  31.5 - 35.7 g/dL Final    RDW 03/18/2025 13.9  12.3 - 15.4 % Final    RDW-SD 03/18/2025 52.7  37.0 - 54.0 fl Final    MPV 03/18/2025 9.2  6.0 - 12.0 fL Final    Platelets 03/18/2025 323  140 - 450 10*3/mm3 Final    Neutrophil % 03/18/2025 57.4  42.7 - 76.0 % Final    Lymphocyte % 03/18/2025 28.3  19.6 - 45.3 % Final    Monocyte % 03/18/2025 9.5  5.0 - 12.0 % Final    Eosinophil % 03/18/2025 2.6  0.3 - 6.2 % Final    Basophil % 03/18/2025 2.0 (H)  0.0 - 1.5 % Final    Immature Grans % 03/18/2025 0.2  0.0 - 0.5 % Final    Neutrophils, Absolute 03/18/2025 3.49  1.70 - 7.00 10*3/mm3 Final    Lymphocytes, Absolute 03/18/2025 1.72  0.70 - 3.10 10*3/mm3 Final    Monocytes, Absolute 03/18/2025 0.58  0.10 - 0.90 10*3/mm3 Final    Eosinophils, Absolute 03/18/2025 0.16  0.00 - 0.40 10*3/mm3 Final    Basophils, Absolute 03/18/2025 0.12  0.00 - 0.20 10*3/mm3 Final    Immature Grans, Absolute 03/18/2025 0.01  0.00 - 0.05 10*3/mm3 Final        No results found.      ASSESSMENT: The patient is a very pleasant 74 y.o. female  with mandible diffuse large B-cell lymphoma.    PLAN:  1.  Diffuse large B-cell lymphoma:  A. I reviewed the lab results from today with the patient. Her CBC revealed normal WBC, hemoglobin, and platelet count. She has mild macrocytosis likely induced by Hydrea.   B.  She will need annual follow up scans that will be due November 2025. This will complete her 5 years of routine surveillance. If her next scan is stable, we will repeat scans in the future only if need for new symptoms or clinical symptoms of relapsed disease.     2.  Heartburn:  A.  She will continue omeprazole 20 mg daily for acid reflux.     3.  Osteoporosis:  A.  The patient will continue calcium with vitamin D as well as Fosamax weekly.    4.  Nonspecific 4 mm left lower lobe lung nodules:  A. This resolved on   her last CT scan. We will continue to follow this with follow up imaging for her lymphoma.     5.  Primary polycythemia, JAK2 positive:  A.  I will continue Hydrea 500 mg daily.  B.  I will continue aspirin 81 mg daily.  C.  She will continue phlebotomy as needed for hematocrit more than 45.  She will continue this every other month.  Last phlebotomy was done July 12, 2024.  D. Her hematocrit today was 42.1.     FOLLOW UP: 4 months with repeat labs and possible phlebotomy.      Migdalia Blanca, APRN  3/18/2025

## 2025-04-17 ENCOUNTER — SPECIALTY PHARMACY (OUTPATIENT)
Dept: ONCOLOGY | Facility: HOSPITAL | Age: 75
End: 2025-04-17
Payer: MEDICARE

## 2025-04-17 NOTE — PROGRESS NOTES
Specialty Pharmacy Patient Management Program  Refill Outreach     Lanette was contacted today regarding refills of their medication(s).    Refill Questions      Flowsheet Row Most Recent Value   Changes to allergies? No   Changes to medications? Yes  [patient starting Nitrofurantoin 100MG Q12H x 7 days on 4/17/2025]   New conditions or infections since last clinic visit No   Unplanned office visit, urgent care, ED, or hospital admission in the last 4 weeks  No   How does patient/caregiver feel medication is working? Good   Financial problems or insurance changes  No   Since the previous refill, were any specialty medication doses or scheduled injections missed or delayed?  No   Does this patient require a clinical escalation to a pharmacist? Yes            Delivery Questions      Flowsheet Row Most Recent Value   Delivery method UPS   Delivery address verified with patient/caregiver? Yes   Delivery address Home   Other address preferred n/a   Number of medications in delivery 1   Medication(s) being filled and delivered Hydroxyurea (HYDREA)   Doses left of specialty medications 7 days left   Copay verified? Yes   Copay amount 4.76$ verified w/ patient   Copay form of payment Credit/debit on file   Delivery Date Selection 04/18/25   Signature Required No                 Follow-up: 28 day(s)     Brian Borden, Pharmacy Technician  4/17/2025  09:22 EDT

## 2025-04-17 NOTE — PROGRESS NOTES
No drug-drug interaction between hydroxyurea and nitrofurantoin.    Shani Grace, JarekD, [facility]  Clinical Oncology Pharmacy Specialist  Phone: [phone]

## 2025-05-01 ENCOUNTER — SPECIALTY PHARMACY (OUTPATIENT)
Facility: HOSPITAL | Age: 75
End: 2025-05-01
Payer: MEDICARE

## 2025-05-01 NOTE — PROGRESS NOTES
Specialty Pharmacy Patient Management Program  Oncology 6-Month Clinical Assessment       Lanette Simpson is a 74 y.o. female with polycythemia vera seen today to assess adherence and side effects.    Reason for Outreach: Routine medication check-in .    Regimen: Hydroxyurea 500mg PO daily + aspirin 81mg PO daily    Specialty Pharmacy Goal   Goals Addressed Today        Specialty Pharmacy General Goal      Clinical goal/therapeutic target: disease control, per the recent oncology clinic notes and labs. Target hematocrit less than 45%.    Latest Reference Range & Units 05/28/24 10:21 06/04/24 12:02 06/11/24 10:36 06/18/24 07/12/24 09:35 08/19/24 10:55   Hematocrit 34.0 - 46.6 % 48.4 (H) 47.1 (H) 48.5 (H) 48.6 (H) 46.6 43.2   (H): Data is abnormally high    11/5/24: I have reviewed the most recent clinic note and labs. Dr. Zuluaga recommends continuing hydroxyurea at this time. The patient is tolerating the medication and is currently on track for her goal.     Latest Reference Range & Units 11/11/24 10:22 11/19/24 10:32 01/15/25 12:46 03/18/25 10:47   Hematocrit 34.0 - 46.6 % 43.9 41.9 38.0 42.1     5/1/25: I have reviewed the most recent clinic note and labs. Dr. Zuluaga recommends continuing hydroxyurea at this time. The patient is tolerating the medication and is currently on track for goal.  Medication is preventing disease progression as evidenced by imaging or provider/clinic documentation.            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    GERD (gastroesophageal reflux disease) K21.9    Varicose vein of leg I83.90    Urinary frequency R35.0    Diffuse large B-cell lymphoma of lymph nodes of head C83.31    Overactive bladder N32.81    Primary insomnia F51.01    Age-related osteoporosis without current pathological fracture M81.0    Nonrheumatic aortic valve stenosis I35.0     Polycythemia vera D45       Medication Assessment for Specialty Medication(s):  Medication Assessment  Follow Up Clinical Assessment  Linked Medication(s) Assessed: Hydroxyurea (HYDREA)  Therapeutic appropriateness: Appropriate  Medication tolerability: Patient reported common adverse drug reaction  Common ADR(s) experienced: Patient has occasional nausea - Zofran is able to manage PRN; patient has chronic fatigue but able to do normal ADL  Medication plan: Continue therapy with normal follow-up  Quality of Life Improvement Scale: 10-Significantly better  Administration: Patient is taking every day at the same time .   Patient can self administer medications: yes  Medication Follow-Up Plan: Next clinical assessment and Refill coordination  Lab Review: The labs listed below have been reviewed. No dose adjustments are needed for the oral specialty medication(s) based on the labs.    Lab Results   Component Value Date    GLUCOSE 89 11/11/2024    CALCIUM 9.2 11/11/2024     11/11/2024    K 4.3 11/11/2024    CO2 24.0 11/11/2024     11/11/2024    BUN 14 11/11/2024    CREATININE 0.87 11/11/2024    EGFRIFAFRI 85 12/27/2021    EGFRIFNONA 71 12/27/2021    BCR 16.1 11/11/2024    ANIONGAP 12.0 11/11/2024     Lab Results   Component Value Date    WBC 6.08 03/18/2025    RBC 4.08 03/18/2025    HGB 14.6 03/18/2025    HCT 42.1 03/18/2025    .2 (H) 03/18/2025    MCH 35.8 (H) 03/18/2025    MCHC 34.7 03/18/2025    RDW 13.9 03/18/2025    RDWSD 52.7 03/18/2025    MPV 9.2 03/18/2025     03/18/2025    NEUTRORELPCT 57.4 03/18/2025    LYMPHORELPCT 28.3 03/18/2025    MONORELPCT 9.5 03/18/2025    EOSRELPCT 2.6 03/18/2025    BASORELPCT 2.0 (H) 03/18/2025    AUTOIGPER 0.2 03/18/2025    NEUTROABS 3.49 03/18/2025    LYMPHSABS 1.72 03/18/2025    MONOSABS 0.58 03/18/2025    EOSABS 0.16 03/18/2025    BASOSABS 0.12 03/18/2025    AUTOIGNUM 0.01 03/18/2025    NRBC 0.0 11/11/2024     Drug-drug interactions  Completed medication  reconciliation today to assess for drug interactions. Patient denies starting or stopping any medications.    Assessed medication list for interactions, no significant drug interactions noted.   Advised patient to call the clinic if any new medications are started so we can assess for drug-drug interactions.  Drug-food interactions discussed  Vaccines are coordinated by the patient's oncologist and primary care provider.    Medications   Medicines reviewed by Shani Grace PharmD on 5/1/2025 at 12:25 PM  Prior to Admission medications    Medication Sig Start Date End Date Taking? Authorizing Provider   alendronate (Fosamax) 70 MG tablet Take 1 tablet by mouth Every 7 (Seven) Days. 12/12/22   Janette Fontana MD   aspirin 81 MG EC tablet Take 1 tablet by mouth Daily. 12/13/23   Austin Zayas MD   Calcium Carb-Cholecalciferol (CALCIUM+D3 PO) Take 1 tablet by mouth Daily.    Austin Zayas MD   Calcium Citrate-Vitamin D 250-2.5 MG-MCG per tablet Take  by mouth.    Austin Zayas MD   furosemide (LASIX) 20 MG tablet  5/9/24   Austin Zayas MD   hydroxyurea (HYDREA) 500 MG capsule Take 1 capsule by mouth Daily. 10/23/24   Joan Zuluaga MD   Multiple Vitamins-Minerals (MULTIVITAMIN ADULT PO) Take 1 tablet by mouth Daily.    Austin Zayas MD   multivitamin with minerals (EYE VITAMINS & MINERALS PO)  6/12/23   Austin Zayas MD   omeprazole (priLOSEC) 20 MG capsule TAKE 1 CAPSULE BY MOUTH EVERY NIGHT AT BEDTIME 10/4/24   Saul Lara MD   ondansetron (ZOFRAN) 8 MG tablet Take 1 tablet by mouth 3 (Three) Times a Day As Needed for Nausea or Vomiting. 5/13/24   Migdalia Blanca APRN   traZODone (DESYREL) 50 MG tablet Take 1 tablet by mouth At Night As Needed for Sleep. 12/12/22   Janette Fontana MD       Allergies  Known allergies and reactions were discussed with the patient. The patient's chart has been reviewed for allergy information and updated as necessary.   No Known  Allergies      Allergies reviewed by Shani Grace PharmD on 5/1/2025 at 12:25 PM    Hospitalizations and Urgent Care Visits Since Last Assessment:  Unplanned hospitalizations or inpatient admissions: 0  ED Visits: 0  Urgent Office Visits: 0    Adherence Assessment:  Adherence Questions  Linked Medication(s) Assessed: Hydroxyurea (HYDREA)  On average, how many doses/injections does the patient miss per month?: 0  What are the identified reasons for non-adherence or missed doses? : no problems identified  What is the estimated medication adherence level?: %  Based on the patient/caregiver response and refill history, does this patient require an MTP to track adherence improvements?: no    Quality of Life Assessment:  Quality of Life Assessment  Quality of Life Improvement Scale: 10-Significantly better    Financial Assessment:  Medication availability/affordability: Patient has had no issues obtaining medication from pharmacy.    Attestation:  I attest the patient was actively involved in and has agreed to the above plan of care. If the prescribed therapy is at any point deemed not appropriate based on the current or future assessments, a consultation will be initiated with the patient's specialty care provider to determine the best course of action. The revised plan of therapy will be documented along with any required assessments and/or additional patient education provided.       All questions addressed and patient had no additional concerns. Patient has pharmacy contact information.    Shani Grace PharmD, [facility]  Clinical Oncology Pharmacy Specialist  Phone: [phone]      5/1/2025  12:26 EDT

## 2025-05-15 ENCOUNTER — SPECIALTY PHARMACY (OUTPATIENT)
Dept: ONCOLOGY | Facility: HOSPITAL | Age: 75
End: 2025-05-15
Payer: MEDICARE

## 2025-06-09 ENCOUNTER — SPECIALTY PHARMACY (OUTPATIENT)
Dept: ONCOLOGY | Facility: HOSPITAL | Age: 75
End: 2025-06-09
Payer: MEDICARE

## 2025-06-09 NOTE — PROGRESS NOTES
Specialty Pharmacy Patient Management Program  Refill Outreach     Lanette was contacted today regarding refills of their medication(s).    Refill Questions      Flowsheet Row Most Recent Value   Changes to allergies? No   Changes to medications? No   New conditions or infections since last clinic visit No   Unplanned office visit, urgent care, ED, or hospital admission in the last 4 weeks  No   How does patient/caregiver feel medication is working? Good   Financial problems or insurance changes  No   Since the previous refill, were any specialty medication doses or scheduled injections missed or delayed?  No   Does this patient require a clinical escalation to a pharmacist? No            Delivery Questions      Flowsheet Row Most Recent Value   Delivery method UPS   Delivery address verified with patient/caregiver? Yes   Delivery address Home   Other address preferred n/a   Number of medications in delivery 1   Medication(s) being filled and delivered Hydroxyurea (HYDREA)   Doses left of specialty medications few days   Copay verified? Yes   Copay amount $4.76   Copay form of payment Credit/debit on file   Delivery Date Selection 06/10/25   Signature Required No                 Follow-up: 28 day(s)     Michelle Fried, Pharmacy Care Coordinator   6/9/2025  10:19 EDT

## 2025-07-02 ENCOUNTER — SPECIALTY PHARMACY (OUTPATIENT)
Dept: ONCOLOGY | Facility: HOSPITAL | Age: 75
End: 2025-07-02
Payer: MEDICARE

## 2025-07-02 NOTE — PROGRESS NOTES
Specialty Pharmacy Patient Management Program  Refill Outreach     Lanette was contacted today regarding refills of their medication(s).    Refill Questions      Flowsheet Row Most Recent Value   Changes to allergies? No   Changes to medications? No   New conditions or infections since last clinic visit No   Unplanned office visit, urgent care, ED, or hospital admission in the last 4 weeks  No   How does patient/caregiver feel medication is working? Good   Financial problems or insurance changes  No   Since the previous refill, were any specialty medication doses or scheduled injections missed or delayed?  No   Does this patient require a clinical escalation to a pharmacist? No            Delivery Questions      Flowsheet Row Most Recent Value   Delivery method UPS   Delivery address verified with patient/caregiver? Yes   Delivery address Home   Other address preferred n/a   Medication(s) being filled and delivered Hydroxyurea (HYDREA)   Doses left of specialty medications 1 week or so   Copay verified? Yes   Copay amount $4.76   Copay form of payment Credit/debit on file   Delivery Date Selection 07/03/25   Signature Required No                 Follow-up: 28 day(s)     Michelle Fried, Pharmacy Care Coordinator   7/2/2025  09:54 EDT

## 2025-07-10 DIAGNOSIS — C83.31 DIFFUSE LARGE B-CELL LYMPHOMA OF LYMPH NODES OF HEAD: ICD-10-CM

## 2025-07-10 DIAGNOSIS — D45 POLYCYTHEMIA VERA: Primary | ICD-10-CM

## 2025-07-10 RX ORDER — SODIUM CHLORIDE 9 MG/ML
250 INJECTION, SOLUTION INTRAVENOUS ONCE
OUTPATIENT
Start: 2025-07-10

## 2025-07-15 ENCOUNTER — OFFICE VISIT (OUTPATIENT)
Dept: ONCOLOGY | Facility: CLINIC | Age: 75
End: 2025-07-15
Payer: MEDICARE

## 2025-07-15 ENCOUNTER — LAB (OUTPATIENT)
Dept: LAB | Facility: HOSPITAL | Age: 75
End: 2025-07-15
Payer: MEDICARE

## 2025-07-15 ENCOUNTER — APPOINTMENT (OUTPATIENT)
Dept: ONCOLOGY | Facility: HOSPITAL | Age: 75
End: 2025-07-15
Payer: MEDICARE

## 2025-07-15 ENCOUNTER — SPECIALTY PHARMACY (OUTPATIENT)
Facility: HOSPITAL | Age: 75
End: 2025-07-15
Payer: MEDICARE

## 2025-07-15 VITALS
RESPIRATION RATE: 20 BRPM | HEART RATE: 95 BPM | HEIGHT: 62 IN | SYSTOLIC BLOOD PRESSURE: 125 MMHG | DIASTOLIC BLOOD PRESSURE: 79 MMHG | BODY MASS INDEX: 32.76 KG/M2 | OXYGEN SATURATION: 96 % | WEIGHT: 178 LBS | TEMPERATURE: 97.3 F

## 2025-07-15 DIAGNOSIS — D45 POLYCYTHEMIA VERA: ICD-10-CM

## 2025-07-15 DIAGNOSIS — C83.31 DIFFUSE LARGE B-CELL LYMPHOMA OF LYMPH NODES OF HEAD: ICD-10-CM

## 2025-07-15 DIAGNOSIS — C83.31 DIFFUSE LARGE B-CELL LYMPHOMA OF LYMPH NODES OF HEAD: Primary | ICD-10-CM

## 2025-07-15 LAB
BASOPHILS # BLD AUTO: 0.06 10*3/MM3 (ref 0–0.2)
BASOPHILS NFR BLD AUTO: 0.9 % (ref 0–1.5)
DEPRECATED RDW RBC AUTO: 52.7 FL (ref 37–54)
EOSINOPHIL # BLD AUTO: 0.2 10*3/MM3 (ref 0–0.4)
EOSINOPHIL NFR BLD AUTO: 2.9 % (ref 0.3–6.2)
ERYTHROCYTE [DISTWIDTH] IN BLOOD BY AUTOMATED COUNT: 13.9 % (ref 12.3–15.4)
HCT VFR BLD AUTO: 40.6 % (ref 34–46.6)
HGB BLD-MCNC: 13.9 G/DL (ref 12–15.9)
IMM GRANULOCYTES # BLD AUTO: 0.01 10*3/MM3 (ref 0–0.05)
IMM GRANULOCYTES NFR BLD AUTO: 0.1 % (ref 0–0.5)
LYMPHOCYTES # BLD AUTO: 1.9 10*3/MM3 (ref 0.7–3.1)
LYMPHOCYTES NFR BLD AUTO: 27.5 % (ref 19.6–45.3)
MCH RBC QN AUTO: 35 PG (ref 26.6–33)
MCHC RBC AUTO-ENTMCNC: 34.2 G/DL (ref 31.5–35.7)
MCV RBC AUTO: 102.3 FL (ref 79–97)
MONOCYTES # BLD AUTO: 0.58 10*3/MM3 (ref 0.1–0.9)
MONOCYTES NFR BLD AUTO: 8.4 % (ref 5–12)
NEUTROPHILS NFR BLD AUTO: 4.15 10*3/MM3 (ref 1.7–7)
NEUTROPHILS NFR BLD AUTO: 60.2 % (ref 42.7–76)
PLATELET # BLD AUTO: 317 10*3/MM3 (ref 140–450)
PMV BLD AUTO: 9.3 FL (ref 6–12)
RBC # BLD AUTO: 3.97 10*6/MM3 (ref 3.77–5.28)
WBC NRBC COR # BLD AUTO: 6.9 10*3/MM3 (ref 3.4–10.8)

## 2025-07-15 PROCEDURE — 36415 COLL VENOUS BLD VENIPUNCTURE: CPT

## 2025-07-15 PROCEDURE — 85025 COMPLETE CBC W/AUTO DIFF WBC: CPT

## 2025-07-15 PROCEDURE — 1126F AMNT PAIN NOTED NONE PRSNT: CPT | Performed by: INTERNAL MEDICINE

## 2025-07-15 PROCEDURE — 99214 OFFICE O/P EST MOD 30 MIN: CPT | Performed by: INTERNAL MEDICINE

## 2025-07-15 NOTE — PROGRESS NOTES
DATE OF VISIT: 7/15/2025    REASON FOR VISIT: Followup for mandible diffuse large B-cell lymphoma     PROBLEM LIST:  1. Mandible diffuse large B-cell lymphoma, stage IIE:  A.  Presented with gum pain and swelling  B.  CT facial bones done on February 26, 2019 revealed 2.3 cm lucency in the anterior mandible  C.  Status post biopsy done on March 7, 2019 consistent with diffuse large B-cell lymphoma  D.  Bone marrow biopsy done on March 25, 2019 no evidence of lymphoma involvement.  E.  Whole body PET scan done on March 28, 2019 revealed hypermetabolic activity in the right curtis-mandible as well as jugular cervical chains hypermetabolic lymphadenopathy level 2 and level 3.  F. Started chemotherapy with R-CHOP April 2019,status post 6 cycles, completed July 22, 2019.  2.  Osteoarthritis  3.  Primary polycythemia vera, JAK2 mutation positive  4.  Heartburn    HISTORY OF PRESENT ILLNESS: The patient is a very pleasant 75 y.o. female with past medical history significant for mandible diffuse large B-cell lymphoma diagnosed March 2019.  Staging workup revealed stage IIE disease.  Patient was started on definitive chemotherapy with R CHOP April 8, 2019.  The patient completed cycle #6 June 22, 2019.  The patient is here today for scheduled follow-up visit.    SUBJECTIVE: Alfred is here today by herself.  Denies any fever chills or night sweats.  She has been compliant with her Hydrea.    Past History:  Medical History: has a past medical history of Age-related osteoporosis without current pathological fracture (03/07/2022), Arthritis, Colon polyp (12/05/2013), Colon, diverticulosis, Degenerative arthritis of spine, Diffuse large B cell lymphoma (2019), Heart murmur (7-28-22), Nonalcoholic hepatosteatosis, OAB (overactive bladder), Osteopenia, and Wears glasses.   Surgical History: has a past surgical history that includes Total abdominal hysterectomy; Bone marrow biopsy; Bone biopsy; Colonoscopy (2013); and Venous Access  "Device (Port) (N/A, 4/4/2019).   Family History: family history includes Breast cancer in her maternal grandmother and paternal grandmother; Diabetes in her brother, brother, father, and sister; Glaucoma in her paternal grandfather; Gout in her brother; Heart disease in her brother, father, and mother; Hyperlipidemia in her mother; Hypertension in her brother, brother, mother, and sister; Pancreatic cancer in her mother; Prostate cancer in her father; Stroke in her maternal grandfather.   Social History: reports that she has never smoked. She has never used smokeless tobacco. She reports that she does not drink alcohol and does not use drugs.    (Not in a hospital admission)     Allergies: Allegra [fexofenadine]    Review of Systems   Constitutional:  Positive for fatigue.   Respiratory: Negative.     Musculoskeletal:  Positive for arthralgias.       PHYSICAL EXAMINATION:   /79 Comment: LUE  Pulse 95   Temp 97.3 °F (36.3 °C) (Infrared)   Resp 20   Ht 157.5 cm (62\")   Wt 80.7 kg (178 lb)   SpO2 96% Comment: RA  BMI 32.56 kg/m²    Pain Score    07/15/25 1253   PainSc: 0-No pain            ECOG Performance Status: 1 - Symptomatic but completely ambulatory  General Appearance:  alert, cooperative, no apparent distress and appears stated age   Lungs:   Clear to auscultation bilaterally; respirations regular, even, and unlabored bilaterally   Heart:  Regular rate and rhythm, no murmurs appreciated   Abdomen:   Soft, non-tender, non-distended, and no organomegaly     Lab on 07/15/2025   Component Date Value Ref Range Status    WBC 07/15/2025 6.90  3.40 - 10.80 10*3/mm3 Final    RBC 07/15/2025 3.97  3.77 - 5.28 10*6/mm3 Final    Hemoglobin 07/15/2025 13.9  12.0 - 15.9 g/dL Final    Hematocrit 07/15/2025 40.6  34.0 - 46.6 % Final    MCV 07/15/2025 102.3 (H)  79.0 - 97.0 fL Final    MCH 07/15/2025 35.0 (H)  26.6 - 33.0 pg Final    MCHC 07/15/2025 34.2  31.5 - 35.7 g/dL Final    RDW 07/15/2025 13.9  12.3 - 15.4 % " Final    RDW-SD 07/15/2025 52.7  37.0 - 54.0 fl Final    MPV 07/15/2025 9.3  6.0 - 12.0 fL Final    Platelets 07/15/2025 317  140 - 450 10*3/mm3 Final    Neutrophil % 07/15/2025 60.2  42.7 - 76.0 % Final    Lymphocyte % 07/15/2025 27.5  19.6 - 45.3 % Final    Monocyte % 07/15/2025 8.4  5.0 - 12.0 % Final    Eosinophil % 07/15/2025 2.9  0.3 - 6.2 % Final    Basophil % 07/15/2025 0.9  0.0 - 1.5 % Final    Immature Grans % 07/15/2025 0.1  0.0 - 0.5 % Final    Neutrophils, Absolute 07/15/2025 4.15  1.70 - 7.00 10*3/mm3 Final    Lymphocytes, Absolute 07/15/2025 1.90  0.70 - 3.10 10*3/mm3 Final    Monocytes, Absolute 07/15/2025 0.58  0.10 - 0.90 10*3/mm3 Final    Eosinophils, Absolute 07/15/2025 0.20  0.00 - 0.40 10*3/mm3 Final    Basophils, Absolute 07/15/2025 0.06  0.00 - 0.20 10*3/mm3 Final    Immature Grans, Absolute 07/15/2025 0.01  0.00 - 0.05 10*3/mm3 Final        No results found.      ASSESSMENT: The patient is a very pleasant 75 y.o. female  with mandible diffuse large B-cell lymphoma.    PLAN:  1.  Diffuse large B-cell lymphoma:  A.  Discussed with the patient about the results from today CBC reveals mild macrocytosis no other abnormalities.  B.  Next scan will be due November 2025.  At that point she will complete her 5-year routine surveillance and I will order the scans at that appointment as needed basis only or if she has associated symptoms or lab abnormalities.    2.  Heartburn:  A.  She will continue omeprazole 20 mg daily for acid reflux.     3.  Osteoporosis:  A.  The patient will continue calcium with vitamin D as well as Fosamax weekly.    4.  Macrocytosis:  Plan.  Despite Hydrea.  B.  Discussed with the patient MCV today 102.    5.  Primary polycythemia, JAK2 positive:  A.  I will continue Hydrea 500 mg daily.  B.  I will continue aspirin 81 mg daily.  C.  Discussed with the patient CBC from today revealed hematocrit 41 with normal white cells and platelets.  D.  We will continue phlebotomy as  needed for hematocrit more than 45.    FOLLOW UP: 4 months with repeat labs and possible phlebotomy and CT scan prior.      Joan Zuluaga MD  7/15/2025

## 2025-07-30 ENCOUNTER — SPECIALTY PHARMACY (OUTPATIENT)
Dept: ONCOLOGY | Facility: HOSPITAL | Age: 75
End: 2025-07-30
Payer: MEDICARE

## 2025-07-30 NOTE — PROGRESS NOTES
Specialty Pharmacy Patient Management Program  Refill Outreach     Lanette was contacted today regarding refills of their medication(s).    Refill Questions      Flowsheet Row Most Recent Value   Changes to allergies? No   Changes to medications? No   New conditions or infections since last clinic visit No   Unplanned office visit, urgent care, ED, or hospital admission in the last 4 weeks  No   How does patient/caregiver feel medication is working? Good   Financial problems or insurance changes  No   Since the previous refill, were any specialty medication doses or scheduled injections missed or delayed?  No   Does this patient require a clinical escalation to a pharmacist? No            Delivery Questions      Flowsheet Row Most Recent Value   Delivery method [employer]   Delivery address verified with patient/caregiver? Yes   Delivery address Home   Other address preferred n/a   Number of medications in delivery 1   Medication(s) being filled and delivered Hydroxyurea (HYDREA)   Doses left of specialty medications 1 week remaining   Copay verified? Yes   Copay amount $4.76   Copay form of payment No copayment ($0)   Delivery Date Selection 07/31/25   Signature Required No                 Follow-up: 28 day(s)     Tangela Hernández, Pharmacy Care Coordinator  7/30/2025  12:22 EDT

## (undated) DEVICE — PK MINOR SPLT 10

## (undated) DEVICE — SYR LL 3CC

## (undated) DEVICE — SNAP KOVER: Brand: UNBRANDED

## (undated) DEVICE — 3M™ IOBAN™ 2 ANTIMICROBIAL INCISE DRAPE 6650EZ: Brand: IOBAN™ 2

## (undated) DEVICE — ANTIBACTERIAL UNDYED BRAIDED (POLYGLACTIN 910), SYNTHETIC ABSORBABLE SUTURE: Brand: COATED VICRYL

## (undated) DEVICE — SKIN AFFIX SURG ADHESIVE 72/CS 0.55ML: Brand: MEDLINE

## (undated) DEVICE — GLV SURG BIOGEL LTX PF 7 1/2

## (undated) DEVICE — SUT PROLN 2/0 V7 36IN 8977H

## (undated) DEVICE — ENCORE® LATEX MICRO SIZE 8, STERILE LATEX POWDER-FREE SURGICAL GLOVE: Brand: ENCORE

## (undated) DEVICE — DECANT BG O JET

## (undated) DEVICE — SYR CONTRL LUERLOK 10CC

## (undated) DEVICE — SUT MNCRYL PLS ANTIB UD 4/0 PS2 18IN

## (undated) DEVICE — COVER,LIGHT HANDLE,FLX,1/PK: Brand: MEDLINE INDUSTRIES, INC.

## (undated) DEVICE — SYR LUERLOK 30CC

## (undated) DEVICE — APPL CHLORAPREP W/TINT 26ML BLU